# Patient Record
Sex: MALE | Race: WHITE | NOT HISPANIC OR LATINO | Employment: OTHER | URBAN - METROPOLITAN AREA
[De-identification: names, ages, dates, MRNs, and addresses within clinical notes are randomized per-mention and may not be internally consistent; named-entity substitution may affect disease eponyms.]

---

## 2017-07-27 ENCOUNTER — ALLSCRIPTS OFFICE VISIT (OUTPATIENT)
Dept: OTHER | Facility: OTHER | Age: 72
End: 2017-07-27

## 2017-07-27 LAB
BILIRUB UR QL STRIP: NEGATIVE
CLARITY UR: NORMAL
COLOR UR: YELLOW
GLUCOSE (HISTORICAL): NEGATIVE
HGB UR QL STRIP.AUTO: NEGATIVE
KETONES UR STRIP-MCNC: NEGATIVE MG/DL
LEUKOCYTE ESTERASE UR QL STRIP: NEGATIVE
NITRITE UR QL STRIP: NEGATIVE
PH UR STRIP.AUTO: 5.5 [PH]
PROT UR STRIP-MCNC: NEGATIVE MG/DL
SP GR UR STRIP.AUTO: 1.02
UROBILINOGEN UR QL STRIP.AUTO: 0.2

## 2017-09-11 ENCOUNTER — APPOINTMENT (OUTPATIENT)
Dept: PHYSICAL THERAPY | Facility: REHABILITATION | Age: 72
End: 2017-09-11
Payer: MEDICARE

## 2017-09-11 PROCEDURE — G8991 OTHER PT/OT GOAL STATUS: HCPCS

## 2017-09-11 PROCEDURE — G8990 OTHER PT/OT CURRENT STATUS: HCPCS

## 2017-09-11 PROCEDURE — 97162 PT EVAL MOD COMPLEX 30 MIN: CPT

## 2017-09-13 ENCOUNTER — APPOINTMENT (OUTPATIENT)
Dept: PHYSICAL THERAPY | Facility: REHABILITATION | Age: 72
End: 2017-09-13
Payer: MEDICARE

## 2017-09-13 PROCEDURE — 97110 THERAPEUTIC EXERCISES: CPT

## 2017-09-18 ENCOUNTER — APPOINTMENT (OUTPATIENT)
Dept: PHYSICAL THERAPY | Facility: REHABILITATION | Age: 72
End: 2017-09-18
Payer: MEDICARE

## 2017-09-20 ENCOUNTER — APPOINTMENT (OUTPATIENT)
Dept: PHYSICAL THERAPY | Facility: REHABILITATION | Age: 72
End: 2017-09-20
Payer: MEDICARE

## 2017-09-21 ENCOUNTER — HOSPITAL ENCOUNTER (EMERGENCY)
Facility: HOSPITAL | Age: 72
Discharge: HOME | End: 2017-09-21
Payer: MEDICARE

## 2017-09-21 ENCOUNTER — GENERIC CONVERSION - ENCOUNTER (OUTPATIENT)
Dept: OTHER | Facility: OTHER | Age: 72
End: 2017-09-21

## 2017-09-21 ENCOUNTER — APPOINTMENT (EMERGENCY)
Dept: CT IMAGING | Facility: HOSPITAL | Age: 72
End: 2017-09-21
Payer: MEDICARE

## 2017-09-21 VITALS
SYSTOLIC BLOOD PRESSURE: 125 MMHG | HEART RATE: 80 BPM | WEIGHT: 232 LBS | TEMPERATURE: 97.3 F | OXYGEN SATURATION: 97 % | DIASTOLIC BLOOD PRESSURE: 85 MMHG | RESPIRATION RATE: 18 BRPM

## 2017-09-21 DIAGNOSIS — M54.50 LOW BACK PAIN: Primary | ICD-10-CM

## 2017-09-21 PROCEDURE — 99283 EMERGENCY DEPT VISIT LOW MDM: CPT

## 2017-09-21 PROCEDURE — 74176 CT ABD & PELVIS W/O CONTRAST: CPT

## 2017-09-21 RX ORDER — GLIMEPIRIDE 2 MG/1
4 TABLET ORAL
COMMUNITY

## 2017-09-21 RX ORDER — LIDOCAINE 50 MG/G
1 PATCH TOPICAL DAILY
Qty: 30 PATCH | Refills: 0 | Status: SHIPPED | OUTPATIENT
Start: 2017-09-21 | End: 2018-08-28

## 2017-09-21 RX ORDER — ZOLPIDEM TARTRATE 10 MG/1
TABLET ORAL
COMMUNITY

## 2017-09-21 RX ORDER — ESCITALOPRAM OXALATE 20 MG/1
5 TABLET ORAL DAILY
COMMUNITY
End: 2018-08-28

## 2017-09-21 RX ORDER — CLONAZEPAM 0.5 MG/1
0.5 TABLET ORAL 4 TIMES DAILY
COMMUNITY

## 2017-09-21 RX ORDER — BICALUTAMIDE 50 MG/1
TABLET, FILM COATED ORAL DAILY
COMMUNITY
End: 2018-01-29 | Stop reason: ALTCHOICE

## 2017-09-21 RX ORDER — AZELASTINE 1 MG/ML
2 SPRAY, METERED NASAL 3 TIMES DAILY
COMMUNITY

## 2017-09-25 ENCOUNTER — APPOINTMENT (OUTPATIENT)
Dept: PHYSICAL THERAPY | Facility: REHABILITATION | Age: 72
End: 2017-09-25
Payer: MEDICARE

## 2017-09-25 PROCEDURE — 97140 MANUAL THERAPY 1/> REGIONS: CPT

## 2017-09-25 PROCEDURE — 97110 THERAPEUTIC EXERCISES: CPT

## 2017-09-27 ENCOUNTER — APPOINTMENT (OUTPATIENT)
Dept: PHYSICAL THERAPY | Facility: REHABILITATION | Age: 72
End: 2017-09-27
Payer: MEDICARE

## 2017-09-27 PROCEDURE — 97110 THERAPEUTIC EXERCISES: CPT

## 2017-09-27 PROCEDURE — 97140 MANUAL THERAPY 1/> REGIONS: CPT

## 2017-10-02 ENCOUNTER — APPOINTMENT (OUTPATIENT)
Dept: PHYSICAL THERAPY | Facility: REHABILITATION | Age: 72
End: 2017-10-02
Payer: MEDICARE

## 2017-10-02 PROCEDURE — 97140 MANUAL THERAPY 1/> REGIONS: CPT

## 2017-10-02 PROCEDURE — 97110 THERAPEUTIC EXERCISES: CPT

## 2017-10-04 ENCOUNTER — APPOINTMENT (OUTPATIENT)
Dept: PHYSICAL THERAPY | Facility: REHABILITATION | Age: 72
End: 2017-10-04
Payer: MEDICARE

## 2017-10-04 PROCEDURE — 97140 MANUAL THERAPY 1/> REGIONS: CPT

## 2017-10-04 PROCEDURE — 97110 THERAPEUTIC EXERCISES: CPT

## 2017-10-09 ENCOUNTER — APPOINTMENT (OUTPATIENT)
Dept: PHYSICAL THERAPY | Facility: REHABILITATION | Age: 72
End: 2017-10-09
Payer: MEDICARE

## 2017-10-11 ENCOUNTER — APPOINTMENT (OUTPATIENT)
Dept: PHYSICAL THERAPY | Facility: REHABILITATION | Age: 72
End: 2017-10-11
Payer: MEDICARE

## 2017-10-16 ENCOUNTER — APPOINTMENT (OUTPATIENT)
Dept: PHYSICAL THERAPY | Facility: REHABILITATION | Age: 72
End: 2017-10-16
Payer: MEDICARE

## 2017-10-18 ENCOUNTER — APPOINTMENT (OUTPATIENT)
Dept: PHYSICAL THERAPY | Facility: REHABILITATION | Age: 72
End: 2017-10-18
Payer: MEDICARE

## 2017-10-23 ENCOUNTER — APPOINTMENT (OUTPATIENT)
Dept: PHYSICAL THERAPY | Facility: REHABILITATION | Age: 72
End: 2017-10-23
Payer: MEDICARE

## 2017-10-24 ENCOUNTER — ALLSCRIPTS OFFICE VISIT (OUTPATIENT)
Dept: OTHER | Facility: OTHER | Age: 72
End: 2017-10-24

## 2017-10-24 DIAGNOSIS — C61 MALIGNANT NEOPLASM OF PROSTATE (HCC): ICD-10-CM

## 2017-10-24 LAB
BILIRUB UR QL STRIP: NEGATIVE
CLARITY UR: NORMAL
COLOR UR: YELLOW
GLUCOSE (HISTORICAL): NORMAL
HGB UR QL STRIP.AUTO: NORMAL
KETONES UR STRIP-MCNC: NEGATIVE MG/DL
LEUKOCYTE ESTERASE UR QL STRIP: NEGATIVE
NITRITE UR QL STRIP: NEGATIVE
PH UR STRIP.AUTO: 5.5 [PH]
PROT UR STRIP-MCNC: NEGATIVE MG/DL
SP GR UR STRIP.AUTO: 1.02
UROBILINOGEN UR QL STRIP.AUTO: 0.2

## 2017-10-25 ENCOUNTER — APPOINTMENT (OUTPATIENT)
Dept: PHYSICAL THERAPY | Facility: REHABILITATION | Age: 72
End: 2017-10-25
Payer: MEDICARE

## 2017-10-30 ENCOUNTER — GENERIC CONVERSION - ENCOUNTER (OUTPATIENT)
Dept: OTHER | Facility: OTHER | Age: 72
End: 2017-10-30

## 2017-10-30 ENCOUNTER — APPOINTMENT (OUTPATIENT)
Dept: PHYSICAL THERAPY | Facility: REHABILITATION | Age: 72
End: 2017-10-30
Payer: MEDICARE

## 2017-11-27 ENCOUNTER — GENERIC CONVERSION - ENCOUNTER (OUTPATIENT)
Dept: OTHER | Facility: OTHER | Age: 72
End: 2017-11-27

## 2018-01-08 DIAGNOSIS — R35.1 NOCTURIA: ICD-10-CM

## 2018-01-09 NOTE — MISCELLANEOUS
Message  PT  WAS COMPLAINING OF FOOT PAIN, BACK PAIN, SHOULDER PAIN  HE TOOK PAIN MEDICATION AND IT DIDN'T SEEM TO HELP  I RECOMMENDED HIM TO GO TO THE ED, AND FU AFTER  Active Problems    1  Acute prostatitis (601 0) (N41 0)   2  Adenocarcinoma of prostate (185) (C61)   3  Hematuria, gross (599 71) (R31 0)    Current Meds   1  Advil 200 MG Oral Capsule; Therapy: (Recorded:65Kjo5231) to Recorded   2  Allopurinol 100 MG Oral Tablet; Therapy: (Recorded:46Sjg0757) to Recorded   3  Amaryl 2 MG Oral Tablet (Glimepiride); Therapy: (Recorded:61Thr4299) to Recorded   4  Ambien 10 MG Oral Tablet (Zolpidem Tartrate); Therapy: (Recorded:71Tka5633) to Recorded   5  Amitriptyline HCl - 10 MG Oral Tablet; Therapy: (KSGBFJI:31JOY5931) to Recorded   6  Astelin 137 MCG/SPRAY SOLN (Azelastine HCl); Therapy: (Recorded:21Ynm3427) to Recorded   7  Casodex 50 MG Oral Tablet (Bicalutamide); Therapy: (Recorded:67Wzz1315) to Recorded   8  Doxycycline Hyclate 100 MG Oral Capsule; Take 1 capsule twice daily; Therapy: 25ETK4322 to (Yesenia Nevarez)  Requested for: 03YGA5872; Last   Rx:27Oct2017; Status: [address] berenice Good Verification Ordered   9  Escitalopram Oxalate 20 MG Oral Tablet; Therapy: (Recorded:66Sms4154) to Recorded   10  KlonoPIN 0 5 MG Oral Tablet (ClonazePAM); Therapy: (Recorded:97Tuv0853) to Recorded   11  MetFORMIN HCl - 500 MG Oral Tablet; Therapy: (Recorded:00Klw6231) to Recorded   12  Mucinex 600 MG Oral Tablet Extended Release 12 Hour; Therapy: (Recorded:33Zpw4911) to Recorded   13  Norco 5-325 MG Oral Tablet (Hydrocodone-Acetaminophen); Therapy: (Recorded:25Oxk5050) to Recorded   14  Omeprazole 20 MG Oral Capsule Delayed Release; Therapy: (Recorded:82Wll5768) to Recorded   15  Simvastatin 20 MG Oral Tablet; Therapy: (Recorded:62Kcm4234) to Recorded   16  TraMADol HCl - 50 MG Oral Tablet;     Therapy: (Recorded:05Csd5749) to Recorded   17  Valium 10 MG Oral Tablet (DiazePAM); Therapy: (Recorded:50Ztc8702) to Recorded   18  Vicodin 5-300 MG Oral Tablet; Therapy: (Recorded:09Anp0885) to Recorded   19  Victoza 18 MG/3ML Subcutaneous Solution Pen-injector; Therapy: (Recorded:68Hkj9072) to Recorded   20  Vitamin D-3 1000 UNIT Oral Capsule; Therapy: (Recorded:24Mtt8783) to Recorded   21  Voltaren 1 % Transdermal Gel (Diclofenac Sodium); Therapy: (Recorded:27Kai7035) to Recorded   22  Zocor 20 MG Oral Tablet (Simvastatin); Therapy: (Recorded:30Qsj9223) to Recorded    Allergies    1  Ceftin   2  Cephalosporins   3  Cipro   4  Fexofenadine HCl TABS   5  Levaquin TABS   6   Sulfa Drugs    Signatures   Electronically signed by : Christopher Swenson, ; Nov 27 2017 12:54PM EST                       (Author)

## 2018-01-11 NOTE — MISCELLANEOUS
Message   Recorded as Task   Date: 10/23/2017 02:50 PM, Created By: Loni Kay   Task Name: Medical Complaint Callback   Assigned To: Agustín Graves,TEAM   Regarding Patient: Gigi Leventhal, Status: In Progress   Comment:    Brielle Medrano - 23 Oct 2017 2:50 PM     TASK CREATED  Caller: Self; Medical Complaint; [phone] (Home); [phone] (Work)  PATIENT LMOM SAYING HE HAS FREQUENCY AND HAS DISCOMFORT PLEASE CALL HIM Faith Shetty - 23 Oct 2017 2:55 PM     TASK EDITED  [facility] for Pt to call back  Hanh Aragon - 23 Oct 2017 2:56 PM     TASK IN PROGRESS   Hanh Aragon - 23 Oct 2017 4:19 PM     TASK EDITED  Spoke to Pt who is having frequency and would like an appt ASAP  Scheduled an appt with Dr Lisa Foley 10/24  Active Problems    1  Adenocarcinoma of prostate (185) (C61)   2  Hematuria, gross (599 71) (R31 0)    Current Meds   1  Advil 200 MG Oral Capsule; Therapy: (Recorded:52Lro1871) to Recorded   2  Allopurinol 100 MG Oral Tablet; Therapy: (Recorded:39Zlr4702) to Recorded   3  Amaryl 2 MG Oral Tablet (Glimepiride); Therapy: (Recorded:46Cwt6063) to Recorded   4  Ambien 10 MG Oral Tablet (Zolpidem Tartrate); Therapy: (Recorded:88Jhv8747) to Recorded   5  Amitriptyline HCl - 10 MG Oral Tablet; Therapy: (Recorded:21Zut2764) to Recorded   6  Astelin 137 MCG/SPRAY SOLN (Azelastine HCl); Therapy: (Recorded:83Ioe9909) to Recorded   7  Casodex 50 MG Oral Tablet (Bicalutamide); Therapy: (Recorded:39Qfi0116) to Recorded   8  Escitalopram Oxalate 20 MG Oral Tablet; Therapy: (Recorded:12Pdp4928) to Recorded   9  KlonoPIN 0 5 MG Oral Tablet (ClonazePAM); Therapy: (Recorded:53Mvx6499) to Recorded   10  MetFORMIN HCl - 500 MG Oral Tablet; Therapy: (Recorded:50Grj5887) to Recorded   11  Mucinex 600 MG Oral Tablet Extended Release 12 Hour; Therapy: (Recorded:63Xxs1931) to Recorded   12  Norco 5-325 MG Oral Tablet (Hydrocodone-Acetaminophen);     Therapy: (Recorded:91Sae6619) to Recorded   13  Omeprazole 20 MG Oral Capsule Delayed Release; Therapy: (Recorded:48Bco5848) to Recorded   14  Simvastatin 20 MG Oral Tablet; Therapy: (Recorded:68Ici6044) to Recorded   15  TraMADol HCl - 50 MG Oral Tablet; Therapy: (Recorded:62Thy4389) to Recorded   16  Valium 10 MG Oral Tablet (DiazePAM); Therapy: (Recorded:01Wgu9130) to Recorded   17  Vicodin 5-300 MG Oral Tablet; Therapy: (Recorded:67Tzl8867) to Recorded   18  Victoza 18 MG/3ML Subcutaneous Solution Pen-injector; Therapy: (Recorded:12Qjj2366) to Recorded   19  Vitamin D-3 1000 UNIT Oral Capsule; Therapy: (Recorded:63Ebi8722) to Recorded   20  Voltaren 1 % Transdermal Gel (Diclofenac Sodium); Therapy: (Recorded:02Veb2183) to Recorded   21  Zocor 20 MG Oral Tablet (Simvastatin); Therapy: (Recorded:09Baa2924) to Recorded    Allergies    1  Ceftin   2  Cipro   3  Fexofenadine HCl TABS   4  Levaquin TABS   5   Sulfa Drugs    Signatures   Electronically signed by : Grady Chawla, ; Oct 23 2017  4:19PM EST                       (Author)

## 2018-01-12 VITALS — SYSTOLIC BLOOD PRESSURE: 142 MMHG | HEIGHT: 68 IN | TEMPERATURE: 96.8 F | DIASTOLIC BLOOD PRESSURE: 76 MMHG

## 2018-01-12 NOTE — MISCELLANEOUS
Message   Recorded as Task   Date: 10/23/2017 02:50 PM, Created By: Lazarus Lundy   Task Name: Medical Complaint Callback   Assigned To: Agustín Graves,TEAM   Regarding Patient: Vanessa Encarnacion, Status: Active   Comment:    Brielle Medrano - 23 Oct 2017 2:50 PM     TASK CREATED  Caller: Self; Medical Complaint; [phone] (Home); [phone] (Work)  PATIENT LMOM SAYING HE HAS FREQUENCY AND HAS DISCOMFORT PLEASE CALL HIM Radhika Nichols - 23 Oct 2017 2:55 PM     TASK EDITED  [facility] for Pt to call back  Active Problems    1  Adenocarcinoma of prostate (185) (C61)   2  Hematuria, gross (599 71) (R31 0)    Current Meds   1  Advil 200 MG Oral Capsule; Therapy: (Recorded:22Xos3091) to Recorded   2  Allopurinol 100 MG Oral Tablet; Therapy: (Recorded:72Sjy5220) to Recorded   3  Amaryl 2 MG Oral Tablet (Glimepiride); Therapy: (Recorded:13Ucq4756) to Recorded   4  Ambien 10 MG Oral Tablet (Zolpidem Tartrate); Therapy: (Recorded:07Sxt8605) to Recorded   5  Amitriptyline HCl - 10 MG Oral Tablet; Therapy: (Recorded:86Evh8433) to Recorded   6  Astelin 137 MCG/SPRAY SOLN (Azelastine HCl); Therapy: (Recorded:37Pol2304) to Recorded   7  Casodex 50 MG Oral Tablet (Bicalutamide); Therapy: (Recorded:63Iqs3376) to Recorded   8  Escitalopram Oxalate 20 MG Oral Tablet; Therapy: (Recorded:09Eur3963) to Recorded   9  KlonoPIN 0 5 MG Oral Tablet (ClonazePAM); Therapy: (Recorded:21Iay5665) to Recorded   10  MetFORMIN HCl - 500 MG Oral Tablet; Therapy: (Recorded:96Ldg8495) to Recorded   11  Mucinex 600 MG Oral Tablet Extended Release 12 Hour; Therapy: (Recorded:67Wev7597) to Recorded   12  Norco 5-325 MG Oral Tablet (Hydrocodone-Acetaminophen); Therapy: (Recorded:03Eok5007) to Recorded   13  Omeprazole 20 MG Oral Capsule Delayed Release; Therapy: (Recorded:66Vwg1400) to Recorded   14  Simvastatin 20 MG Oral Tablet; Therapy: (Recorded:17Ujl1120) to Recorded   15   TraMADol HCl - 50 MG Oral Tablet; Therapy: (Recorded:91Nve8134) to Recorded   16  Valium 10 MG Oral Tablet (DiazePAM); Therapy: (Recorded:88Zee3939) to Recorded   17  Vicodin 5-300 MG Oral Tablet; Therapy: (Recorded:17Pwl3121) to Recorded   18  Victoza 18 MG/3ML Subcutaneous Solution Pen-injector; Therapy: (Recorded:93Upx2147) to Recorded   19  Vitamin D-3 1000 UNIT Oral Capsule; Therapy: (Recorded:61Qxt1473) to Recorded   20  Voltaren 1 % Transdermal Gel (Diclofenac Sodium); Therapy: (Recorded:00Hae8974) to Recorded   21  Zocor 20 MG Oral Tablet (Simvastatin); Therapy: (Recorded:04Sqa6845) to Recorded    Allergies    1  Ceftin   2  Cipro   3  Fexofenadine HCl TABS   4  Levaquin TABS   5   Sulfa Drugs    Signatures   Electronically signed by : Dustin Sanderson, ; Oct 23 2017  2:56PM EST                       (Author)

## 2018-01-13 VITALS
HEIGHT: 68 IN | DIASTOLIC BLOOD PRESSURE: 80 MMHG | WEIGHT: 233 LBS | SYSTOLIC BLOOD PRESSURE: 134 MMHG | BODY MASS INDEX: 35.31 KG/M2

## 2018-01-24 DIAGNOSIS — C61 MALIGNANT NEOPLASM OF PROSTATE (HCC): ICD-10-CM

## 2018-01-27 DIAGNOSIS — C61 MALIGNANT NEOPLASM OF PROSTATE (HCC): ICD-10-CM

## 2018-01-29 ENCOUNTER — OFFICE VISIT (OUTPATIENT)
Dept: UROLOGY | Facility: MEDICAL CENTER | Age: 73
End: 2018-01-29
Payer: MEDICARE

## 2018-01-29 VITALS
HEIGHT: 68 IN | BODY MASS INDEX: 36.98 KG/M2 | WEIGHT: 244 LBS | DIASTOLIC BLOOD PRESSURE: 80 MMHG | SYSTOLIC BLOOD PRESSURE: 140 MMHG

## 2018-01-29 DIAGNOSIS — C61 PROSTATE CANCER (HCC): Primary | ICD-10-CM

## 2018-01-29 DIAGNOSIS — R35.0 INCREASED URINARY FREQUENCY: ICD-10-CM

## 2018-01-29 LAB
SL AMB  POCT GLUCOSE, UA: NEGATIVE
SL AMB LEUKOCYTE ESTERASE,UA: NEGATIVE
SL AMB POCT BILIRUBIN,UA: NEGATIVE
SL AMB POCT BLOOD,UA: NEGATIVE
SL AMB POCT CLARITY,UA: CLEAR
SL AMB POCT COLOR,UA: YELLOW
SL AMB POCT KETONES,UA: NEGATIVE
SL AMB POCT NITRITE,UA: NEGATIVE
SL AMB POCT PH,UA: 6.5
SL AMB POCT SPECIFIC GRAVITY,UA: 1.01

## 2018-01-29 PROCEDURE — 99214 OFFICE O/P EST MOD 30 MIN: CPT | Performed by: UROLOGY

## 2018-01-29 PROCEDURE — 81003 URINALYSIS AUTO W/O SCOPE: CPT | Performed by: UROLOGY

## 2018-01-29 RX ORDER — AZITHROMYCIN 250 MG/1
TABLET, FILM COATED ORAL
COMMUNITY
Start: 2017-11-30 | End: 2018-05-30 | Stop reason: ALTCHOICE

## 2018-01-29 RX ORDER — CELECOXIB 200 MG/1
CAPSULE ORAL
COMMUNITY
Start: 2018-01-22 | End: 2018-10-31

## 2018-01-29 RX ORDER — BENZONATATE 100 MG/1
100 CAPSULE ORAL 3 TIMES DAILY
COMMUNITY
Start: 2018-01-22 | End: 2018-02-01

## 2018-01-29 RX ORDER — ALLOPURINOL 100 MG/1
TABLET ORAL
COMMUNITY
Start: 2018-01-09 | End: 2018-07-20 | Stop reason: SDUPTHER

## 2018-01-29 RX ORDER — CHLORHEXIDINE GLUCONATE 0.12 MG/ML
RINSE ORAL
COMMUNITY
Start: 2017-11-08 | End: 2018-08-28

## 2018-01-29 RX ORDER — AMITRIPTYLINE HYDROCHLORIDE 10 MG/1
TABLET, FILM COATED ORAL
COMMUNITY
End: 2018-10-31

## 2018-01-29 RX ORDER — OXYBUTYNIN CHLORIDE 5 MG/1
5 TABLET, EXTENDED RELEASE ORAL DAILY
Qty: 30 TABLET | Refills: 11 | Status: SHIPPED | OUTPATIENT
Start: 2018-01-29 | End: 2018-07-20 | Stop reason: SDUPTHER

## 2018-01-29 RX ORDER — DOXYCYCLINE HYCLATE 100 MG
100 TABLET ORAL
COMMUNITY
Start: 2018-01-22 | End: 2018-02-01

## 2018-01-29 NOTE — PATIENT INSTRUCTIONS
the oxybutynin prescription from your pharmacy  Stop taking the medication if you notice you of difficulty urinating

## 2018-01-29 NOTE — LETTER
2018     [address], [address] [id number] [facility],[address] [facility]    Patient: Sekou Stage   YOB: 1945   Date of Visit: 2018       Dear Dr Rob Markham:    Thank you for referring Sekou Stage to me for evaluation  Below are my notes for this consultation  If you have questions, please do not hesitate to call me  I look forward to following your patient along with you  Sincerely,        Jeanne Goldstein MD        CC: No Recipients  Jeanne Goldstein MD  2018  1:56 PM  Sign at close encounter  [address] [facility]  [address], [address] [place] [address]  [phone]  www  [facility]  org      NAME: Sekou Stage  AGE: 67 y o  SEX: male  : 1945   MRN: [id number]    DATE: 2018  TIME: 1:42 PM    Assessment and Plan   Who from a prostate cancer perspective he is doing very well in a good remission with Lupron last dose 2017  We will recheck his PSA in 6 months and base the decision to re-dose him at that time  With his persistent urgency and frequency, we will start him on oxybutynin extended release 5 mg daily  Chief Complaint     Chief Complaint   Patient presents with    Prostate Cancer    Abdominal Pain    Urinary frequency    History of Present Illness   Last office visit, He had increased tremors, increased headaches and bilateral hip pain  I thought he may He may have had a prostatitis causing the urgency frequency and getting up a lot at night, so we tried  Keflex 500 mg 4 times a day for 7 days-this did help  Currently is suffering from an upper respiratory infection and diarrhea     He was started on Flomax last visit  For the urgency and frequency but he may have had a bad reaction to this  He is no longer on it   has CA bilaterally, 10/12 cores , durga 6 with 2 cores of durga 7  PSA 11  Negative chest/abd/pelvis CT 3/2017   Bone scan negative    Had prolonged hematuria and strangury after bx  Has chronic fatigue, partly due to insomnia and anxiety  We have discussed tx in past- felt ADT is best for now, given his mental/physical state- I do not think he would do well with any aggressive tx  Had Lupron 5/18/2017  PSA down to 0,27 from 11, PSA remains low at 0 82  [facility] Cruel Urinalysis shows no sign of infection  No fevers  Because his PSA is so low, he does not need Lupron at this time but we will allow it to drift up  The following portions of the patient's history were reviewed and updated as appropriate: allergies, current medications, past family history, past medical history, past social history, past surgical history and problem list     Review of Systems   Review of Systems   Constitutional: Positive for chills  Gastrointestinal: Positive for diarrhea  Genitourinary: Positive for frequency and urgency  Active Problem List   There is no problem list on file for this patient  Objective   /80 (BP Location: Left arm, Patient Position: Sitting)   Ht 5' 8" (1 727 m)   Wt 111 kg (244 lb)   BMI 37 10 kg/m²      Physical Exam   Constitutional: He is oriented to person, place, and time  He appears well-developed and well-nourished  HENT:   Head: Normocephalic and atraumatic  Eyes: EOM are normal    Neck: Normal range of motion  Pulmonary/Chest: Effort normal    Musculoskeletal: Normal range of motion  Neurological: He is alert and oriented to person, place, and time  Skin: Skin is warm and dry  Psychiatric: He has a normal mood and affect   His behavior is normal  Judgment and thought content normal        Pertinent Laboratory/Diagnostic Studies:  CBC: No results found for: WBC, RBC, HGB, HCT, MCV, MCH, MCHC, RDW, MPV, PLT, NRBC, NEUTOPHILPCT, LYMPHOPCT, MONOPCT, EOSPCT, BASOPCT, NEUTROABS, LYMPHSABS, MONOSABS, EOSABS, MONOSABS  Chemistry Profile:   Lab Results   Component Value Date/Time    SLAMBGLUCOSE negative 01/29/2018 01:29 PM       Current Medications     Current Outpatient Prescriptions:     benzonatate (TESSALON PERLES) 100 mg capsule, Take 100 mg by mouth Three times a day, Disp: , Rfl:     doxycycline hyclate (VIBRA-TABS) 100 mg tablet, Take 100 mg by mouth, Disp: , Rfl:     azelastine (ASTELIN) 0 1 % nasal spray, 1 spray into each nostril 3 (three) times a day Use in each nostril as directed, Disp: , Rfl:     bicalutamide (CASODEX) 50 mg tablet, Take by mouth daily, Disp: , Rfl:     clonazePAM (KlonoPIN) 0 5 mg tablet, Take 0 5 mg by mouth 3 (three) times a day, Disp: , Rfl:     escitalopram (LEXAPRO) 20 mg tablet, Take 5 mg by mouth daily, Disp: , Rfl:     Fexofenadine HCl (MUCINEX ALLERGY PO), Take by mouth as needed, Disp: , Rfl:     glimepiride (AMARYL) 2 mg tablet, Take 2 mg by mouth every morning before breakfast, Disp: , Rfl:     Hydrocodone-Acetaminophen (VICODIN PO), Take by mouth daily, Disp: , Rfl:     Ibuprofen (MOTRIN PO), Take by mouth as needed, Disp: , Rfl:     lidocaine (LIDODERM) 5 %, Place 1 patch on the skin daily Remove & Discard patch within 12 hours or as directed by MD, Disp: 30 patch, Rfl: 0    Liraglutide (VICTOZA SC), Inject under the skin daily, Disp: , Rfl:     metFORMIN (GLUCOPHAGE) 500 mg tablet, Take 500 mg by mouth 2 (two) times a day with meals, Disp: , Rfl:     SIMVASTATIN PO, Take by mouth daily, Disp: , Rfl:     TRAMADOL HCL PO, Take by mouth as needed, Disp: , Rfl:     zolpidem (AMBIEN) 10 mg tablet, Take by mouth daily at bedtime, Disp: , Rfl:         Madalyn Gilman MD

## 2018-01-29 NOTE — PROGRESS NOTES
[address] [facility]  [facility]  [address] [facility]  [place], [address] [facility]  [phone]  www  [facility]  org      NAME: Michelle Mares  AGE: 67 y o  SEX: male  : 1945   MRN: [id number]    DATE: 2018  TIME: 1:42 PM    Assessment and Plan   Who from a prostate cancer perspective he is doing very well in a good remission with Lupron last dose 2017  We will recheck his PSA in 6 months and base the decision to re-dose him at that time  With his persistent urgency and frequency, we will start him on oxybutynin extended release 5 mg daily  Chief Complaint     Chief Complaint   Patient presents with    Prostate Cancer    Abdominal Pain    Urinary frequency    History of Present Illness   Last office visit, He had increased tremors, increased headaches and bilateral hip pain  I thought he may He may have had a prostatitis causing the urgency frequency and getting up a lot at night, so we tried  Keflex 500 mg 4 times a day for 7 days-this did help  Currently is suffering from an upper respiratory infection and diarrhea     He was started on Flomax last visit  For the urgency and frequency but he may have had a bad reaction to this  He is no longer on it   has CA bilaterally, 10/12 cores , durga 6 with 2 cores of durga 7  PSA 11  Negative chest/abd/pelvis CT 3/2017  Bone scan negative    Had prolonged hematuria and strangury after bx  Has chronic fatigue, partly due to insomnia and anxiety  We have discussed tx in past- felt ADT is best for now, given his mental/physical state- I do not think he would do well with any aggressive tx  Had Lupron 2017  PSA down to 0,27 from 11, PSA remains low at 0 82  [facility] Urinalysis shows no sign of infection  No fevers  Because his PSA is so low, he does not need Lupron at this time but we will allow it to drift up      The following portions of the patient's history were reviewed and updated as appropriate: allergies, current medications, past family history, past medical history, past social history, past surgical history and problem list     Review of Systems   Review of Systems   Constitutional: Positive for chills  Gastrointestinal: Positive for diarrhea  Genitourinary: Positive for frequency and urgency  Active Problem List   There is no problem list on file for this patient  Objective   /80 (BP Location: Left arm, Patient Position: Sitting)   Ht 5' 8" (1 727 m)   Wt 111 kg (244 lb)   BMI 37 10 kg/m²     Physical Exam   Constitutional: He is oriented to person, place, and time  He appears well-developed and well-nourished  HENT:   Head: Normocephalic and atraumatic  Eyes: EOM are normal    Neck: Normal range of motion  Pulmonary/Chest: Effort normal    Musculoskeletal: Normal range of motion  Neurological: He is alert and oriented to person, place, and time  Skin: Skin is warm and dry  Psychiatric: He has a normal mood and affect   His behavior is normal  Judgment and thought content normal        Pertinent Laboratory/Diagnostic Studies:  CBC: No results found for: WBC, RBC, HGB, HCT, MCV, MCH, MCHC, RDW, MPV, PLT, NRBC, NEUTOPHILPCT, LYMPHOPCT, MONOPCT, EOSPCT, BASOPCT, NEUTROABS, LYMPHSABS, MONOSABS, EOSABS, MONOSABS  Chemistry Profile:   Lab Results   Component Value Date/Time    SLAMBGLUCOSE negative 01/29/2018 01:29 PM       Current Medications     Current Outpatient Prescriptions:     benzonatate (TESSALON PERLES) 100 mg capsule, Take 100 mg by mouth Three times a day, Disp: , Rfl:     doxycycline hyclate (VIBRA-TABS) 100 mg tablet, Take 100 mg by mouth, Disp: , Rfl:     azelastine (ASTELIN) 0 1 % nasal spray, 1 spray into each nostril 3 (three) times a day Use in each nostril as directed, Disp: , Rfl:     bicalutamide (CASODEX) 50 mg tablet, Take by mouth daily, Disp: , Rfl:     clonazePAM (KlonoPIN) 0 5 mg tablet, Take 0 5 mg by mouth 3 (three) times a day, Disp: , Rfl:     escitalopram (LEXAPRO) 20 mg tablet, Take 5 mg by mouth daily, Disp: , Rfl:     Fexofenadine HCl (MUCINEX ALLERGY PO), Take by mouth as needed, Disp: , Rfl:     glimepiride (AMARYL) 2 mg tablet, Take 2 mg by mouth every morning before breakfast, Disp: , Rfl:     Hydrocodone-Acetaminophen (VICODIN PO), Take by mouth daily, Disp: , Rfl:     Ibuprofen (MOTRIN PO), Take by mouth as needed, Disp: , Rfl:     lidocaine (LIDODERM) 5 %, Place 1 patch on the skin daily Remove & Discard patch within 12 hours or as directed by MD, Disp: 30 patch, Rfl: 0    Liraglutide (VICTOZA SC), Inject under the skin daily, Disp: , Rfl:     metFORMIN (GLUCOPHAGE) 500 mg tablet, Take 500 mg by mouth 2 (two) times a day with meals, Disp: , Rfl:     SIMVASTATIN PO, Take by mouth daily, Disp: , Rfl:     TRAMADOL HCL PO, Take by mouth as needed, Disp: , Rfl:     zolpidem (AMBIEN) 10 mg tablet, Take by mouth daily at bedtime, Disp: , Rfl:         Viona Peabody, MD

## 2018-03-26 ENCOUNTER — APPOINTMENT (OUTPATIENT)
Dept: PHYSICAL THERAPY | Facility: REHABILITATION | Age: 73
End: 2018-03-26
Payer: MEDICARE

## 2018-04-04 ENCOUNTER — EVALUATION (OUTPATIENT)
Dept: PHYSICAL THERAPY | Facility: REHABILITATION | Age: 73
End: 2018-04-04
Payer: MEDICARE

## 2018-04-04 DIAGNOSIS — M54.42 ACUTE BACK PAIN WITH SCIATICA, LEFT: ICD-10-CM

## 2018-04-04 DIAGNOSIS — R29.898 LEG WEAKNESS, BILATERAL: Primary | ICD-10-CM

## 2018-04-04 DIAGNOSIS — R53.1 WEAKNESS GENERALIZED: ICD-10-CM

## 2018-04-04 PROCEDURE — 97162 PT EVAL MOD COMPLEX 30 MIN: CPT

## 2018-04-04 PROCEDURE — G8979 MOBILITY GOAL STATUS: HCPCS

## 2018-04-04 PROCEDURE — G8978 MOBILITY CURRENT STATUS: HCPCS

## 2018-04-04 NOTE — PROGRESS NOTES
PT Initial Evaluation    2018  Juan M Johnson  : 1945  MRN: [id number]  PLFY:[phone] (home)   Mobile: There is no such number on file (mobile)  Insurance Information: Payor: MEDICARE / Plan: MEDICARE A AND B / Product Type: Medicare A & B Fee for Service /   Referring Provider: Maria C Parker MD    Subjective    HPI: Juan M Johnson is a 67 y o  male referred to outpatient physical therapy for   1  Leg weakness, bilateral    2  Acute back pain with sciatica, left    3  Weakness generalized        He reports he went to [facility] for PT beginning in February and stopping in March  He reports that they worked him really hard and he thinks he pulled a muscle around his L knee which has since almost healed fully  His current complaints consist of B hip pain, low back pain (to the R of the low back), L knee pain in the morning, difficulty getting out of chairs, and difficulty negotiating stairs  Patient has an extensive PMH including prostate cancer, kidney stones, Type II diabetes, hypertension, and sleep apnea  Falls Hx: Denies and recent falls  Hip pain: Current 7-8/10, Best 3/10, Worst 9/10  Patient Goal: Improve his strength and endurance, rack and work in the garden    Objective     ROM:   WFL throughout B LE, no pain in B hips with passive ROM  L HS length 45 degrees  R HS length 50 degrees    (-) Hip Scour test B    Manual Muscle Testing - Hip Left Right   Flexion 3+ 3+   Adduction 3+ 3+   Abduction 3+ 3+   External Rotation 4 4     Manual Muscle Testing - Knee Left Right   Flexion 4 4   Extension 5 5     Manual Muscle Testing - Ankle Left Right   Doriflexion 4 4   Plantarflexion NT NT     Palpation:   Patient with moderate TTP at R lumbar paraspinals at level of L4  Increased muscle tone noted at this point of tenderness      Gait Assessment:  Decreased gait speed compared to age matched norms, decreased toe clearance B L > R, decreased arm swing B, decreased hip extension in terminal stance, wide ZEKE  Outcome Measures    6 Minute Walk Test (ft): NT   2 Minute Walk Test (ft): 360 feet   10MWT 10 91 sec   5x Sit To Stand (s): 15 06 sec    TUG: NT     MCTSIB Sway Index   Feet Together, Eyes Open 1 25   Feet Together, Eyes Closed 1 85   Feet Together, Eyes Open Foam 1 73   Feet Together, Eyes Closed Foam 3 92     Patient provided with initial HEP including:  Sit <> stands from raised surface (chair with pillows)  Seated HS stretch  HL trunk rotation  Ambulation, bouts of 2 min 2-3x per day    Precautions: Hx of cancer, diabetes type 2, hypertension    Specialty Daily Treatment Diary     Manual         Hip LA distraction        STM to lumbar paraspinals                                    Exercise Diary         Sit <> stands        4-way hip        Heel raises        Bridges        Clamshells        Supine SLR        Recumbent bike        Ambulation        Step-ups        Dynamic balance        Static balance        B LE stretching                                                                          Assessment/Plan    Assessment:  Patient presents to outpatient physical therapy with decreased strength, balance, endurance, pain that limits function, and gait deviations including those listed in observation section  These impairments are limiting the patient's ability to stair climb, ambulate for longer periods of time without pain, and perform a sit <> stand without an increase in B hip pain  Patient will benefit from skilled outpatient physical therapy to address the above impairments in order to improve patient the patient's QOL       STG's:     - Patient completes FGA upon next session for improved knowledge of dynamic balance    - Patient improves EC on foam task of MCSTIB for improved static balance within 4 wk    - Patient is independent with initial home exercise program for improvements at home within 4 wk    - Patient ambulates for 6 consecutive min with appropriate vital sign response and less than a 2 point increase in pain for improved endurance within 4 wk   LTG's:   - Patient improves distance ambulated on 2MWT by 10% within 8 wk for improved endurance   - Patient decreased time to complete 5x sit <> stand by 3 seconds for decreased risk of falls within 8 wk   - Patient improves gait speed to within 10% of age matched norms within 8 wk   - Patient improves initial score on FGA by 3 points for improved dynamic balance within 8 wk    Plan:  Patient will benefit from skilled outpatient physical therapy 2x/wk for 8 wk  A list of appropriate exercises can be found in the objective section of this note

## 2018-04-04 NOTE — LETTER
2018    Taty Choi MD  [address] [place] [address]    Patient: Tami Muir   YOB: 1945   Date of Visit: 2018     Encounter Diagnosis     ICD-10-CM    1  Leg weakness, bilateral R29 898    2  Acute back pain with sciatica, left M54 42    3  Weakness generalized R53 1        Dear Dr Cara Ma:    Please review the attached Plan of Care from [address] [place] [address] recent visit  Please verify that you agree therapy should continue by signing the attached document and sending it back to our office  If you have any questions or concerns, please don't hesitate to call  Sincerely,    Cheri Narvaez, PT      Referring Provider:      I certify that I have read the below Plan of Care and certify the need for these services furnished under this plan of treatment while under my care  Taty Choi MD  [address] [place] [address]  [address] [place]: [phone]          PT Initial Evaluation    2018  Tami Muir  : 1945  MRN: [id number]  FKVB:[phone] (home)   Mobile: There is no such number on file (mobile)  Insurance Information: Payor: MEDICARE / Plan: MEDICARE A AND B / Product Type: Medicare A & B Fee for Service /   Referring Provider: Donna Self MD    Subjective    HPI: Tami Muir is a 67 y o  male referred to outpatient physical therapy for   1  Leg weakness, bilateral    2  Acute back pain with sciatica, left    3  Weakness generalized        He reports he went to [facility] for PT beginning in February and stopping in March  He reports that they worked him really hard and he thinks he pulled a muscle around his L knee which has since almost healed fully  His current complaints consist of B hip pain, low back pain (to the R of the low back), L knee pain in the morning, difficulty getting out of chairs, and difficulty negotiating stairs   Patient has an extensive PMH including prostate cancer, kidney stones, Type II diabetes, hypertension, and sleep apnea  Falls Hx: Denies and recent falls  Hip pain: Current 7-8/10, Best 3/10, Worst 9/10  Patient Goal: Improve his strength and endurance, rack and work in the garden    Objective     ROM:   WFL throughout B LE, no pain in B hips with passive ROM  L HS length 45 degrees  R HS length 50 degrees    (-) Hip Scour test B    Manual Muscle Testing - Hip Left Right   Flexion 3+ 3+   Adduction 3+ 3+   Abduction 3+ 3+   External Rotation 4 4     Manual Muscle Testing - Knee Left Right   Flexion 4 4   Extension 5 5     Manual Muscle Testing - Ankle Left Right   Doriflexion 4 4   Plantarflexion NT NT     Palpation:   Patient with moderate TTP at R lumbar paraspinals at level of L4  Increased muscle tone noted at this point of tenderness  Gait Assessment:  Decreased gait speed compared to age matched norms, decreased toe clearance B L > R, decreased arm swing B, decreased hip extension in terminal stance, wide ZEKE       Outcome Measures    6 Minute Walk Test (ft): NT   2 Minute Walk Test (ft): 360 feet   10MWT 10 91 sec   5x Sit To Stand (s): 15 06 sec    TUG: NT     MCTSIB Sway Index   Feet Together, Eyes Open 1 25   Feet Together, Eyes Closed 1 85   Feet Together, Eyes Open Foam 1 73   Feet Together, Eyes Closed Foam 3 92     Patient provided with initial HEP including:  Sit <> stands from raised surface (chair with pillows)  Seated HS stretch  HL trunk rotation  Ambulation, bouts of 2 min 2-3x per day    Precautions: Hx of cancer, diabetes type 2, hypertension    Specialty Daily Treatment Diary     Manual         Hip LA distraction        STM to lumbar paraspinals                                    Exercise Diary         Sit <> stands        4-way hip        Heel raises        [facility]        Supine SLR        Recumbent bike        Ambulation        Step-ups        Dynamic balance        Static balance        B LE stretching                                                                          Assessment/Plan    Assessment:  Patient presents to outpatient physical therapy with decreased strength, balance, endurance, pain that limits function, and gait deviations including those listed in observation section  These impairments are limiting the patient's ability to stair climb, ambulate for longer periods of time without pain, and perform a sit <> stand without an increase in B hip pain  Patient will benefit from skilled outpatient physical therapy to address the above impairments in order to improve patient the patient's QOL  STG's:     - Patient completes FGA upon next session for improved knowledge of dynamic balance    - Patient improves EC on foam task of MCSTIB for improved static balance within 4 wk    - Patient is independent with initial home exercise program for improvements at home within 4 wk    - Patient ambulates for 6 consecutive min with appropriate vital sign response and less than a 2 point increase in pain for improved endurance within 4 wk   LTG's:   - Patient improves distance ambulated on 2MWT by 10% within 8 wk for improved endurance   - Patient decreased time to complete 5x sit <> stand by 3 seconds for decreased risk of falls within 8 wk   - Patient improves gait speed to within 10% of age matched norms within 8 wk   - Patient improves initial score on FGA by 3 points for improved dynamic balance within 8 wk    Plan:  Patient will benefit from skilled outpatient physical therapy 2x/wk for 8 wk  A list of appropriate exercises can be found in the objective section of this note

## 2018-04-11 ENCOUNTER — OFFICE VISIT (OUTPATIENT)
Dept: PHYSICAL THERAPY | Facility: REHABILITATION | Age: 73
End: 2018-04-11
Payer: MEDICARE

## 2018-04-11 DIAGNOSIS — R53.1 WEAKNESS GENERALIZED: ICD-10-CM

## 2018-04-11 DIAGNOSIS — M54.42 ACUTE BACK PAIN WITH SCIATICA, LEFT: ICD-10-CM

## 2018-04-11 DIAGNOSIS — R29.898 LEG WEAKNESS, BILATERAL: Primary | ICD-10-CM

## 2018-04-11 PROCEDURE — 97110 THERAPEUTIC EXERCISES: CPT

## 2018-04-11 PROCEDURE — 97140 MANUAL THERAPY 1/> REGIONS: CPT

## 2018-04-11 NOTE — PROGRESS NOTES
Daily Note     Today's date: 2018  Patient name: Raf Woodall  : 1945  MRN: [id number]  Referring provider: Bronson Aguilar MD  Dx:   Encounter Diagnosis     ICD-10-CM    1  Leg weakness, bilateral R29 898    2  Acute back pain with sciatica, left M54 42    3  Weakness generalized R53 1        Start Time: 1500  Stop Time: 1545  Total time in clinic (min): 45 minutes    Subjective: Patient arrives today reporting he has been performing his HEP  He says his walking program is going well  He reports that he walks 3 min at a time a couple of times a day  He reports his current hip pain is 7/10      Objective: See treatment diary below    Specialty Daily Treatment Diary      Manual   18           Hip LA distraction             STM to lumbar paraspinals  10 min                                                           Exercise Diary   18           Sit <> stands             4-way hip  flex/abd/ext 10x ea           Heel raises              PPT 10x10 sec holds           HL trunk rotation 15x B       Supine hip Add 10 x 5 sec holds           Supine marches 15x B           Trunk flex stretch on physioball 1 min       physioball roll-outs  15x           Ambulation  3 min            Step-ups             Dynamic balance             Static balance             Paraspinal release with firm ball  R lumbar 2 min                                                                                                                                Assessment: Tolerated treatment well  He reported a reduction in hip pain to 3/10 by end of session  Patient was provided with an initial HEP for core/trunk strengthening as well as trunk flexion stretching  Patient demonstrated fatigue post treatment and would benefit from continued PT      Plan: Continue per plan of care  Progress treatment as tolerated

## 2018-04-16 ENCOUNTER — OFFICE VISIT (OUTPATIENT)
Dept: PHYSICAL THERAPY | Facility: REHABILITATION | Age: 73
End: 2018-04-16
Payer: MEDICARE

## 2018-04-16 DIAGNOSIS — R29.898 LEG WEAKNESS, BILATERAL: Primary | ICD-10-CM

## 2018-04-16 PROCEDURE — 97110 THERAPEUTIC EXERCISES: CPT | Performed by: PHYSICAL THERAPIST

## 2018-04-16 NOTE — PROGRESS NOTES
Daily Note     Today's date: 2018  Patient name: Claude Dominguez  : 1945  MRN: [id number]  Referring provider: Michelle Marroquin MD  Dx:   Encounter Diagnosis     ICD-10-CM    1  Leg weakness, bilateral R29 898         Subjective: Patient without a lot of activity over the weekend, was backed up and couldn't keep food down, now feels better  Patient notes he takes a whole bunch of medications for allergies/sinus issues, gets lots of congestion and gets headaches  Objective: See treatment diary below    Specialty Daily Treatment Diary      Manual   18         Hip LA distraction             STM to lumbar paraspinals  10 min                                                          Exercise Diary   18         Sit <> stands    20" surface, 12 x 2 sets           4-way hip  flex/abd/ext 10x ea flexion, 10 each  Abduction, 10 each  Extension, 10 each         Heel raises    15 x 2 sets         PPT 10x10 sec holds  5 sec x 10         HL trunk rotation 15x B 5 sec x 5 each      Supine hip Add 10 x 5 sec holds           Supine marches 15x B  12 B         Trunk flex stretch on physioball 1 min       physioball roll-outs  15x           Ambulation  3 min            Step-ups    4" step, railings, 15 each, 2nd set of 10         Dynamic balance   heel to toe weight shift, 30 sec         Static balance             Paraspinal release with firm ball  R lumbar 2 min  3 min trunk flexion (no paraspinal release)                                                                                                                              Assessment: No increase in hip pain with step up exercises today  Continue to progress as tolerated

## 2018-04-19 ENCOUNTER — APPOINTMENT (OUTPATIENT)
Dept: PHYSICAL THERAPY | Facility: REHABILITATION | Age: 73
End: 2018-04-19
Payer: MEDICARE

## 2018-04-19 ENCOUNTER — OFFICE VISIT (OUTPATIENT)
Dept: UROLOGY | Facility: MEDICAL CENTER | Age: 73
End: 2018-04-19
Payer: MEDICARE

## 2018-04-19 ENCOUNTER — TELEPHONE (OUTPATIENT)
Dept: UROLOGY | Facility: AMBULATORY SURGERY CENTER | Age: 73
End: 2018-04-19

## 2018-04-19 VITALS
BODY MASS INDEX: 36.68 KG/M2 | HEIGHT: 68 IN | SYSTOLIC BLOOD PRESSURE: 130 MMHG | WEIGHT: 242 LBS | DIASTOLIC BLOOD PRESSURE: 80 MMHG

## 2018-04-19 DIAGNOSIS — R35.0 URINARY FREQUENCY: Primary | ICD-10-CM

## 2018-04-19 LAB
SL AMB  POCT GLUCOSE, UA: ABNORMAL
SL AMB LEUKOCYTE ESTERASE,UA: NEGATIVE
SL AMB POCT BILIRUBIN,UA: NEGATIVE
SL AMB POCT BLOOD,UA: NEGATIVE
SL AMB POCT CLARITY,UA: CLEAR
SL AMB POCT COLOR,UA: YELLOW
SL AMB POCT KETONES,UA: NEGATIVE
SL AMB POCT NITRITE,UA: NEGATIVE
SL AMB POCT PH,UA: 7
SL AMB POCT SPECIFIC GRAVITY,UA: 1.01
SL AMB POCT URINE PROTEIN: NEGATIVE
SL AMB POCT UROBILINOGEN: 0.2

## 2018-04-19 PROCEDURE — 81003 URINALYSIS AUTO W/O SCOPE: CPT | Performed by: UROLOGY

## 2018-04-19 PROCEDURE — 99212 OFFICE O/P EST SF 10 MIN: CPT | Performed by: UROLOGY

## 2018-04-19 NOTE — TELEPHONE ENCOUNTER
Patient called to speak to nurse  He is in a lot of pain about 2 inches above his penis  He is able to urinate but it burns and hurts  Please call [phone]

## 2018-04-19 NOTE — LETTER
2018     Court Bhatt, [place] [address] [facility]    Patient: Valeria Wadsworth   YOB: 1945   Date of Visit: 2018       Dear Dr Bri Jo:    Thank you for referring Valeria Wadsworth to me for evaluation  Below are my notes for this consultation  If you have questions, please do not hesitate to call me  I look forward to following your patient along with you  Sincerely,        Chino Enriquez MD        CC: No Recipients  Chino Enriquez MD  2018  4:29 PM  Sign at close encounter  [address] [facility]  [address], [address] [place] [address]  [phone]  www  [facility]  org      NAME: Valeria Wadsworth  AGE: 67 y o  SEX: male  : 1945   MRN: [id number]    DATE: 2018  TIME: 4:20 PM    Assessment and Plan: SP pain/urgency- probably passing small stone, feels better now  No need for further treatment /investigation  CAP- PSA under good control, f/uas scheduled with PSAin July  Chief Complaint   No chief complaint on file  History of Present Illness   CAP- on ADT window- PSA 0 82 2018  Woke up with sharp pain in SP region this AM- went to [place] and "pissed like a bandit"- called and was worked in for appt  U/A negative except for small glucose  2017 CT showed tiny bilateral renal calculi- may be passing a stone  Has passed at least 11 stones in the past       The following portions of the patient's history were reviewed and updated as appropriate: allergies, current medications, past family history, past medical history, past social history, past surgical history and problem list     Review of Systems   Review of Systems    Active Problem List   There is no problem list on file for this patient        Objective   /80   Ht 5' 8" (1 727 m)   Wt 110 kg (242 lb)   BMI 36 80 kg/m²      Physical Exam        Current Medications     Current Outpatient Prescriptions:     allopurinol (ZYLOPRIM) 100 mg tablet, , Disp: , Rfl:     amitriptyline (ELAVIL) 10 mg tablet, Take by mouth, Disp: , Rfl:     aspirin 81 MG tablet, Take 81 mg by mouth, Disp: , Rfl:     azelastine (ASTELIN) 0 1 % nasal spray, 2 sprays into each nostril 3 (three) times a day Use in each nostril as directed  , Disp: , Rfl:     azithromycin (ZITHROMAX) 250 mg tablet, , Disp: , Rfl:     celecoxib (CeleBREX) 200 mg capsule, , Disp: , Rfl:     chlorhexidine (PERIDEX) 0 12 % solution, , Disp: , Rfl:     clonazePAM (KlonoPIN) 0 5 mg tablet, Take 0 5 mg by mouth 3 (three) times a day, Disp: , Rfl:     escitalopram (LEXAPRO) 20 mg tablet, Take 5 mg by mouth daily, Disp: , Rfl:     Fexofenadine HCl (MUCINEX ALLERGY PO), Take 1-2 tablets by mouth as needed  , Disp: , Rfl:     glimepiride (AMARYL) 2 mg tablet, Take 2 mg by mouth every morning before breakfast, Disp: , Rfl:     Hydrocodone-Acetaminophen (VICODIN PO), Take by mouth daily, Disp: , Rfl:     Ibuprofen (MOTRIN PO), Take by mouth as needed, Disp: , Rfl:     lidocaine (LIDODERM) 5 %, Place 1 patch on the skin daily Remove & Discard patch within 12 hours or as directed by MD, Disp: 30 patch, Rfl: 0    Liraglutide (VICTOZA SC), Inject under the skin daily, Disp: , Rfl:     metFORMIN (GLUCOPHAGE) 500 mg tablet, Take 500 mg by mouth 3 (three) times a day with meals  , Disp: , Rfl:     oxybutynin (DITROPAN-XL) 5 mg 24 hr tablet, Take 1 tablet (5 mg total) by mouth daily, Disp: 30 tablet, Rfl: 11    SIMVASTATIN PO, Take 20 mg by mouth daily  , Disp: , Rfl:     TRAMADOL HCL PO, Take by mouth as needed, Disp: , Rfl:     zolpidem (AMBIEN) 10 mg tablet, Take by mouth daily at bedtime, Disp: , Rfl:         Clemon Canavan, MD

## 2018-04-19 NOTE — TELEPHONE ENCOUNTER
Pt c/o supra pubic pain and feeling of being flushed  States he thinks he is emptying his bladder ok  Gave him next available appt with Dr Rowena Preciado which is this afternoon  Will call him if he has cancellations

## 2018-04-19 NOTE — PROGRESS NOTES
[address] [facility]  [facility]  [address] [facility] [place]  [place], [address] [facility]  [phone]  www  [facility]  org      NAME: Mariana Hernandez  AGE: 67 y o  SEX: male  : 1945   MRN: [id number]    DATE: 2018  TIME: 4:20 PM    Assessment and Plan: SP pain/urgency- probably passing small stone, feels better now  No need for further treatment /investigation  CAP- PSA under good control, f/uas scheduled with PSAin July  Chief Complaint   No chief complaint on file  History of Present Illness   CAP- on ADT window- PSA 0 82 2018  Woke up with sharp pain in SP region this AM- went to [place] and "pissed like a bandit"- called and was worked in for appt  U/A negative except for small glucose  2017 CT showed tiny bilateral renal calculi- may be passing a stone  Has passed at least 11 stones in the past       The following portions of the patient's history were reviewed and updated as appropriate: allergies, current medications, past family history, past medical history, past social history, past surgical history and problem list     Review of Systems   Review of Systems    Active Problem List   There is no problem list on file for this patient        Objective   /80   Ht 5' 8" (1 727 m)   Wt 110 kg (242 lb)   BMI 36 80 kg/m²     Physical Exam        Current Medications     Current Outpatient Prescriptions:     allopurinol (ZYLOPRIM) 100 mg tablet, , Disp: , Rfl:     amitriptyline (ELAVIL) 10 mg tablet, Take by mouth, Disp: , Rfl:     aspirin 81 MG tablet, Take 81 mg by mouth, Disp: , Rfl:     azelastine (ASTELIN) 0 1 % nasal spray, 2 sprays into each nostril 3 (three) times a day Use in each nostril as directed  , Disp: , Rfl:     azithromycin (ZITHROMAX) 250 mg tablet, , Disp: , Rfl:     celecoxib (CeleBREX) 200 mg capsule, , Disp: , Rfl:     chlorhexidine (PERIDEX) 0 12 % solution, , Disp: , Rfl:     clonazePAM (KlonoPIN) 0 5 mg tablet, Take 0 5 mg by mouth 3 (three) times a day, Disp: , Rfl:     escitalopram (LEXAPRO) 20 mg tablet, Take 5 mg by mouth daily, Disp: , Rfl:     Fexofenadine HCl (MUCINEX ALLERGY PO), Take 1-2 tablets by mouth as needed  , Disp: , Rfl:     glimepiride (AMARYL) 2 mg tablet, Take 2 mg by mouth every morning before breakfast, Disp: , Rfl:     Hydrocodone-Acetaminophen (VICODIN PO), Take by mouth daily, Disp: , Rfl:     Ibuprofen (MOTRIN PO), Take by mouth as needed, Disp: , Rfl:     lidocaine (LIDODERM) 5 %, Place 1 patch on the skin daily Remove & Discard patch within 12 hours or as directed by MD, Disp: 30 patch, Rfl: 0    Liraglutide (VICTOZA SC), Inject under the skin daily, Disp: , Rfl:     metFORMIN (GLUCOPHAGE) 500 mg tablet, Take 500 mg by mouth 3 (three) times a day with meals  , Disp: , Rfl:     oxybutynin (DITROPAN-XL) 5 mg 24 hr tablet, Take 1 tablet (5 mg total) by mouth daily, Disp: 30 tablet, Rfl: 11    SIMVASTATIN PO, Take 20 mg by mouth daily  , Disp: , Rfl:     TRAMADOL HCL PO, Take by mouth as needed, Disp: , Rfl:     zolpidem (AMBIEN) 10 mg tablet, Take by mouth daily at bedtime, Disp: , Rfl:         Jordyn Haque MD

## 2018-04-23 ENCOUNTER — APPOINTMENT (OUTPATIENT)
Dept: PHYSICAL THERAPY | Facility: REHABILITATION | Age: 73
End: 2018-04-23
Payer: MEDICARE

## 2018-04-26 ENCOUNTER — APPOINTMENT (OUTPATIENT)
Dept: PHYSICAL THERAPY | Facility: REHABILITATION | Age: 73
End: 2018-04-26
Payer: MEDICARE

## 2018-04-27 ENCOUNTER — TELEPHONE (OUTPATIENT)
Dept: UROLOGY | Facility: AMBULATORY SURGERY CENTER | Age: 73
End: 2018-04-27

## 2018-04-27 NOTE — TELEPHONE ENCOUNTER
I talked with the patients wife and she said his muscle loss,staying in bed and not walking well has been for 3 years his PCP gave this patient the Tramadol and Vicodin and knows about his problems  He does have an ov with his PCP on this Monday  She is also calling them today  She wanted our advice on this  His daughter also thinks something is wrong with her father  I told the wife to let his PCP know he is taking those meds too close and give him an update on what is going on now  Any orders  ?

## 2018-05-01 ENCOUNTER — OFFICE VISIT (OUTPATIENT)
Dept: PHYSICAL THERAPY | Facility: REHABILITATION | Age: 73
End: 2018-05-01
Payer: MEDICARE

## 2018-05-01 DIAGNOSIS — R53.1 WEAKNESS GENERALIZED: Primary | ICD-10-CM

## 2018-05-01 PROCEDURE — 97110 THERAPEUTIC EXERCISES: CPT | Performed by: PHYSICAL THERAPIST

## 2018-05-01 NOTE — PROGRESS NOTES
Re-evaluation / Daily Note     Today's date: 2018  Patient name: Bernardo Montanez  : 1945  MRN: [id number]  Referring provider: Sohail Hanna MD  Dx:   Encounter Diagnosis     ICD-10-CM    1  Weakness generalized R53 1         Subjective: Patient had a couple bouts of kidney stones since last appointment 18  He reports general hip stiffness today, but was shocked that yesterday they were not painful  Objective: See treatment diary below  Outcome Measures    6 Minute Walk Test (ft): NT   2 Minute Walk Test (ft): 310 feet   10MWT 14 0 sec   5x Sit To Stand (s): 23 5 sec    TUG: NT   Short step lengths with increased double leg stance time  Decreased hip extension throughout      Specialty Daily Treatment Diary      Manual   18       Hip LA distraction     10 min with hooklying hip extension stretching       STM to lumbar paraspinals  10 min                                                          Exercise Diary   18       Sit <> stands    20" surface, 12 x 2 sets    21" surface, 10       4-way hip  flex/abd/ext 10x ea flexion, 10 each  Abduction, 10 each  Extension, 10 each  flexion, abduction, extension, 10 each       Heel raises    15 x 2 sets 10  10 each with increased weight bearing       bridge   10  10 each with increased weight bearing     PPT 10x10 sec holds  5 sec x 10        HL trunk rotation 15x B 5 sec x 5 each Seated trunk rotation stretch, 1 min each     Supine hip Add 10 x 5 sec holds           Supine marches 15x B  12 B         Trunk flex stretch on physioball 1 min       physioball roll-outs  15x           Ambulation  3 min            Step-ups    4" step, railings, 15 each, 2nd set of 10  4" step, lateral, plinth anterior, 15 each, then 12 each       Dynamic balance   heel to toe weight shift, 30 sec  heel to toe weight shift, 1 min       Static balance             Paraspinal release with firm ball  R lumbar 2 min  3 min trunk flexion (no paraspinal release)                                                                                                                            Assessment:  Patient with continued functional limitations in gait, mobility, and endurance  He hasn't been seen sufficiently to make gains, and given medical episodes of stomach upset and recently kidney stones, he's regressed  Recommend continued physical therapy to achieve the goals below  STG's:     - Patient completes FGA upon next session for improved knowledge of dynamic balance  Not met    - Patient improves EC on foam task of MCSTIB for improved static balance within 4 wk  Not assessed  - Patient is independent with initial home exercise program for improvements at home within 4 wk  Met, verbalizes HEP       - Patient ambulates for 6 consecutive min with appropriate vital sign response and less than a 2 point increase in pain for improved endurance within 4 wk  Not met  LTG's:   - Patient improves distance ambulated on 2MWT by 10% within 8 wk for improved endurance   - Patient decreased time to complete 5x sit <> stand by 3 seconds for decreased risk of falls within 8 wk   - Patient improves gait speed to within 10% of age matched norms within 8 wk   - Patient improves initial score on FGA by 3 points for improved dynamic balance within 8 wk    Plan:  Patient will benefit from skilled outpatient physical therapy 2x/wk for 6-8 wks  A list of appropriate exercises can be found in the objective section of this note  Therapeutic exercise and neuromuscular re-education to improve endurance, LE strength and static/dynamic balance

## 2018-05-01 NOTE — LETTER
May 2, 2018    Lidia Espinosa MD  [address] [place] [address]    Patient: Jluis Medina   YOB: 1945   Date of Visit: 2018     Encounter Diagnosis     ICD-10-CM    1  Weakness generalized R53 1        Dear Dr Danyel Ann:    Please review the attached Plan of Care from [address] [facility] recent visit  Please verify that you agree therapy should continue by signing the attached document and sending it back to our office  If you have any questions or concerns, please don't hesitate to call  Sincerely,    Mandy Rose PT      Referring Provider:      I certify that I have read the below Plan of Care and certify the need for these services furnished under this plan of treatment while under my care  Lidia Espinosa MD  [address] [place] [address] [place]: [phone]          Re-evaluation / Daily Note     Today's date: 2018  Patient name: Jluis Medina  : 1945  MRN: [id number]  Referring provider: Gregorio Giang MD  Dx:   Encounter Diagnosis     ICD-10-CM    1  Weakness generalized R53 1         Subjective: Patient had a couple bouts of kidney stones since last appointment 18  He reports general hip stiffness today, but was shocked that yesterday they were not painful  Objective: See treatment diary below  Outcome Measures    6 Minute Walk Test (ft): NT   2 Minute Walk Test (ft): 310 feet   10MWT 14 0 sec   5x Sit To Stand (s): 23 5 sec    TUG: NT   Short step lengths with increased double leg stance time  Decreased hip extension throughout      Specialty Daily Treatment Diary      Manual   18       Hip LA distraction     10 min with hooklying hip extension stretching       STM to lumbar paraspinals  10 min                                                          Exercise Diary   18       Sit <> stands    20" surface, 12 x 2 sets    21" surface, 10       4-way hip  flex/abd/ext 10x ea flexion, 10 each  Abduction, 10 each  Extension, 10 each  flexion, abduction, extension, 10 each       Heel raises    15 x 2 sets 10  10 each with increased weight bearing       bridge   10  10 each with increased weight bearing     PPT 10x10 sec holds  5 sec x 10        HL trunk rotation 15x B 5 sec x 5 each Seated trunk rotation stretch, 1 min each     Supine hip Add 10 x 5 sec holds           Supine marches 15x B  12 B         Trunk flex stretch on physioball 1 min       physioball roll-outs  15x           Ambulation  3 min            Step-ups    4" step, railings, 15 each, 2nd set of 10  4" step, lateral, plinth anterior, 15 each, then 12 each       Dynamic balance   heel to toe weight shift, 30 sec  heel to toe weight shift, 1 min       Static balance             Paraspinal release with firm ball  R lumbar 2 min  3 min trunk flexion (no paraspinal release)                                                                                                                            Assessment:  Patient with continued functional limitations in gait, mobility, and endurance  He hasn't been seen sufficiently to make gains, and given medical episodes of stomach upset and recently kidney stones, he's regressed  Recommend continued physical therapy to achieve the goals below  STG's:     - Patient completes FGA upon next session for improved knowledge of dynamic balance  Not met    - Patient improves EC on foam task of MCSTIB for improved static balance within 4 wk  Not assessed  - Patient is independent with initial home exercise program for improvements at home within 4 wk  Met, verbalizes HEP       - Patient ambulates for 6 consecutive min with appropriate vital sign response and less than a 2 point increase in pain for improved endurance within 4 wk  Not met       LTG's:   - Patient improves distance ambulated on 2MWT by 10% within 8 wk for improved endurance   - Patient decreased time to complete 5x sit <> stand by 3 seconds for decreased risk of falls within 8 wk   - Patient improves gait speed to within 10% of age matched norms within 8 wk   - Patient improves initial score on FGA by 3 points for improved dynamic balance within 8 wk    Plan:  Patient will benefit from skilled outpatient physical therapy 2x/wk for 6-8 wks  A list of appropriate exercises can be found in the objective section of this note  Therapeutic exercise and neuromuscular re-education to improve endurance, LE strength and static/dynamic balance

## 2018-05-03 ENCOUNTER — OFFICE VISIT (OUTPATIENT)
Dept: PHYSICAL THERAPY | Facility: REHABILITATION | Age: 73
End: 2018-05-03
Payer: MEDICARE

## 2018-05-03 DIAGNOSIS — R29.898 LEG WEAKNESS, BILATERAL: Primary | ICD-10-CM

## 2018-05-03 PROCEDURE — 97110 THERAPEUTIC EXERCISES: CPT | Performed by: PHYSICAL THERAPIST

## 2018-05-03 NOTE — PROGRESS NOTES
Re-evaluation / Daily Note     Today's date: 5/3/2018  Patient name: Neo Hunt  : 1945  MRN: [id number]  Referring provider: Broderick Siemens, MD  Dx:   Encounter Diagnosis     ICD-10-CM    1  Leg weakness, bilateral R29 898         Subjective: Patient able to complete home exercise program        Objective: See treatment diary below  Outcome Measures    6 Minute Walk Test (ft): NT   2 Minute Walk Test (ft): 310 feet   10MWT 14 0 sec   5x Sit To Stand (s): 23 5 sec    TUG: NT   Short step lengths with increased double leg stance time  Decreased hip extension throughout      Specialty Daily Treatment Diary      Manual   18     Hip long axis distraction     10 min with hooklying hip extension stretching  10 min with hooklying hip extension stretching     STM to lumbar paraspinals  10 min                                                          Exercise Diary   18     Sit <> stands    20" surface, 12 x 2 sets    21" surface, 10  21" surface, 15 x 2 sets     4-way hip  flex/abd/ext 10x ea flexion, 10 each  Abduction, 10 each  Extension, 10 each  flexion, abduction, extension, 10 each  flexion, abduction, extension, 12 each     Heel raises    15 x 2 sets 10  10 each with increased weight bearing  20  10 each with increased weight bearing     bridge   10  10 each with increased weight bearing 15 increased weight on one side    PPT 10x10 sec holds  5 sec x 10        HL trunk rotation 15x B 5 sec x 5 each Seated trunk rotation stretch, 1 min each     Supine hip Add 10 x 5 sec holds     sidelying hip abduction, 12 each     Supine marches 15x B  12 B    SLR, 12 each     Trunk flex stretch on physioball 1 min       physioball roll-outs  15x           Ambulation  3 min            Step-ups    4" step, railings, 15 each, 2nd set of 10  4" step, lateral, plinth anterior, 15 each, then 12 each   4" step, lateral, plinth anterior, 15 each     Dynamic balance   heel to toe weight shift, 30 sec  heel to toe weight shift, 1 min  heel to toe weight shift, 1 min  FTEC, 1 min     Static balance             Paraspinal release with firm ball  R lumbar 2 min  3 min trunk flexion (no paraspinal release)                                                                                                                            Assessment:  Patient able to complete exercises with minimal provocation of pain  Progress as tolerated  Plan:  Patient will benefit from skilled outpatient physical therapy 2x/wk for 6-8 wks  A list of appropriate exercises can be found in the objective section of this note  Therapeutic exercise and neuromuscular re-education to improve endurance, LE strength and static/dynamic balance

## 2018-05-07 ENCOUNTER — OFFICE VISIT (OUTPATIENT)
Dept: PHYSICAL THERAPY | Facility: REHABILITATION | Age: 73
End: 2018-05-07
Payer: MEDICARE

## 2018-05-07 DIAGNOSIS — R53.1 WEAKNESS GENERALIZED: Primary | ICD-10-CM

## 2018-05-07 PROCEDURE — 97110 THERAPEUTIC EXERCISES: CPT | Performed by: PHYSICAL THERAPIST

## 2018-05-07 NOTE — PROGRESS NOTES
Re-evaluation / Daily Note     Today's date: 2018  Patient name: Krista Hadley  : 1945  MRN: [id number]  Referring provider: Kimberly Garcia MD  Dx:   Encounter Diagnosis     ICD-10-CM    1  Weakness generalized R53 1         Subjective: Patient able to complete home exercise program    He felt good this past Friday, then had R anterior foot and anterior ankle pain, reports he had more prominent veins  This abated but still continues somewhat, unsure whether this is related to standing or sitting position  Objective: See treatment diary below  Outcome Measures    6 Minute Walk Test (ft): NT   2 Minute Walk Test (ft): 310 feet   10MWT 14 0 sec   5x Sit To Stand (s): 23 5 sec    TUG: NT   Short step lengths with increased double leg stance time  Decreased hip extension throughout      Specialty Daily Treatment Diary      Manual   18   Hip long axis distraction     10 min with hooklying hip extension stretching  10 min with hooklying hip extension stretching  5 min hooklying, with hip extension stretches 5 min   STM to lumbar paraspinals  10 min                                                          Exercise Diary   18   Sit <> stands    20" surface, 12 x 2 sets    21" surface, 10  21" surface, 15 x 2 sets  20" surface, 15    4-way hip  flex/abd/ext 10x ea flexion, 10 each  Abduction, 10 each  Extension, 10 each  flexion, abduction, extension, 10 each  flexion, abduction, extension, 12 each  standing hip abduction, extension, light TB, 10 each each exercise  Standing hip [id number] each   Heel raises    15 x 2 sets 10  10 each with increased weight bearing  20  10 each with increased weight bearing 15 total B   bridge   10  10 each with increased weight bearing 15 increased weight on one side 10 increased one-sided weight beraing   PPT 10x10 sec holds  5 sec x 10     bilateral bent knee raise, 10 x 2 sets   HL trunk rotation 15x B 5 sec x 5 each Seated trunk rotation stretch, 1 min each     Supine hip Add 10 x 5 sec holds     sidelying hip abduction, 12 each     Supine marches 15x B  12 B    SLR, 12 each  SLR 15 each   Trunk flex stretch on physioball 1 min       physioball roll-outs  15x           Ambulation  3 min            Step-ups    4" step, railings, 15 each, 2nd set of 10  4" step, lateral, plinth anterior, 15 each, then 12 each   4" step, lateral, plinth anterior, 15 each  6" step, ipsilateral railing, 12, 7 on R  10 each with 2 railings   Dynamic balance   heel to toe weight shift, 30 sec  heel to toe weight shift, 1 min  heel to toe weight shift, 1 min  FTEC, 1 min     Static balance             Paraspinal release with firm ball  R lumbar 2 min  3 min trunk flexion (no paraspinal release)                                                                                                                            Assessment:  Visualized the foot and lower leg, no redness or swelling  Patient does have a more prominent vein on his right than his left dorsal ankle, unsure whether this is new or emergent  Patient denies history of venous or arterial disease  No signs of DVT are present  He should attend to whether symptoms are alleviated with sitting with feet elevated  Plan:  Patient will benefit from skilled outpatient physical therapy 2x/wk for 6-8 wks  A list of appropriate exercises can be found in the objective section of this note  Therapeutic exercise and neuromuscular re-education to improve endurance, LE strength and static/dynamic balance

## 2018-05-10 ENCOUNTER — APPOINTMENT (OUTPATIENT)
Dept: PHYSICAL THERAPY | Facility: REHABILITATION | Age: 73
End: 2018-05-10
Payer: MEDICARE

## 2018-05-14 ENCOUNTER — OFFICE VISIT (OUTPATIENT)
Dept: PHYSICAL THERAPY | Facility: REHABILITATION | Age: 73
End: 2018-05-14
Payer: MEDICARE

## 2018-05-14 DIAGNOSIS — R29.898 LEG WEAKNESS, BILATERAL: Primary | ICD-10-CM

## 2018-05-14 PROCEDURE — 97110 THERAPEUTIC EXERCISES: CPT | Performed by: PHYSICAL THERAPIST

## 2018-05-17 ENCOUNTER — EVALUATION (OUTPATIENT)
Dept: PHYSICAL THERAPY | Facility: REHABILITATION | Age: 73
End: 2018-05-17
Payer: MEDICARE

## 2018-05-17 DIAGNOSIS — R29.898 LEG WEAKNESS, BILATERAL: Primary | ICD-10-CM

## 2018-05-17 DIAGNOSIS — R53.1 WEAKNESS GENERALIZED: ICD-10-CM

## 2018-05-17 PROCEDURE — G8979 MOBILITY GOAL STATUS: HCPCS

## 2018-05-17 PROCEDURE — G8978 MOBILITY CURRENT STATUS: HCPCS

## 2018-05-17 PROCEDURE — 97112 NEUROMUSCULAR REEDUCATION: CPT

## 2018-05-17 PROCEDURE — 97110 THERAPEUTIC EXERCISES: CPT

## 2018-05-17 NOTE — LETTER
May 17, 2018    Audi Fofana MD  [address] [facility]    Patient: Roberta Henry   YOB: 1945   Date of Visit: 2018     Encounter Diagnosis     ICD-10-CM    1  Leg weakness, bilateral R29 898    2  Weakness generalized R53 1        Dear Dr Jasen Painter:    Please review the attached Plan of Care from [address] [place] [address] recent visit  Please verify that you agree therapy should continue by signing the attached document and sending it back to our office  If you have any questions or concerns, please don't hesitate to call  Sincerely,    Patricia Walden, PT      Referring Provider:      I certify that I have read the below Plan of Care and certify the need for these services furnished under this plan of treatment while under my care  Audi Fofana MD  [address] [place]: [phone]          PT Initial Evaluation    2018  Roberta Henry  : 1945  MRN: [id number]  ADUB:[phone] (home)   Mobile: [phone] (mobile)  Insurance Information: Payor: [employer]son Stout / Plan: MEDICARE A AND B / Product Type: Medicare A & B Fee for Service /   Referring Provider: Jerry Aj MD    Subjective    HPI: Roberta Henry is a 67 y o  male referred to outpatient physical therapy for   1  Leg weakness, bilateral    2  Weakness generalized        Patient reports he has been completing his HEP, however, it makes him sore  Falls Hx: Denies and recent falls  Hip pain: Current 6/10, Best 4/10, Worst 10/10  Patient Goal: Improve his strength and endurance, work in the garden    Objective     ROM:   WFL throughout B LE, no pain in B hips with passive ROM      L HS length 45 degrees  R HS length 50 degrees    Manual Muscle Testing - Hip Left Right   Flexion 3+ 3+   Adduction 4- 4-   Abduction 4- 4-   External Rotation 4 4     Manual Muscle Testing - Knee Left Right   Flexion 4 4 Extension 5 5     Manual Muscle Testing - Ankle Left Right   Doriflexion 4+ 4+   Plantarflexion NT NT     Gait Assessment:  Decreased gait speed compared to age matched norms, decreased toe clearance B L > R, decreased arm swing B, decreased hip extension in terminal stance, wide ZEKE       Outcome Measures    6 Minute Walk Test (ft): NT   2 Minute Walk Test (ft): 335 feet   10MWT 10 12 sec   5x Sit To Stand (s): 9 81 sec with B UE   TUG: NT     MCTSIB Sway Index   Firm, Eyes Open 0 88   Firm, Eyes Closed 1 78   Foam, Eyes Open Foam 2 21   Foam, Eyes Closed Foam Falls in 22 sec     Dynamic Gait Index  3Gait level surface  3Change in gait speed  2Gait with horizontal head turns  3Gait with vertical head turns  3Gait and pivot turn  2Step over obstacle  3Step around obstacles  2Steps  21/24 Total score    Precautions: Hx of cancer, diabetes type 2, hypertension    Specialty Daily Treatment Diary   Manual   5/03/18 5/07/18 5/14/18   Hip long axis distraction  10 min with hooklying hip extension stretching  5 min hooklying, with hip extension stretches 5 min 1 min each   STM to lumbar paraspinals                                             Exercise Diary   5/07/18 5/14/18 5/17/18   Sit <> stands  20" surface, 15  19" surface, 10 x 2 sets 5x from 17" surface with B UE   4-way hip  standing hip abduction, extension, light TB, 10 each each exercise  Standing hip [id number] each Standing hip extension 10 each  Standing hip abduction 10 each    Heel raises 15 total B 15    bridge 10 increased one-sided weight beraing 10 x 2 sets    PPT  bilateral bent knee raise, 10 x 2 sets hooklying bent knee raise, 10 each  SLR, 10 each x 2 sets    HL trunk rotation        Supine hip Add        Supine marches  SLR 15 each      Trunk flex stretch on physioball        physioball roll-outs        Ambulation     6 min    Step-ups  6" step, ipsilateral railing, 12, 7 on R  10 each with 2 railings 6" step 10-15 each    Dynamic balance     DGI Static balance     MCTSIB    hip stretches/other stretches   Knee to chest stretch, 1 B  Hip ER stretch, 1 B  Hip flexor stretch, 1 B Knee to chest stretch, 1 B  Hip ER stretch, 1 B  Hip flexor stretch, 1 B     Assessment/Plan    Assessment:  Patient continues to presents to outpatient physical therapy with decreased strength, balance, endurance, pain that limits function, and gait deviations including those listed in observation section  He has made improvements on static balance while standing on a firm surface as well as improved gait speed as indicated by his 10MWT  Patient has improved ambulation in the clinic with increases in step height, however, as reported by his wife he continues to shuffle at home  The patient's impairments are limiting the patient's ability to stair climb, ambulate for longer periods of time without pain, and perform a sit <> stand without an increase in B hip pain  Patient will benefit from skilled outpatient physical therapy to address the above impairments in order to improve patient the patient's QOL       STG's:     - Patient completes FGA upon next session for improved knowledge of dynamic balance - MET with DGI    - Patient improves EC on foam task of MCSTIB for improved static balance within 4 wk - Not met    - Patient is independent with initial home exercise program for improvements at home within 4 wk - MET    - Patient ambulates for 6 consecutive min with appropriate vital sign response and less than a 2 point increase in pain for improved endurance within 4 wk - MET, patient ambulates 6 min with no increase in hip pain  LTG's:   - Patient improves distance ambulated on 2MWT by 10% within 8 wk for improved endurance - Not met   - Patient decreased time to complete 5x sit <> stand by 3 seconds for decreased risk of falls within 8 wk - Ongoing, however, improved    - Patient improves gait speed to within 10% of age matched norms within 8 wk - Not met   - Patient improves initial score on DGI by 3 points for improved dynamic balance within 8 wk - Not met     Plan:  Patient will benefit from skilled outpatient physical therapy 2x/wk for 8 wk  Continue to challenge patient with therapeutic exercises, neuromuscular re-education, and updated HEP's

## 2018-05-17 NOTE — PROGRESS NOTES
PT Initial Evaluation    2018  Nathanael Wheeler  : 1945  MRN: [id number]  LJSQ:[phone] (home)   Mobile: [phone] (mobile)  Insurance Information: Payor: Wicho Montesail / Plan: MEDICARE A AND B / Product Type: Medicare A & B Fee for Service /   Referring Provider: Reina Jefferson MD    Subjective    HPI: Nathanael Wheeler is a 67 y o  male referred to outpatient physical therapy for   1  Leg weakness, bilateral    2  Weakness generalized        Patient reports he has been completing his HEP, however, it makes him sore  Falls Hx: Denies and recent falls  Hip pain: Current 6/10, Best 4/10, Worst 10/10  Patient Goal: Improve his strength and endurance, work in the garden    Objective     ROM:   WFL throughout B LE, no pain in B hips with passive ROM  L HS length 45 degrees  R HS length 50 degrees    Manual Muscle Testing - Hip Left Right   Flexion 3+ 3+   Adduction 4- 4-   Abduction 4- 4-   External Rotation 4 4     Manual Muscle Testing - Knee Left Right   Flexion 4 4   Extension 5 5     Manual Muscle Testing - Ankle Left Right   Doriflexion 4+ 4+   Plantarflexion NT NT     Gait Assessment:  Decreased gait speed compared to age matched norms, decreased toe clearance B L > R, decreased arm swing B, decreased hip extension in terminal stance, wide ZEKE       Outcome Measures    6 Minute Walk Test (ft): NT   2 Minute Walk Test (ft): 335 feet   10MWT 10 12 sec   5x Sit To Stand (s): 9 81 sec with B UE   TUG: NT     MCTSIB Sway Index   Firm, Eyes Open 0 88   Firm, Eyes Closed 1 78   Foam, Eyes Open Foam 2 21   Foam, Eyes Closed Foam Falls in 22 sec     Dynamic Gait Index  3Gait level surface  3Change in gait speed  2Gait with horizontal head turns  3Gait with vertical head turns  3Gait and pivot turn  2Step over obstacle  3Step around obstacles  2Steps   Total score    Precautions: Hx of cancer, diabetes type 2, hypertension    Specialty Daily Treatment Diary   Manual   18   Hip long axis distraction  10 min with hooklying hip extension stretching  5 min hooklying, with hip extension stretches 5 min 1 min each   STM to lumbar paraspinals                                             Exercise Diary   5/07/18 5/14/18 5/17/18   Sit <> stands  20" surface, 15  19" surface, 10 x 2 sets 5x from 17" surface with B UE   4-way hip  standing hip abduction, extension, light TB, 10 each each exercise  Standing hip [id number] each Standing hip extension 10 each  Standing hip abduction 10 each    Heel raises 15 total B 15    bridge 10 increased one-sided weight beraing 10 x 2 sets    PPT  bilateral bent knee raise, 10 x 2 sets hooklying bent knee raise, 10 each  SLR, 10 each x 2 sets    HL trunk rotation        Supine hip Add        Supine marches  SLR 15 each      Trunk flex stretch on physioball        physioball roll-outs        Ambulation     6 min    Step-ups  6" step, ipsilateral railing, 12, 7 on R  10 each with 2 railings 6" step 10-15 each    Dynamic balance     DGI   Static balance     MCTSIB    hip stretches/other stretches   Knee to chest stretch, 1 B  Hip ER stretch, 1 B  Hip flexor stretch, 1 B Knee to chest stretch, 1 B  Hip ER stretch, 1 B  Hip flexor stretch, 1 B     Assessment/Plan    Assessment:  Patient continues to presents to outpatient physical therapy with decreased strength, balance, endurance, pain that limits function, and gait deviations including those listed in observation section  He has made improvements on static balance while standing on a firm surface as well as improved gait speed as indicated by his 10MWT  Patient has improved ambulation in the clinic with increases in step height, however, as reported by his wife he continues to shuffle at home  The patient's impairments are limiting the patient's ability to stair climb, ambulate for longer periods of time without pain, and perform a sit <> stand without an increase in B hip pain   Patient will benefit from skilled outpatient physical therapy to address the above impairments in order to improve patient the patient's QOL  STG's:     - Patient completes FGA upon next session for improved knowledge of dynamic balance - MET with DGI    - Patient improves EC on foam task of MCSTIB for improved static balance within 4 wk - Not met    - Patient is independent with initial home exercise program for improvements at home within 4 wk - MET    - Patient ambulates for 6 consecutive min with appropriate vital sign response and less than a 2 point increase in pain for improved endurance within 4 wk - MET, patient ambulates 6 min with no increase in hip pain  LTG's:   - Patient improves distance ambulated on 2MWT by 10% within 8 wk for improved endurance - Not met   - Patient decreased time to complete 5x sit <> stand by 3 seconds for decreased risk of falls within 8 wk - Ongoing, however, improved    - Patient improves gait speed to within 10% of age matched norms within 8 wk - Not met   - Patient improves initial score on DGI by 3 points for improved dynamic balance within 8 wk - Not met     Plan:  Patient will benefit from skilled outpatient physical therapy 2x/wk for 8 wk  Continue to challenge patient with therapeutic exercises, neuromuscular re-education, and updated HEP's

## 2018-05-21 ENCOUNTER — OFFICE VISIT (OUTPATIENT)
Dept: PHYSICAL THERAPY | Facility: REHABILITATION | Age: 73
End: 2018-05-21
Payer: MEDICARE

## 2018-05-21 DIAGNOSIS — R29.898 LEG WEAKNESS, BILATERAL: Primary | ICD-10-CM

## 2018-05-21 PROCEDURE — 97110 THERAPEUTIC EXERCISES: CPT

## 2018-05-21 NOTE — PROGRESS NOTES
Re-evaluation / Daily Note     Today's date: 2018  Patient name: Bernardo Montanez  : 1945  MRN: [id number]  Referring provider: Sohail Hanna MD  Dx:   No diagnosis found  Subjective: Patient to therapy with wife, wife reports patient is very inactive, only walking from bedroom to office  Patient reports his back is painful from clearing out boxes, as he sold their house and likely will be moving from the end of         Objective: See treatment diary below    Specialty Daily Treatment Diary      Manual   18    Hip long axis distraction 10 min with hooklying hip extension stretching  10 min with hooklying hip extension stretching  5 min hooklying, with hip extension stretches 5 min 1 min each    STM to lumbar paraspinals                                                     Exercise Diary  18   Sit <> stands  21" surface, 10  21" surface, 15 x 2 sets  20" surface, 15  19" surface, 10 x 2 sets    4-way hip  flexion, abduction, extension, 10 each  flexion, abduction, extension, 12 each  standing hip abduction, extension, light TB, 10 each each exercise  Standing hip [id number] each Standing hip extension 10 each  Standing hip abduction 10 each    Heel raises 10  10 each with increased weight bearing  20  10 each with increased weight bearing 15 total B 15 15x   bridge 10  10 each with increased weight bearing 15 increased weight on one side 10 increased one-sided weight beraing 10 x 2 sets    PPT     bilateral bent knee raise, 10 x 2 sets hooklying bent knee raise, 10 each  SLR, 10 each x 2 sets    HL trunk rotation Seated trunk rotation stretch, 1 min each       Supine hip Add   sidelying hip abduction, 12 each       Supine marches    SLR, 12 each  SLR 15 each  Stand 2 UE 15x   Trunk flex stretch on physioball        physioball roll-outs           Ambulation        t/o session   Step-ups  4" step, lateral, plinth anterior, 15 each, then 12 each   4" step, lateral, plinth anterior, 15 each  6" step, ipsilateral railing, 12, 7 on R  10 each with 2 railings 6" step 10-15 each    Dynamic balance  heel to toe weight shift, 1 min  heel to toe weight shift, 1 min  FTEC, 1 min       Static balance           Paraspinal release with firm ball            hip stretches/other stretches       Knee to chest stretch, 1 B  Hip ER stretch, 1 B  Hip flexor stretch, 1 B SKTC 60" B/L  Piriformis 60" B/L            Sidestep 2 min, backwards gait x 2 min    Recumbant Bike        3 min                                                                  Assessment:  Adjusted there-ex due to increased LE pain after performing HEP  Tolerated adjusted program well with pain reduced to 4/10 after session  Recommend restart HEP on Wednesday as long as he has pain back to baseline- voiced understanding  Plan:  Continue PT per plan of care

## 2018-05-24 ENCOUNTER — APPOINTMENT (OUTPATIENT)
Dept: PHYSICAL THERAPY | Facility: REHABILITATION | Age: 73
End: 2018-05-24
Payer: MEDICARE

## 2018-05-29 ENCOUNTER — APPOINTMENT (OUTPATIENT)
Dept: PHYSICAL THERAPY | Facility: REHABILITATION | Age: 73
End: 2018-05-29
Payer: MEDICARE

## 2018-05-29 RX ORDER — GUAIFENESIN 600 MG
TABLET, EXTENDED RELEASE 12 HR ORAL
Status: ON HOLD | COMMUNITY
End: 2018-08-29 | Stop reason: SDUPTHER

## 2018-05-29 RX ORDER — BACLOFEN 10 MG/1
TABLET ORAL
COMMUNITY
End: 2018-08-28

## 2018-05-29 RX ORDER — BICALUTAMIDE 50 MG/1
TABLET, FILM COATED ORAL
COMMUNITY
End: 2018-08-28

## 2018-05-29 RX ORDER — MONTELUKAST SODIUM 10 MG/1
TABLET ORAL DAILY
COMMUNITY

## 2018-05-29 RX ORDER — DULOXETIN HYDROCHLORIDE 60 MG/1
CAPSULE, DELAYED RELEASE ORAL
COMMUNITY
End: 2018-10-31

## 2018-05-29 RX ORDER — NICOTINE POLACRILEX 4 MG/1
GUM, CHEWING ORAL DAILY PRN
COMMUNITY

## 2018-05-29 RX ORDER — LORAZEPAM 0.5 MG/1
TABLET ORAL
COMMUNITY
End: 2018-08-28

## 2018-05-29 RX ORDER — BUPRENORPHINE 15 UG/H
PATCH TRANSDERMAL
Status: ON HOLD | COMMUNITY
End: 2018-08-29 | Stop reason: ALTCHOICE

## 2018-05-29 RX ORDER — OMEGA-3 FATTY ACIDS/FISH OIL 300-1000MG
CAPSULE ORAL
Status: ON HOLD | COMMUNITY
End: 2018-08-29 | Stop reason: SDUPTHER

## 2018-05-30 ENCOUNTER — OFFICE VISIT (OUTPATIENT)
Dept: UROLOGY | Facility: MEDICAL CENTER | Age: 73
End: 2018-05-30
Payer: MEDICARE

## 2018-05-30 VITALS
WEIGHT: 242 LBS | SYSTOLIC BLOOD PRESSURE: 102 MMHG | BODY MASS INDEX: 36.68 KG/M2 | HEIGHT: 68 IN | DIASTOLIC BLOOD PRESSURE: 62 MMHG

## 2018-05-30 DIAGNOSIS — M19.90 ARTHRITIS: ICD-10-CM

## 2018-05-30 DIAGNOSIS — R39.82 CHRONIC BLADDER PAIN: Primary | ICD-10-CM

## 2018-05-30 DIAGNOSIS — C61 MALIGNANT NEOPLASM OF PROSTATE (HCC): ICD-10-CM

## 2018-05-30 LAB
SL AMB  POCT GLUCOSE, UA: NORMAL
SL AMB LEUKOCYTE ESTERASE,UA: NORMAL
SL AMB POCT BILIRUBIN,UA: NORMAL
SL AMB POCT BLOOD,UA: NORMAL
SL AMB POCT CLARITY,UA: CLEAR
SL AMB POCT COLOR,UA: YELLOW
SL AMB POCT KETONES,UA: NORMAL
SL AMB POCT NITRITE,UA: NORMAL
SL AMB POCT PH,UA: 5
SL AMB POCT SPECIFIC GRAVITY,UA: 1.01
SL AMB POCT URINE PROTEIN: NORMAL
SL AMB POCT UROBILINOGEN: 0.2

## 2018-05-30 PROCEDURE — 99214 OFFICE O/P EST MOD 30 MIN: CPT | Performed by: UROLOGY

## 2018-05-30 PROCEDURE — 81003 URINALYSIS AUTO W/O SCOPE: CPT | Performed by: UROLOGY

## 2018-05-30 NOTE — PROGRESS NOTES
Assessment/Plan:  1  Pain is very low back, bilateral, radiates to hips and legs  Very unlikely this is related to kidney stones  2   Urinalysis is clear, no white cells or red cells  3   Recommend he discussed the family doctor regarding the back pain  4   Follow-up three months, continue hormone therapy  No problem-specific Assessment & Plan notes found for this encounter  Diagnoses and all orders for this visit:    Chronic bladder pain  -     POCT urine dip auto non-scope    Malignant neoplasm of prostate (HCC)    Arthritis    Other orders  -     Ibuprofen (ADVIL) 200 MG CAPS; As Needed  PRN  -     baclofen 10 mg tablet; baclofen 10 mg tablet  -     bicalutamide (CASODEX) 50 mg tablet; Take 1 daily  -     Buprenorphine (BUTRANS) 15 MCG/HR PTWK; Apply patch for 1 week at a time  -     Exenatide (BYETTA 10 MCG PEN) 10 MCG/0 04ML SOPN; Byetta 10 mcg/dose(250 mcg/mL)2 4 mL subcutaneous pen injector  -     Cholecalciferol 1000 units capsule; Take 1,000 Units by mouth  -     DULoxetine (CYMBALTA) 60 mg delayed release capsule; Take 1 capsule (60mg) by mouth once daily   -     LORazepam (ATIVAN) 0 5 mg tablet; as needed  -     guaiFENesin (MUCINEX) 600 mg 12 hr tablet; As Needed if sinuses are cogged once daily  PRN  -     Multiple Vitamins-Minerals (MULTIVITAMIN ADULT PO); Take by mouth  -     Omeprazole 20 MG TBEC; Daily  -     montelukast (SINGULAIR) 10 mg tablet; Daily  -     diclofenac sodium (VOLTAREN) 1 %; Apply 2mg 2 times every day          Subjective:      Patient ID: Teena Dia is a 67 y o  male  1   Patient's major complaint is low back pain, some radiation to the legs and knees  2   He has small stones in both kidney on CT scan September 2017          The following portions of the patient's history were reviewed and updated as appropriate: allergies, current medications, past family history, past medical history, past social history, past surgical history and problem list     Review of Systems   All other systems reviewed and are negative  Objective:      /62   Ht 5' 8" (1 727 m)   Wt 110 kg (242 lb)   BMI 36 80 kg/m²          Physical Exam   Constitutional: He is oriented to person, place, and time  He appears well-developed and well-nourished  No distress  Obese, difficulty walking due to arthritis and back pain   HENT:   Head: Normocephalic and atraumatic  Eyes: Conjunctivae are normal    Cardiovascular: Normal rate and regular rhythm  Pulmonary/Chest: Effort normal and breath sounds normal  No respiratory distress  He has no wheezes  Abdominal: Soft  Bowel sounds are normal  He exhibits no distension and no mass  There is no tenderness  Genitourinary: Testes normal and penis normal    Genitourinary Comments: Prostate mildly enlarged no nodules   Neurological: He is alert and oriented to person, place, and time  Skin: Skin is warm and dry  He is not diaphoretic

## 2018-05-30 NOTE — LETTER
May 30, 2018     Keily Arteaga MD  [address] [place] [address]    Patient: Raf Woodall   YOB: 1945   Date of Visit: 5/30/2018       Dear Dr Lindsay Simpson:    Thank you for referring Raf Woodall to me for evaluation  Below are my notes for this consultation  If you have questions, please do not hesitate to call me  I look forward to following your patient along with you  Sincerely,        Mara Jacob MD        CC: No Recipients  Mara Jacob MD  5/30/2018  1:41 PM  Sign at close encounter  Assessment/Plan:  1  Pain is very low back, bilateral, radiates to hips and legs  Very unlikely this is related to kidney stones  2   Urinalysis is clear, no white cells or red cells  3   Recommend he discussed the family doctor regarding the back pain  4   Follow-up three months, continue hormone therapy  No problem-specific Assessment & Plan notes found for this encounter  Diagnoses and all orders for this visit:    Chronic bladder pain  -     POCT urine dip auto non-scope    Malignant neoplasm of prostate (HCC)    Arthritis    Other orders  -     Ibuprofen (ADVIL) 200 MG CAPS; As Needed  PRN  -     baclofen 10 mg tablet; baclofen 10 mg tablet  -     bicalutamide (CASODEX) 50 mg tablet; Take 1 daily  -     Buprenorphine (BUTRANS) 15 MCG/HR PTWK; Apply patch for 1 week at a time  -     Exenatide (BYETTA 10 MCG PEN) 10 MCG/0 04ML SOPN; Byetta 10 mcg/dose(250 mcg/mL)2 4 mL subcutaneous pen injector  -     Cholecalciferol 1000 units capsule; Take 1,000 Units by mouth  -     DULoxetine (CYMBALTA) 60 mg delayed release capsule; Take 1 capsule (60mg) by mouth once daily   -     LORazepam (ATIVAN) 0 5 mg tablet; as needed  -     guaiFENesin (MUCINEX) 600 mg 12 hr tablet; As Needed if sinuses are cogged once daily  PRN  -     Multiple Vitamins-Minerals (MULTIVITAMIN ADULT PO);  Take by mouth  -     Omeprazole 20 MG TBEC; Daily  -     montelukast (SINGULAIR) 10 mg tablet; Daily  -     diclofenac sodium (VOLTAREN) 1 %; Apply 2mg 2 times every day          Subjective:      Patient ID: Teena Dia is a 67 y o  male  1   Patient's major complaint is low back pain, some radiation to the legs and knees  2   He has small stones in both kidney on CT scan September 2017  The following portions of the patient's history were reviewed and updated as appropriate: allergies, current medications, past family history, past medical history, past social history, past surgical history and problem list     Review of Systems   All other systems reviewed and are negative  Objective:      /62   Ht 5' 8" (1 727 m)   Wt 110 kg (242 lb)   BMI 36 80 kg/m²           Physical Exam   Constitutional: He is oriented to person, place, and time  He appears well-developed and well-nourished  No distress  Obese, difficulty walking due to arthritis and back pain   HENT:   Head: Normocephalic and atraumatic  Eyes: Conjunctivae are normal    Cardiovascular: Normal rate and regular rhythm  Pulmonary/Chest: Effort normal and breath sounds normal  No respiratory distress  He has no wheezes  Abdominal: Soft  Bowel sounds are normal  He exhibits no distension and no mass  There is no tenderness  Genitourinary: Testes normal and penis normal    Genitourinary Comments: Prostate mildly enlarged no nodules   Neurological: He is alert and oriented to person, place, and time  Skin: Skin is warm and dry  He is not diaphoretic

## 2018-05-31 ENCOUNTER — APPOINTMENT (OUTPATIENT)
Dept: PHYSICAL THERAPY | Facility: REHABILITATION | Age: 73
End: 2018-05-31
Payer: MEDICARE

## 2018-06-05 ENCOUNTER — EVALUATION (OUTPATIENT)
Dept: PHYSICAL THERAPY | Facility: REHABILITATION | Age: 73
End: 2018-06-05
Payer: MEDICARE

## 2018-06-05 ENCOUNTER — TELEPHONE (OUTPATIENT)
Dept: UROLOGY | Facility: CLINIC | Age: 73
End: 2018-06-05

## 2018-06-05 DIAGNOSIS — C61 MALIGNANT NEOPLASM OF PROSTATE (HCC): Primary | ICD-10-CM

## 2018-06-05 PROCEDURE — G8980 MOBILITY D/C STATUS: HCPCS

## 2018-06-05 PROCEDURE — 97112 NEUROMUSCULAR REEDUCATION: CPT | Performed by: PHYSICAL THERAPIST

## 2018-06-05 PROCEDURE — G8978 MOBILITY CURRENT STATUS: HCPCS | Performed by: PHYSICAL THERAPIST

## 2018-06-05 PROCEDURE — 97110 THERAPEUTIC EXERCISES: CPT | Performed by: PHYSICAL THERAPIST

## 2018-06-05 PROCEDURE — G8979 MOBILITY GOAL STATUS: HCPCS | Performed by: PHYSICAL THERAPIST

## 2018-06-05 NOTE — TELEPHONE ENCOUNTER
Patient's wife called requesting tests results from 05/30/18 test   Please call her back [phone]  If no answer, you can leave a detailed message on answering machine

## 2018-06-05 NOTE — PROGRESS NOTES
Re-evaluation / Daily Note     Today's date: 2018  Patient name: Afsaneh Sanchez  : 1945  MRN: [id number]  Referring provider: Blair Arreguin MD  Dx:   Encounter Diagnosis     ICD-10-CM    1  Malignant neoplasm of prostate [facility]) C61         Subjective: Patient present with wife  Patient frequently doesn't get out of his bedroom in the morning until noon, and goes up to bedroom between 8 and 9 pm   Patient reports intermittently completing home exercise program, his wife reports patient is very inactive and sits all day  He does not seem to want to go anywhere, and he is tired when he does  Patient reports after his prior chemotherapy, 50% of the people would improve towards their baseline and 50% of the people would continue to have regressed, and he is in the latter category  Patient's wife found quad cane for patient        Objective: See treatment diary below    Outcome Measures    6 Minute Walk Test (ft): NT   2 Minute Walk Test (ft): 320 feet   10MWT NT   5x Sit To Stand (s): 20 0 sec not pushing on chair, forearm on knees   TUG: NT   Dynamic Gait Index  3/3 Gait level surface  3/3 Change in gait speed  3/3 Gait with horizontal head turns  3/3 Gait with vertical head turns  1/3 Gait and pivot turn   3/3 Step over obstacle  3/3 Step around obstacles  2/3 Steps  20/24 Total score (less than 20/24 indicates increased risk of falls in elderly; at least 22/24 indicates safe ambulators)      Specialty Daily Treatment Diary      Manual  18   Hip long axis distraction 1 min each Long-axis distraction with hip flexor stretches   STM to lumbar paraspinals           Exercise Diary  18   Sit <> stands 19" surface, 10 x 2 sets  10 x 2 sets 19" surface   4-way hip Standing hip extension 10 each  Standing hip abduction 10 each  Standing hip abduction light band 12 each   Heel raises 15 15x    bridge 10 x 2 sets  15    PPT hooklying bent knee raise, 10 each  SLR, 10 each x 2 sets     HL trunk rotation      Supine hip Add      Supine marches  Stand 2 UE 15x    Trunk flex stretch on physioball      physioball roll-outs      Ambulation  t/o session    Step-ups 6" step 10-15 each     Dynamic balance      Static balance      Paraspinal release with firm ball       hip stretches/other stretches Knee to chest stretch, 1 B  Hip ER stretch, 1 B  Hip flexor stretch, 1 B SKTC 60" B/L  Piriformis 60" B/L       Sidestep 2 min, backwards gait x 2 min     Recumbant Bike  3 min                                          Assessment:  Patient with continued impairments in endurance and functional lower extremity strength  It seems that his physical activity level is very low and contributes to impairment in endurance  Generalized hip pain occurs but does not prevent an increase in the level of physical activity  Patient is reporting stress from upcoming move, but he is scoring as "0" on PHQ-9  Therapist encouraged patient and patient's wife again to address sleeping habits with his primary care physician  Patient would benefit from physical therapy if he is willing to complete home exercises and try to increase his physical activity level  If he is not willing to do these things outside of therapy, patient will not likely benefit from physical therapy, as we will not be able to progress patient        STG's:     - Patient completes FGA upon next session for improved knowledge of dynamic balance - MET with DGI    - Patient improves EC on foam task of MCSTIB for improved static balance within 4 wk - Not met    - Patient is independent with initial home exercise program for improvements at home within 4 wk - MET    - Patient ambulates for 6 consecutive min with appropriate vital sign response and less than a 2 point increase in pain for improved endurance within 4 wk - MET, patient ambulates 6 min with no increase in hip pain  LTG's:   - Patient improves distance ambulated on 2MWT by 10% within 8 wk for improved endurance - Not met   - Patient decreased time to complete 5x sit <> stand by 3 seconds for decreased risk of falls within 8 wk - Not met   - Patient improves gait speed to within 10% of age matched norms within 8 wk - Not met   - Patient improves initial score on DGI by 3 points for improved dynamic balance within 8 wk - Not met     Plan:  Patient will benefit from skilled outpatient physical therapy 2x/wk for 8 wk  Continue to challenge patient with therapeutic exercises, neuromuscular re-education, and updated HEP's

## 2018-06-06 ENCOUNTER — APPOINTMENT (OUTPATIENT)
Dept: PHYSICAL THERAPY | Facility: REHABILITATION | Age: 73
End: 2018-06-06
Payer: MEDICARE

## 2018-06-06 ENCOUNTER — APPOINTMENT (OUTPATIENT)
Dept: LAB | Facility: MEDICAL CENTER | Age: 73
End: 2018-06-06
Attending: UROLOGY
Payer: MEDICARE

## 2018-06-06 DIAGNOSIS — N30.90 CYSTITIS: ICD-10-CM

## 2018-06-06 DIAGNOSIS — N30.90 CYSTITIS: Primary | ICD-10-CM

## 2018-06-06 PROCEDURE — 87086 URINE CULTURE/COLONY COUNT: CPT

## 2018-06-06 NOTE — TELEPHONE ENCOUNTER
Spoke with pt and told him he can go for u/c, and order in for that  Pt has hx of this type of pain while passing kidney stones  Told him he can use AZO also  Told him wait for ABX order since his urine was clear with similar c/o last week

## 2018-06-06 NOTE — TELEPHONE ENCOUNTER
Pt was asking for u/c results  Told him it was not done since the urine was clear when he was in office

## 2018-06-06 NOTE — TELEPHONE ENCOUNTER
Spoke with pt who c/o suprapubic pain  He saw Dr Doe Crowder last week who doesn't feel the pain is r/t UTI or kidney stones  U/A was clear  Suggested as per Dr Shanique Menjivar note he call PCP, which he agreed to

## 2018-06-06 NOTE — TELEPHONE ENCOUNTER
Patient called again  Would like to speak to [facility]  He said he's having bad pain, and burning  He would like to have a urine culture done  Please call at [phone]

## 2018-06-07 ENCOUNTER — TELEPHONE (OUTPATIENT)
Dept: UROLOGY | Facility: MEDICAL CENTER | Age: 73
End: 2018-06-07

## 2018-06-07 LAB — BACTERIA UR CULT: NORMAL

## 2018-06-07 NOTE — TELEPHONE ENCOUNTER
As per Dr Helga Schwab, called and left message that if pt still has severe pain to go to ER for assessment  Call back with questions

## 2018-06-08 ENCOUNTER — TELEPHONE (OUTPATIENT)
Dept: UROLOGY | Facility: MEDICAL CENTER | Age: 73
End: 2018-06-08

## 2018-06-08 NOTE — TELEPHONE ENCOUNTER
Patient wanted to know if Azo turns your urine orange,I said yes  I also told him to make sure he drinks 64oz of water in a day ,that this would help him,so the urine is not strong or burn on urination  His wife will try to get him to do this,to call if any problems and they were told his u/c was negative

## 2018-06-11 ENCOUNTER — APPOINTMENT (OUTPATIENT)
Dept: PHYSICAL THERAPY | Facility: REHABILITATION | Age: 73
End: 2018-06-11
Payer: MEDICARE

## 2018-06-13 ENCOUNTER — APPOINTMENT (EMERGENCY)
Dept: CT IMAGING | Facility: HOSPITAL | Age: 73
End: 2018-06-13
Payer: MEDICARE

## 2018-06-13 ENCOUNTER — APPOINTMENT (OUTPATIENT)
Dept: PHYSICAL THERAPY | Facility: REHABILITATION | Age: 73
End: 2018-06-13
Payer: MEDICARE

## 2018-06-13 ENCOUNTER — HOSPITAL ENCOUNTER (EMERGENCY)
Facility: HOSPITAL | Age: 73
Discharge: HOME | End: 2018-06-13
Attending: EMERGENCY MEDICINE | Admitting: EMERGENCY MEDICINE
Payer: MEDICARE

## 2018-06-13 VITALS
BODY MASS INDEX: 35.58 KG/M2 | DIASTOLIC BLOOD PRESSURE: 81 MMHG | TEMPERATURE: 97.6 F | WEIGHT: 234 LBS | RESPIRATION RATE: 15 BRPM | OXYGEN SATURATION: 97 % | HEART RATE: 72 BPM | SYSTOLIC BLOOD PRESSURE: 145 MMHG

## 2018-06-13 DIAGNOSIS — R53.1 WEAKNESS: ICD-10-CM

## 2018-06-13 DIAGNOSIS — R30.0 DYSURIA: ICD-10-CM

## 2018-06-13 DIAGNOSIS — M54.9 BACK PAIN: Primary | ICD-10-CM

## 2018-06-13 LAB
ANION GAP SERPL CALCULATED.3IONS-SCNC: 12 MMOL/L (ref 4–13)
BACTERIA UR QL AUTO: NORMAL /HPF
BASOPHILS # BLD MANUAL: 0.07 THOUSAND/UL (ref 0–0.1)
BASOPHILS NFR MAR MANUAL: 1 % (ref 0–1)
BILIRUB UR QL STRIP: ABNORMAL
BUN SERPL-MCNC: 19 MG/DL (ref 5–25)
CALCIUM SERPL-MCNC: 9.6 MG/DL (ref 8.3–10.1)
CHLORIDE SERPL-SCNC: 100 MMOL/L (ref 100–108)
CLARITY UR: CLEAR
CO2 SERPL-SCNC: 27 MMOL/L (ref 21–32)
COLOR UR: ABNORMAL
CREAT SERPL-MCNC: 1.29 MG/DL (ref 0.6–1.3)
EOSINOPHIL # BLD MANUAL: 0 THOUSAND/UL (ref 0–0.4)
EOSINOPHIL NFR BLD MANUAL: 0 % (ref 0–6)
ERYTHROCYTE [DISTWIDTH] IN BLOOD BY AUTOMATED COUNT: 12.9 % (ref 11.6–15.1)
GFR SERPL CREATININE-BSD FRML MDRD: 55 ML/MIN/1.73SQ M
GLUCOSE SERPL-MCNC: 212 MG/DL (ref 65–140)
GLUCOSE UR STRIP-MCNC: ABNORMAL MG/DL
HCT VFR BLD AUTO: 41.7 % (ref 36.5–49.3)
HGB BLD-MCNC: 14.3 G/DL (ref 12–17)
HGB UR QL STRIP.AUTO: NEGATIVE
KETONES UR STRIP-MCNC: ABNORMAL MG/DL
LEUKOCYTE ESTERASE UR QL STRIP: NEGATIVE
LYMPHOCYTES # BLD AUTO: 0.77 THOUSAND/UL (ref 0.6–4.47)
LYMPHOCYTES # BLD AUTO: 11 % (ref 14–44)
MCH RBC QN AUTO: 29.6 PG (ref 26.8–34.3)
MCHC RBC AUTO-ENTMCNC: 34.3 G/DL (ref 31.4–37.4)
MCV RBC AUTO: 86 FL (ref 82–98)
MONOCYTES # BLD AUTO: 0 THOUSAND/UL (ref 0–1.22)
MONOCYTES NFR BLD: 0 % (ref 4–12)
NEUTROPHILS # BLD MANUAL: 6.07 THOUSAND/UL (ref 1.85–7.62)
NEUTS SEG NFR BLD AUTO: 87 % (ref 43–75)
NITRITE UR QL STRIP: POSITIVE
NON-SQ EPI CELLS URNS QL MICRO: NORMAL /HPF
NRBC BLD AUTO-RTO: 3 /100 WBCS
PH UR STRIP.AUTO: 5 [PH] (ref 4.5–8)
PLATELET # BLD AUTO: 232 THOUSANDS/UL (ref 149–390)
PLATELET BLD QL SMEAR: ADEQUATE
PMV BLD AUTO: 10.3 FL (ref 8.9–12.7)
POTASSIUM SERPL-SCNC: 4.2 MMOL/L (ref 3.5–5.3)
PROT UR STRIP-MCNC: ABNORMAL MG/DL
RBC # BLD AUTO: 4.83 MILLION/UL (ref 3.88–5.62)
RBC #/AREA URNS AUTO: NORMAL /HPF
RBC MORPH BLD: NORMAL
SODIUM SERPL-SCNC: 139 MMOL/L (ref 136–145)
SP GR UR STRIP.AUTO: 1.02 (ref 1–1.03)
TOTAL CELLS COUNTED SPEC: 100
UROBILINOGEN UR QL STRIP.AUTO: 1 E.U./DL
VARIANT LYMPHS # BLD AUTO: 1 %
WBC # BLD AUTO: 6.98 THOUSAND/UL (ref 4.31–10.16)
WBC #/AREA URNS AUTO: NORMAL /HPF

## 2018-06-13 PROCEDURE — 85027 COMPLETE CBC AUTOMATED: CPT | Performed by: EMERGENCY MEDICINE

## 2018-06-13 PROCEDURE — 99284 EMERGENCY DEPT VISIT MOD MDM: CPT

## 2018-06-13 PROCEDURE — 81001 URINALYSIS AUTO W/SCOPE: CPT

## 2018-06-13 PROCEDURE — 85007 BL SMEAR W/DIFF WBC COUNT: CPT | Performed by: EMERGENCY MEDICINE

## 2018-06-13 PROCEDURE — 80048 BASIC METABOLIC PNL TOTAL CA: CPT | Performed by: EMERGENCY MEDICINE

## 2018-06-13 PROCEDURE — 74176 CT ABD & PELVIS W/O CONTRAST: CPT

## 2018-06-13 PROCEDURE — 36415 COLL VENOUS BLD VENIPUNCTURE: CPT | Performed by: EMERGENCY MEDICINE

## 2018-06-13 RX ORDER — LIDOCAINE 50 MG/G
1 PATCH TOPICAL DAILY
Qty: 30 PATCH | Refills: 0 | Status: SHIPPED | OUTPATIENT
Start: 2018-06-13 | End: 2018-10-31

## 2018-06-13 RX ORDER — ALLOPURINOL 100 MG/1
100 TABLET ORAL DAILY
Qty: 30 TABLET | Refills: 0 | Status: SHIPPED | OUTPATIENT
Start: 2018-06-13 | End: 2018-08-28

## 2018-06-14 NOTE — ED PROVIDER NOTES
History  Chief Complaint   Patient presents with    Flank Pain     Patient R flank pain that radiates into his groin  Patient reports history of kidney stones  Patient reports burning with urination  Patient unsure if there is blood in his urine because he is taking AZO  Patient denies fevers/chills  66 YO male presents with Right flank pain  Pt states he has a previous Hx of kidney stones, he has noticed for the last week frequent urination with some dysuria  Pain has been intermittent, states he has had similar pain in the past but notes it has been more intense  Pt additionally notes he has a bad back, he has seen his PCP for this in the past, has had imaging  Pt denies fevers or chills, has had mild nausea with no vomiting  Pt denies CP/SOB/F/C/N/V/D/C  History provided by:  Patient and spouse   used: No    Flank Pain   Pain location:  R flank  Pain quality: aching    Pain radiates to:  Groin  Pain severity:  Moderate  Onset quality:  Gradual  Duration:  2 hours  Timing:  Intermittent  Progression:  Improving  Chronicity:  Recurrent  Relieved by:  Nothing  Worsened by:  Nothing  Ineffective treatments:  None tried  Associated symptoms: nausea    Associated symptoms: no chest pain, no constipation, no cough, no diarrhea, no dysuria, no fever, no shortness of breath and no vomiting    Nausea:     Severity:  Mild    Onset quality:  Gradual    Duration:  2 hours    Timing:  Intermittent    Progression:  Waxing and waning      Prior to Admission Medications   Prescriptions Last Dose Informant Patient Reported? Taking? Buprenorphine (BUTRANS) 15 MCG/HR PTWK   Yes No   Sig: Apply patch for 1 week at a time  Cholecalciferol 1000 units capsule   Yes No   Sig: Take 1,000 Units by mouth   DULoxetine (CYMBALTA) 60 mg delayed release capsule   Yes No   Sig: Take 1 capsule (60mg) by mouth once daily     Exenatide (BYETTA 10 MCG PEN) 10 MCG/0 04ML SOPN   Yes No   Sig: Byetta 10 mcg/dose(250 mcg/mL)2 4 mL subcutaneous pen injector   Fexofenadine HCl (MUCINEX ALLERGY PO)   Yes No   Sig: Take 1-2 tablets by mouth as needed     Hydrocodone-Acetaminophen (VICODIN PO)   Yes No   Sig: Take by mouth daily   Ibuprofen (ADVIL) 200 MG CAPS   Yes No   Sig: As Needed  PRN   Ibuprofen (MOTRIN PO)   Yes No   Sig: Take by mouth as needed   LORazepam (ATIVAN) 0 5 mg tablet   Yes No   Sig: as needed   Liraglutide (VICTOZA SC)   Yes No   Sig: Inject under the skin daily   Multiple Vitamins-Minerals (MULTIVITAMIN ADULT PO)   Yes No   Sig: Take by mouth   Omeprazole 20 MG TBEC   Yes No   Sig: Daily   SIMVASTATIN PO   Yes No   Sig: Take 20 mg by mouth daily     TRAMADOL HCL PO   Yes No   Sig: Take by mouth as needed   allopurinol (ZYLOPRIM) 100 mg tablet   Yes No   amitriptyline (ELAVIL) 10 mg tablet   Yes No   Sig: Take by mouth   aspirin 81 MG tablet   Yes No   Sig: Take 81 mg by mouth   azelastine (ASTELIN) 0 1 % nasal spray   Yes No   Si sprays into each nostril 3 (three) times a day Use in each nostril as directed     baclofen 10 mg tablet   Yes No   Sig: baclofen 10 mg tablet   bicalutamide (CASODEX) 50 mg tablet   Yes No   Sig: Take 1 daily   celecoxib (CeleBREX) 200 mg capsule   Yes No   chlorhexidine (PERIDEX) 0 12 % solution   Yes No   clonazePAM (KlonoPIN) 0 5 mg tablet   Yes No   Sig: Take 0 5 mg by mouth 3 (three) times a day   diclofenac sodium (VOLTAREN) 1 %   Yes No   Sig: Apply 2mg 2 times every day   escitalopram (LEXAPRO) 20 mg tablet   Yes No   Sig: Take 5 mg by mouth daily   glimepiride (AMARYL) 2 mg tablet   Yes No   Sig: Take 2 mg by mouth every morning before breakfast   guaiFENesin (MUCINEX) 600 mg 12 hr tablet   Yes No   Sig: As Needed if sinuses are cogged once daily  PRN   lidocaine (LIDODERM) 5 %   No No   Sig: Place 1 patch on the skin daily Remove & Discard patch within 12 hours or as directed by MD   metFORMIN (GLUCOPHAGE) 500 mg tablet   Yes No   Sig: Take 500 mg by mouth 3 (three) times a day with meals     montelukast (SINGULAIR) 10 mg tablet   Yes No   Sig: Daily   oxybutynin (DITROPAN-XL) 5 mg 24 hr tablet   No No   Sig: Take 1 tablet (5 mg total) by mouth daily   zolpidem (AMBIEN) 10 mg tablet   Yes No   Sig: Take by mouth daily at bedtime      Facility-Administered Medications: None       Past Medical History:   Diagnosis Date    Arthritis     Chronic bladder pain 06/05/2017    Chronic pain     Diabetes mellitus ([facility] )     Elevated PSA 2016    Frequency of urination     Hematuria, gross 06/15/2017    Hypercholesterolemia     Hyperlipidemia     Kidney stones 11/01/2016    Lymphoma ([facility] )     Malignant neoplasm of prostate ([place] [id number] ) 04/20/2017    Pain in hip     Skin cancer     Sleep apnea        Past Surgical History:   Procedure Laterality Date    CYSTOSCOPY  06/09/2017    PROSTATE BIOPSY  04/06/2017    SINUS SURGERY  2010    SKIN CANCER EXCISION         Family History   Problem Relation Age of Onset    Heart disease Mother      I have reviewed and agree with the history as documented  Social History   Substance Use Topics    Smoking status: Never Smoker    Smokeless tobacco: Never Used    Alcohol use No        Review of Systems   Constitutional: Negative for fever  HENT: Negative for dental problem  Eyes: Negative for visual disturbance  Respiratory: Negative for cough and shortness of breath  Cardiovascular: Negative for chest pain  Gastrointestinal: Positive for nausea  Negative for abdominal pain, constipation, diarrhea and vomiting  Genitourinary: Positive for flank pain  Negative for dysuria and frequency  Musculoskeletal: Negative for neck pain and neck stiffness  Skin: Negative for rash  Neurological: Negative for dizziness, weakness and light-headedness  Psychiatric/Behavioral: Negative for agitation, behavioral problems and confusion  All other systems reviewed and are negative        Physical Exam  Physical Exam Constitutional: He is oriented to person, place, and time  He appears well-developed and well-nourished  HENT:   Head: Normocephalic and atraumatic  Eyes: EOM are normal  Pupils are equal, round, and reactive to light  Neck: Normal range of motion  Cardiovascular: Normal rate, regular rhythm and normal heart sounds  Pulmonary/Chest: Effort normal and breath sounds normal    Abdominal: Soft  There is no tenderness  Musculoskeletal: Normal range of motion  Neurological: He is alert and oriented to person, place, and time  Skin: Skin is warm and dry  Psychiatric: He has a normal mood and affect  His behavior is normal    Nursing note and vitals reviewed  Vital Signs  ED Triage Vitals   Temperature Pulse Respirations Blood Pressure SpO2   06/13/18 1924 06/13/18 1918 06/13/18 1918 06/13/18 1918 06/13/18 1918   97 6 °F (36 4 °C) 88 18 147/75 95 %      Temp Source Heart Rate Source Patient Position - Orthostatic VS BP Location FiO2 (%)   06/13/18 1924 06/13/18 1918 06/13/18 1918 06/13/18 1918 --   Oral Monitor Sitting Right arm       Pain Score       06/13/18 2045       3           Vitals:    06/13/18 1918 06/13/18 2045 06/13/18 2218   BP: 147/75 135/82 145/81   Pulse: 88 69 72   Patient Position - Orthostatic VS: Sitting Lying Lying       Visual Acuity      ED Medications  Medications - No data to display    Diagnostic Studies  Results Reviewed     Procedure Component Value Units Date/Time    CBC and differential [[id number]] Collected:  06/13/18 2043    Lab Status:  Final result Specimen:  Blood from Arm, Right Updated:  06/13/18 2113     WBC 6 98 Thousand/uL      RBC 4 83 Million/uL      Hemoglobin 14 3 g/dL      Hematocrit 41 7 %      MCV 86 fL      MCH 29 6 pg      MCHC 34 3 g/dL      RDW 12 9 %      MPV 10 3 fL      Platelets 577 Thousands/uL      nRBC 3 /100 WBCs     Narrative: This is an appended report  These results have been appended to a previously verified report      Basic metabolic panel [[id number]]  (Abnormal) Collected:  06/13/18 2043    Lab Status:  Final result Specimen:  Blood from Arm, Right Updated:  06/13/18 2107     Sodium 139 mmol/L      Potassium 4 2 mmol/L      Chloride 100 mmol/L      CO2 27 mmol/L      Anion Gap 12 mmol/L      BUN 19 mg/dL      Creatinine 1 29 mg/dL      Glucose 212 (H) mg/dL      Calcium 9 6 mg/dL      eGFR 55 ml/min/1 73sq m     Narrative:         National Kidney Disease Education Program recommendations are as follows:  GFR calculation is accurate only with a steady state creatinine  Chronic Kidney disease less than 60 ml/min/1 73 sq  meters  Kidney failure less than 15 ml/min/1 73 sq  meters  Urine Microscopic [[id number]]  (Normal) Collected:  06/13/18 2005    Lab Status:  Final result Specimen:  Urine from Urine, Other Updated:  06/13/18 2032     RBC, UA None Seen /hpf      WBC, UA None Seen /hpf      Epithelial Cells Occasional /hpf      Bacteria, UA Occasional /hpf     ED Urine Macroscopic [[id number]]  (Abnormal) Collected:  06/13/18 2005    Lab Status:  Final result Specimen:  Urine Updated:  06/13/18 2001     Color, UA Orange     Clarity, UA Clear     pH, UA 5 0     Leukocytes, UA Negative     Nitrite, UA Positive (A)     Protein, UA 30 (1+) (A) mg/dl      Glucose,  (1/10%) (A) mg/dl      Ketones, UA 15 (1+) (A) mg/dl      Urobilinogen, UA 1 0 E U /dl      Bilirubin, UA Interference- unable to analyze (A)     Blood, UA Negative     Specific Gravity, UA 1 020    Narrative:       CLINITEK RESULT                 CT renal stone study abdomen pelvis without contrast   Final Result by Lisette Huber MD (06/13 2118)         1  Nonobstructing 3 mm right renal calculi  No evidence of recent passage of a calculus   2  Cholelithiasis without evidence of cholecystitis  3   Diverticulosis without diverticulitis, colitis or bowel obstruction  Normal appendix               Workstation performed: [id number] Procedures  Procedures       Phone Contacts  ED Phone Contact    ED Course                               MDM  Number of Diagnoses or Management Options  Back pain: new and requires workup  Dysuria: new and requires workup  Weakness: new and requires workup  Diagnosis management comments: 1  Back pain - Pt with ongoing issues with back pain but with worsening symptoms for the last couple hours  Will check CT for kidney stone, urine for infection, electrolytes for kidney function  Amount and/or Complexity of Data Reviewed  Clinical lab tests: ordered and reviewed  Tests in the radiology section of CPT®: ordered and reviewed  Obtain history from someone other than the patient: yes  Discuss the patient with other providers: yes  Independent visualization of images, tracings, or specimens: yes    Patient Progress  Patient progress: stable    CritCare Time    Disposition  Final diagnoses:   Back pain   Weakness   Dysuria     Time reflects when diagnosis was documented in both MDM as applicable and the Disposition within this note     Time User Action Codes Description Comment    6/13/2018 10:02 PM Modena Councilman E Add [M54 9] Back pain     6/13/2018 10:02 PM Eligio Mcpherson, [address] [R53 1] Weakness     6/13/2018 10:02 PM Modena Councilman E Add [R30 0] Dysuria       ED Disposition     ED Disposition Condition Comment    Discharge  Bernardo Neighbor discharge to home/self care      Condition at discharge: Stable        Follow-up Information     Follow up With Specialties Details Why Valentina Mcdermott MD Urology   [place] [id number]  [place] [phone]            Discharge Medication List as of 6/13/2018 10:05 PM      START taking these medications    Details   !! allopurinol (ZYLOPRIM) 100 mg tablet Take 1 tablet (100 mg total) by mouth daily, Starting Wed 6/13/2018, Normal      !! lidocaine (LIDODERM) 5 % Place 1 patch on the skin daily Remove & Discard patch within 12 hours or as directed by MD, Starting Wed 6/13/2018, Normal       !! - Potential duplicate medications found  Please discuss with provider  CONTINUE these medications which have NOT CHANGED    Details   !! allopurinol (ZYLOPRIM) 100 mg tablet Starting Tue 1/9/2018, Historical Med      amitriptyline (ELAVIL) 10 mg tablet Take by mouth, Historical Med      aspirin 81 MG tablet Take 81 mg by mouth, Historical Med      azelastine (ASTELIN) 0 1 % nasal spray 2 sprays into each nostril 3 (three) times a day Use in each nostril as directed  , Historical Med      baclofen 10 mg tablet baclofen 10 mg tablet, Historical Med      bicalutamide (CASODEX) 50 mg tablet Take 1 daily, Historical Med      Buprenorphine (BUTRANS) 15 MCG/HR PTWK Apply patch for 1 week at a time , Historical Med      celecoxib (CeleBREX) 200 mg capsule Starting Mon 1/22/2018, Historical Med      chlorhexidine (PERIDEX) 0 12 % solution Starting Wed 11/8/2017, Historical Med      Cholecalciferol 1000 units capsule Take 1,000 Units by mouth, Starting Tue 9/4/2012, Historical Med      clonazePAM (KlonoPIN) 0 5 mg tablet Take 0 5 mg by mouth 3 (three) times a day, Historical Med      diclofenac sodium (VOLTAREN) 1 % Apply 2mg 2 times every day, Historical Med      DULoxetine (CYMBALTA) 60 mg delayed release capsule Take 1 capsule (60mg) by mouth once daily  , Historical Med      escitalopram (LEXAPRO) 20 mg tablet Take 5 mg by mouth daily, Historical Med      Exenatide (BYETTA 10 MCG PEN) 10 MCG/0 04ML SOPN Byetta 10 mcg/dose(250 mcg/mL)2 4 mL subcutaneous pen injector, Historical Med      Fexofenadine HCl (MUCINEX ALLERGY PO) Take 1-2 tablets by mouth as needed  , Historical Med      glimepiride (AMARYL) 2 mg tablet Take 2 mg by mouth every morning before breakfast, Historical Med      guaiFENesin (MUCINEX) 600 mg 12 hr tablet As Needed if sinuses are cogged once daily  PRN, Historical Med      Hydrocodone-Acetaminophen (VICODIN PO) Take by mouth daily, Historical Med      Ibuprofen (ADVIL) 200 MG CAPS As Needed  PRN, Historical Med      Ibuprofen (MOTRIN PO) Take by mouth as needed, Historical Med      !! lidocaine (LIDODERM) 5 % Place 1 patch on the skin daily Remove & Discard patch within 12 hours or as directed by MD, Starting Thu 9/21/2017, Print      Liraglutide (VICTOZA SC) Inject under the skin daily, Historical Med      LORazepam (ATIVAN) 0 5 mg tablet as needed, Historical Med      metFORMIN (GLUCOPHAGE) 500 mg tablet Take 500 mg by mouth 3 (three) times a day with meals  , Historical Med      montelukast (SINGULAIR) 10 mg tablet Daily, Historical Med      Multiple Vitamins-Minerals (MULTIVITAMIN ADULT PO) Take by mouth, Historical Med      Omeprazole 20 MG TBEC Daily, Historical Med      oxybutynin (DITROPAN-XL) 5 mg 24 hr tablet Take 1 tablet (5 mg total) by mouth daily, Starting Mon 1/29/2018, Normal      SIMVASTATIN PO Take 20 mg by mouth daily  , Historical Med      TRAMADOL HCL PO Take by mouth as needed, Historical Med      zolpidem (AMBIEN) 10 mg tablet Take by mouth daily at bedtime, Historical Med       !! - Potential duplicate medications found  Please discuss with provider  No discharge procedures on file      ED Provider  Electronically Signed by           Hildegarde Severin, MD  06/14/18 0030

## 2018-06-14 NOTE — DISCHARGE INSTRUCTIONS
Use the lidoderm patches, covering the painful area  This can be left in place for 12 hours, then it should be removed for 12 hours before another can be re-applied  Call your urologist, you should be seen in the office for continued evaluation and management of your dysuria  Return to the ER if you develop a fever greater than 100 4  Dysuria   WHAT YOU NEED TO KNOW:   Dysuria is difficulty urinating, or pain, burning, or discomfort with urination  Dysuria is usually a symptom of another problem  DISCHARGE INSTRUCTIONS:   Return to the emergency department if:   · You have severe back, side, or abdominal pain  · You have fever and shaking chills  · You vomit several times in a row  Contact your healthcare provider if:   · Your symptoms do not go away, even after treatment  · You have questions or concerns about your condition or care  Medicines:   · Medicines  may be given to help treat a bacterial infection or help decrease bladder spasms  · Take your medicine as directed  Contact your healthcare provider if you think your medicine is not helping or if you have side effects  Tell him of her if you are allergic to any medicine  Keep a list of the medicines, vitamins, and herbs you take  Include the amounts, and when and why you take them  Bring the list or the pill bottles to follow-up visits  Carry your medicine list with you in case of an emergency  Follow up with your healthcare provider as directed: Your healthcare provider may also refer you to a urologist or nephrologist to have additional testing  Write down your questions so you remember to ask them during your visits  Manage your dysuria:   · Drink more liquids  Liquids help flush out bacteria that may be causing an infection  Ask your healthcare provider how much liquid to drink each day and which liquids are best for you  · Take sitz baths as directed  Fill a bathtub with 4 to 6 inches of warm water   You may also use a sitz bath pan that fits over a toilet  Sit in the sitz bath for 20 minutes  Do this 2 to 3 times a day, or as directed  The warm water can help decrease pain and swelling  © 2017 [address] Information is for End User's use only and may not be sold, redistributed or otherwise used for commercial purposes  All illustrations and images included in CareNotes® are the copyrighted property of A D A M [employer] Inc  or Tushar Patrick  The above information is an  only  It is not intended as medical advice for individual conditions or treatments  Talk to your doctor, nurse or pharmacist before following any medical regimen to see if it is safe and effective for you

## 2018-06-18 ENCOUNTER — APPOINTMENT (OUTPATIENT)
Dept: PHYSICAL THERAPY | Facility: REHABILITATION | Age: 73
End: 2018-06-18
Payer: MEDICARE

## 2018-06-20 ENCOUNTER — APPOINTMENT (OUTPATIENT)
Dept: PHYSICAL THERAPY | Facility: REHABILITATION | Age: 73
End: 2018-06-20
Payer: MEDICARE

## 2018-06-25 ENCOUNTER — APPOINTMENT (OUTPATIENT)
Dept: PHYSICAL THERAPY | Facility: REHABILITATION | Age: 73
End: 2018-06-25
Payer: MEDICARE

## 2018-06-27 ENCOUNTER — APPOINTMENT (OUTPATIENT)
Dept: PHYSICAL THERAPY | Facility: REHABILITATION | Age: 73
End: 2018-06-27
Payer: MEDICARE

## 2018-07-20 DIAGNOSIS — R35.0 INCREASED URINARY FREQUENCY: ICD-10-CM

## 2018-07-20 NOTE — TELEPHONE ENCOUNTER
Patient called requesting refill on Oxybutynin ER 5mg  Request for same, 90 day supply with NO refills was queued and forwarded to Dr Patel Kumar for approval   Patient scheduled to return to the office on 8/30/18

## 2018-07-23 RX ORDER — OXYBUTYNIN CHLORIDE 5 MG/1
5 TABLET, EXTENDED RELEASE ORAL DAILY
Qty: 90 TABLET | Refills: 0 | Status: SHIPPED | OUTPATIENT
Start: 2018-07-23 | End: 2018-10-19 | Stop reason: SDUPTHER

## 2018-08-15 NOTE — PROGRESS NOTES
Discharge Summary    Patient name: Avtar Sampson  : 1945  MRN: [id number]  Referring provider: Naa Pena MD    Patient called to cancel the remainder of his outpatient physical therapy appointments  The patient met 3/4 of his STG's and no LTG's  He is self-discharging from outpatient physical therapy at this time  He was provided with an HEP to continue with   See re-evaluation for more information about status at D/C

## 2018-08-20 ENCOUNTER — OFFICE VISIT (OUTPATIENT)
Dept: UROLOGY | Facility: MEDICAL CENTER | Age: 73
End: 2018-08-20
Payer: MEDICARE

## 2018-08-20 ENCOUNTER — APPOINTMENT (OUTPATIENT)
Dept: LAB | Facility: MEDICAL CENTER | Age: 73
End: 2018-08-20
Attending: UROLOGY
Payer: MEDICARE

## 2018-08-20 ENCOUNTER — TRANSCRIBE ORDERS (OUTPATIENT)
Dept: ADMINISTRATIVE | Facility: HOSPITAL | Age: 73
End: 2018-08-20

## 2018-08-20 ENCOUNTER — APPOINTMENT (OUTPATIENT)
Dept: LAB | Facility: MEDICAL CENTER | Age: 73
End: 2018-08-20
Payer: MEDICARE

## 2018-08-20 VITALS
BODY MASS INDEX: 35.55 KG/M2 | SYSTOLIC BLOOD PRESSURE: 120 MMHG | DIASTOLIC BLOOD PRESSURE: 70 MMHG | WEIGHT: 240 LBS | HEIGHT: 69 IN

## 2018-08-20 DIAGNOSIS — R35.1 NOCTURIA: ICD-10-CM

## 2018-08-20 DIAGNOSIS — E13.8 DIABETES MELLITUS OF OTHER TYPE WITH COMPLICATION, UNSPECIFIED WHETHER LONG TERM INSULIN USE: Primary | ICD-10-CM

## 2018-08-20 DIAGNOSIS — R30.0 DYSURIA: ICD-10-CM

## 2018-08-20 DIAGNOSIS — C61 PROSTATE CANCER (HCC): ICD-10-CM

## 2018-08-20 DIAGNOSIS — I10 ESSENTIAL HYPERTENSION, MALIGNANT: ICD-10-CM

## 2018-08-20 DIAGNOSIS — R35.0 INCREASED URINARY FREQUENCY: Primary | ICD-10-CM

## 2018-08-20 DIAGNOSIS — E13.8 DIABETES MELLITUS OF OTHER TYPE WITH COMPLICATION, UNSPECIFIED WHETHER LONG TERM INSULIN USE: ICD-10-CM

## 2018-08-20 DIAGNOSIS — R39.89 BLADDER PAIN: ICD-10-CM

## 2018-08-20 LAB
ALBUMIN SERPL BCP-MCNC: 4 G/DL (ref 3.5–5)
ALP SERPL-CCNC: 72 U/L (ref 46–116)
ALT SERPL W P-5'-P-CCNC: 29 U/L (ref 12–78)
ANION GAP SERPL CALCULATED.3IONS-SCNC: 9 MMOL/L (ref 4–13)
AST SERPL W P-5'-P-CCNC: 22 U/L (ref 5–45)
BILIRUB SERPL-MCNC: 0.85 MG/DL (ref 0.2–1)
BUN SERPL-MCNC: 14 MG/DL (ref 5–25)
CALCIUM SERPL-MCNC: 9.2 MG/DL (ref 8.3–10.1)
CHLORIDE SERPL-SCNC: 100 MMOL/L (ref 100–108)
CO2 SERPL-SCNC: 23 MMOL/L (ref 21–32)
CREAT SERPL-MCNC: 1.05 MG/DL (ref 0.6–1.3)
EST. AVERAGE GLUCOSE BLD GHB EST-MCNC: 169 MG/DL
GFR SERPL CREATININE-BSD FRML MDRD: 70 ML/MIN/1.73SQ M
GLUCOSE P FAST SERPL-MCNC: 201 MG/DL (ref 65–99)
HBA1C MFR BLD: 7.5 % (ref 4.2–6.3)
POTASSIUM SERPL-SCNC: 4.4 MMOL/L (ref 3.5–5.3)
PROT SERPL-MCNC: 7.5 G/DL (ref 6.4–8.2)
PSA SERPL-MCNC: 3.1 NG/ML (ref 0–4)
SL AMB  POCT GLUCOSE, UA: NEGATIVE
SL AMB LEUKOCYTE ESTERASE,UA: NEGATIVE
SL AMB POCT BILIRUBIN,UA: NEGATIVE
SL AMB POCT BLOOD,UA: NEGATIVE
SL AMB POCT CLARITY,UA: CLEAR
SL AMB POCT COLOR,UA: YELLOW
SL AMB POCT KETONES,UA: NEGATIVE
SL AMB POCT NITRITE,UA: NEGATIVE
SL AMB POCT PH,UA: 6
SL AMB POCT SPECIFIC GRAVITY,UA: 1.01
SL AMB POCT URINE PROTEIN: NEGATIVE
SL AMB POCT UROBILINOGEN: 0.2
SODIUM SERPL-SCNC: 132 MMOL/L (ref 136–145)
TSH SERPL DL<=0.05 MIU/L-ACNC: 1.41 UIU/ML (ref 0.36–3.74)

## 2018-08-20 PROCEDURE — 81003 URINALYSIS AUTO W/O SCOPE: CPT | Performed by: UROLOGY

## 2018-08-20 PROCEDURE — 83036 HEMOGLOBIN GLYCOSYLATED A1C: CPT

## 2018-08-20 PROCEDURE — 84153 ASSAY OF PSA TOTAL: CPT

## 2018-08-20 PROCEDURE — 84443 ASSAY THYROID STIM HORMONE: CPT

## 2018-08-20 PROCEDURE — 36415 COLL VENOUS BLD VENIPUNCTURE: CPT

## 2018-08-20 PROCEDURE — 87086 URINE CULTURE/COLONY COUNT: CPT

## 2018-08-20 PROCEDURE — 99214 OFFICE O/P EST MOD 30 MIN: CPT | Performed by: UROLOGY

## 2018-08-20 PROCEDURE — 80053 COMPREHEN METABOLIC PANEL: CPT

## 2018-08-20 NOTE — PROGRESS NOTES
[address] [facility]  [facility]  [address] [facility] [address]  [place], [address] [facility]  [phone]  www  [facility]  org      NAME: Neo Hunt  AGE: 75 y o  SEX: male  : 1945   MRN: [id number]    DATE: 2018  TIME: 10:13 AM    Assessment and Plan:  Prostate cancer:  Check PSA today  Increasing lower urinary tract symptoms including dysuria:  Send urine cytology and perform cystoscopy and bilateral retrograde pyelography under general anesthesia or IV sedation  We will schedule this  The risks of bleeding infection explained  He gives informed consent  Chief Complaint   No chief complaint on file  History of Present Illness   Prostate cancer: On androgen deprivation therapy window  PSA 0 82 2018  He is due to get a PSA done today  In the past week he has developed increasing nocturia and it is now reached the level of every hour  He is still taking the Ditropan XL 5 mg at bedtime but obviously this is not working  He has dysuria for the past 2 weeks  However come further questioning the symptoms have been getting worse for at least 6 months  CT scan 2018 showed only a 3 mm stone in his right kidney  He is otherwise stone free  The following portions of the patient's history were reviewed and updated as appropriate: allergies, current medications, past family history, past medical history, past social history, past surgical history and problem list     Review of Systems   Review of Systems   Respiratory: Negative  Cardiovascular: Negative  Gastrointestinal: Positive for constipation  Genitourinary: Positive for dysuria, frequency and urgency  Musculoskeletal: Positive for back pain  Active Problem List     Patient Active Problem List   Diagnosis    Malignant neoplasm of prostate ([address] )    Arthritis       Objective   There were no vitals taken for this visit      Physical Exam   Constitutional: He is oriented to person, place, and time  He appears well-developed and well-nourished  HENT:   Head: Normocephalic and atraumatic  Eyes: EOM are normal    Neck: Normal range of motion  Neck supple  No thyromegaly present  Cardiovascular: Normal rate, regular rhythm and normal heart sounds  Pulmonary/Chest: Effort normal and breath sounds normal  No respiratory distress  He has no wheezes  Abdominal: Soft  He exhibits no distension  There is no tenderness  There is no rebound and no guarding  Musculoskeletal: Normal range of motion  Neurological: He is alert and oriented to person, place, and time  Skin: Skin is warm and dry  Psychiatric: He has a normal mood and affect  His behavior is normal  Judgment and thought content normal            Current Medications     Current Outpatient Prescriptions:     allopurinol (ZYLOPRIM) 100 mg tablet, Take 1 tablet (100 mg total) by mouth daily, Disp: 30 tablet, Rfl: 0    amitriptyline (ELAVIL) 10 mg tablet, Take by mouth, Disp: , Rfl:     aspirin 81 MG tablet, Take 81 mg by mouth, Disp: , Rfl:     azelastine (ASTELIN) 0 1 % nasal spray, 2 sprays into each nostril 3 (three) times a day Use in each nostril as directed  , Disp: , Rfl:     baclofen 10 mg tablet, baclofen 10 mg tablet, Disp: , Rfl:     bicalutamide (CASODEX) 50 mg tablet, Take 1 daily, Disp: , Rfl:     Buprenorphine (BUTRANS) 15 MCG/HR PTWK, Apply patch for 1 week at a time  , Disp: , Rfl:     celecoxib (CeleBREX) 200 mg capsule, , Disp: , Rfl:     chlorhexidine (PERIDEX) 0 12 % solution, , Disp: , Rfl:     Cholecalciferol 1000 units capsule, Take 1,000 Units by mouth, Disp: , Rfl:     clonazePAM (KlonoPIN) 0 5 mg tablet, Take 0 5 mg by mouth 3 (three) times a day, Disp: , Rfl:     diclofenac sodium (VOLTAREN) 1 %, Apply 2mg 2 times every day, Disp: , Rfl:     DULoxetine (CYMBALTA) 60 mg delayed release capsule, Take 1 capsule (60mg) by mouth once daily  , Disp: , Rfl:     escitalopram (LEXAPRO) 20 mg tablet, Take 5 mg by mouth daily, Disp: , Rfl:     Exenatide (BYETTA 10 MCG PEN) 10 MCG/0 04ML SOPN, Byetta 10 mcg/dose(250 mcg/mL)2 4 mL subcutaneous pen injector, Disp: , Rfl:     Fexofenadine HCl (MUCINEX ALLERGY PO), Take 1-2 tablets by mouth as needed  , Disp: , Rfl:     glimepiride (AMARYL) 2 mg tablet, Take 2 mg by mouth every morning before breakfast, Disp: , Rfl:     guaiFENesin (MUCINEX) 600 mg 12 hr tablet, As Needed if sinuses are cogged once daily  PRN, Disp: , Rfl:     Hydrocodone-Acetaminophen (VICODIN PO), Take by mouth daily, Disp: , Rfl:     Ibuprofen (ADVIL) 200 MG CAPS, As Needed  PRN, Disp: , Rfl:     Ibuprofen (MOTRIN PO), Take by mouth as needed, Disp: , Rfl:     lidocaine (LIDODERM) 5 %, Place 1 patch on the skin daily Remove & Discard patch within 12 hours or as directed by MD, Disp: 30 patch, Rfl: 0    lidocaine (LIDODERM) 5 %, Place 1 patch on the skin daily Remove & Discard patch within 12 hours or as directed by MD, Disp: 30 patch, Rfl: 0    Liraglutide (VICTOZA SC), Inject under the skin daily, Disp: , Rfl:     LORazepam (ATIVAN) 0 5 mg tablet, as needed, Disp: , Rfl:     metFORMIN (GLUCOPHAGE) 500 mg tablet, Take 500 mg by mouth 3 (three) times a day with meals  , Disp: , Rfl:     montelukast (SINGULAIR) 10 mg tablet, Daily, Disp: , Rfl:     Multiple Vitamins-Minerals (MULTIVITAMIN ADULT PO), Take by mouth, Disp: , Rfl:     Omeprazole 20 MG TBEC, Daily, Disp: , Rfl:     oxybutynin (DITROPAN-XL) 5 mg 24 hr tablet, Take 1 tablet (5 mg total) by mouth daily, Disp: 90 tablet, Rfl: 0    SIMVASTATIN PO, Take 20 mg by mouth daily  , Disp: , Rfl:     TRAMADOL HCL PO, Take by mouth as needed, Disp: , Rfl:     zolpidem (AMBIEN) 10 mg tablet, Take by mouth daily at bedtime, Disp: , Rfl:         Herrera Chowdhury MD

## 2018-08-20 NOTE — H&P
[address] [facility]  [facility]  [address] [facility]  [place], [address] [place] [address]  [phone]  www  [facility]  org      NAME: Avtar Sampson  AGE: 75 y o  SEX: male  : 1945   MRN: [id number]    DATE: 2018  TIME: 10:13 AM    Assessment and Plan:  Prostate cancer:  Check PSA today  Increasing lower urinary tract symptoms including dysuria:  Send urine cytology and perform cystoscopy and bilateral retrograde pyelography under general anesthesia or IV sedation  We will schedule this  The risks of bleeding infection explained  He gives informed consent  Chief Complaint   No chief complaint on file  History of Present Illness   Prostate cancer: On androgen deprivation therapy window  PSA 0 82 2018  He is due to get a PSA done today  In the past week he has developed increasing nocturia and it is now reached the level of every hour  He is still taking the Ditropan XL 5 mg at bedtime but obviously this is not working  He has dysuria for the past 2 weeks  However come further questioning the symptoms have been getting worse for at least 6 months  CT scan 2018 showed only a 3 mm stone in his right kidney  He is otherwise stone free  The following portions of the patient's history were reviewed and updated as appropriate: allergies, current medications, past family history, past medical history, past social history, past surgical history and problem list     Review of Systems   Review of Systems   Respiratory: Negative  Cardiovascular: Negative  Gastrointestinal: Positive for constipation  Genitourinary: Positive for dysuria, frequency and urgency  Musculoskeletal: Positive for back pain  Active Problem List     Patient Active Problem List   Diagnosis    Malignant neoplasm of prostate ([address] )    Arthritis       Objective   There were no vitals taken for this visit      Physical Exam   Constitutional: He is oriented to person, place, and time  He appears well-developed and well-nourished  HENT:   Head: Normocephalic and atraumatic  Eyes: EOM are normal    Neck: Normal range of motion  Neck supple  No thyromegaly present  Cardiovascular: Normal rate, regular rhythm and normal heart sounds  Pulmonary/Chest: Effort normal and breath sounds normal  No respiratory distress  He has no wheezes  Abdominal: Soft  He exhibits no distension  There is no tenderness  There is no rebound and no guarding  Musculoskeletal: Normal range of motion  Neurological: He is alert and oriented to person, place, and time  Skin: Skin is warm and dry  Psychiatric: He has a normal mood and affect  His behavior is normal  Judgment and thought content normal            Current Medications     Current Outpatient Prescriptions:     allopurinol (ZYLOPRIM) 100 mg tablet, Take 1 tablet (100 mg total) by mouth daily, Disp: 30 tablet, Rfl: 0    amitriptyline (ELAVIL) 10 mg tablet, Take by mouth, Disp: , Rfl:     aspirin 81 MG tablet, Take 81 mg by mouth, Disp: , Rfl:     azelastine (ASTELIN) 0 1 % nasal spray, 2 sprays into each nostril 3 (three) times a day Use in each nostril as directed  , Disp: , Rfl:     baclofen 10 mg tablet, baclofen 10 mg tablet, Disp: , Rfl:     bicalutamide (CASODEX) 50 mg tablet, Take 1 daily, Disp: , Rfl:     Buprenorphine (BUTRANS) 15 MCG/HR PTWK, Apply patch for 1 week at a time  , Disp: , Rfl:     celecoxib (CeleBREX) 200 mg capsule, , Disp: , Rfl:     chlorhexidine (PERIDEX) 0 12 % solution, , Disp: , Rfl:     Cholecalciferol 1000 units capsule, Take 1,000 Units by mouth, Disp: , Rfl:     clonazePAM (KlonoPIN) 0 5 mg tablet, Take 0 5 mg by mouth 3 (three) times a day, Disp: , Rfl:     diclofenac sodium (VOLTAREN) 1 %, Apply 2mg 2 times every day, Disp: , Rfl:     DULoxetine (CYMBALTA) 60 mg delayed release capsule, Take 1 capsule (60mg) by mouth once daily  , Disp: , Rfl:     escitalopram (LEXAPRO) 20 mg tablet, Take 5 mg by mouth daily, Disp: , Rfl:     Exenatide (BYETTA 10 MCG PEN) 10 MCG/0 04ML SOPN, Byetta 10 mcg/dose(250 mcg/mL)2 4 mL subcutaneous pen injector, Disp: , Rfl:     Fexofenadine HCl (MUCINEX ALLERGY PO), Take 1-2 tablets by mouth as needed  , Disp: , Rfl:     glimepiride (AMARYL) 2 mg tablet, Take 2 mg by mouth every morning before breakfast, Disp: , Rfl:     guaiFENesin (MUCINEX) 600 mg 12 hr tablet, As Needed if sinuses are cogged once daily  PRN, Disp: , Rfl:     Hydrocodone-Acetaminophen (VICODIN PO), Take by mouth daily, Disp: , Rfl:     Ibuprofen (ADVIL) 200 MG CAPS, As Needed  PRN, Disp: , Rfl:     Ibuprofen (MOTRIN PO), Take by mouth as needed, Disp: , Rfl:     lidocaine (LIDODERM) 5 %, Place 1 patch on the skin daily Remove & Discard patch within 12 hours or as directed by MD, Disp: 30 patch, Rfl: 0    lidocaine (LIDODERM) 5 %, Place 1 patch on the skin daily Remove & Discard patch within 12 hours or as directed by MD, Disp: 30 patch, Rfl: 0    Liraglutide (VICTOZA SC), Inject under the skin daily, Disp: , Rfl:     LORazepam (ATIVAN) 0 5 mg tablet, as needed, Disp: , Rfl:     metFORMIN (GLUCOPHAGE) 500 mg tablet, Take 500 mg by mouth 3 (three) times a day with meals  , Disp: , Rfl:     montelukast (SINGULAIR) 10 mg tablet, Daily, Disp: , Rfl:     Multiple Vitamins-Minerals (MULTIVITAMIN ADULT PO), Take by mouth, Disp: , Rfl:     Omeprazole 20 MG TBEC, Daily, Disp: , Rfl:     oxybutynin (DITROPAN-XL) 5 mg 24 hr tablet, Take 1 tablet (5 mg total) by mouth daily, Disp: 90 tablet, Rfl: 0    SIMVASTATIN PO, Take 20 mg by mouth daily  , Disp: , Rfl:     TRAMADOL HCL PO, Take by mouth as needed, Disp: , Rfl:     zolpidem (AMBIEN) 10 mg tablet, Take by mouth daily at bedtime, Disp: , Rfl:         Isaias Felipe MD

## 2018-08-20 NOTE — LETTER
2018     Javier Hines MD  [address] [place] [address]    Patient: Mauricio Lewis   YOB: 1945   Date of Visit: 2018       Dear Dr Riccardo Pond:    Thank you for referring Mauricio Lewis to me for evaluation  Below are my notes for this consultation  If you have questions, please do not hesitate to call me  I look forward to following your patient along with you  Sincerely,        Ana Alcala MD        CC: No Recipients  Ana Alcala MD  2018 10:46 AM  Sign at close encounter  [address] [facility]  [place] [phone]  www  [facility]  org      NAME: Mauricio Lewis  AGE: 75 y o  SEX: male  : 1945   MRN: [id number]    DATE: 2018  TIME: 10:13 AM    Assessment and Plan:  Prostate cancer:  Check PSA today  Increasing lower urinary tract symptoms including dysuria:  Send urine cytology and perform cystoscopy and bilateral retrograde pyelography under general anesthesia or IV sedation  We will schedule this  The risks of bleeding infection explained  He gives informed consent  Chief Complaint   No chief complaint on file  History of Present Illness   Prostate cancer: On androgen deprivation therapy window  PSA 0 82 2018  He is due to get a PSA done today  In the past week he has developed increasing nocturia and it is now reached the level of every hour  He is still taking the Ditropan XL 5 mg at bedtime but obviously this is not working  He has dysuria for the past 2 weeks  However come further questioning the symptoms have been getting worse for at least 6 months  CT scan 2018 showed only a 3 mm stone in his right kidney  He is otherwise stone free        The following portions of the patient's history were reviewed and updated as appropriate: allergies, current medications, past family history, past medical history, past social history, past surgical history and problem list     Review of Systems   Review of Systems   Respiratory: Negative  Cardiovascular: Negative  Gastrointestinal: Positive for constipation  Genitourinary: Positive for dysuria, frequency and urgency  Musculoskeletal: Positive for back pain  Active Problem List     Patient Active Problem List   Diagnosis    Malignant neoplasm of [address] ([facility] [address] )    Arthritis       Objective   There were no vitals taken for this visit  Physical Exam   Constitutional: He is oriented to person, place, and time  He appears well-developed and well-nourished  HENT:   Head: Normocephalic and atraumatic  Eyes: EOM are normal    Neck: Normal range of motion  Neck supple  No thyromegaly present  Cardiovascular: Normal rate, regular rhythm and normal heart sounds  Pulmonary/Chest: Effort normal and breath sounds normal  No respiratory distress  He has no wheezes  Abdominal: Soft  He exhibits no distension  There is no tenderness  There is no rebound and no guarding  Musculoskeletal: Normal range of motion  Neurological: He is alert and oriented to person, place, and time  Skin: Skin is warm and dry  Psychiatric: He has a normal mood and affect   His behavior is normal  Judgment and thought content normal            Current Medications     Current Outpatient Prescriptions:     allopurinol (ZYLOPRIM) 100 mg tablet, Take 1 tablet (100 mg total) by mouth daily, Disp: 30 tablet, Rfl: 0    amitriptyline (ELAVIL) 10 mg tablet, Take by mouth, Disp: , Rfl:     aspirin 81 MG tablet, Take 81 mg by mouth, Disp: , Rfl:     azelastine (ASTELIN) 0 1 % nasal spray, 2 sprays into each nostril 3 (three) times a day Use in each nostril as directed  , Disp: , Rfl:     baclofen 10 mg tablet, baclofen 10 mg tablet, Disp: , Rfl:     bicalutamide (CASODEX) 50 mg tablet, Take 1 daily, Disp: , Rfl:     Buprenorphine (BUTRANS) 15 MCG/HR PTWK, Apply patch for 1 week at a time  , Disp: , Rfl:     celecoxib (CeleBREX) 200 mg capsule, , Disp: , Rfl:     chlorhexidine (PERIDEX) 0 12 % solution, , Disp: , Rfl:     Cholecalciferol 1000 units capsule, Take 1,000 Units by mouth, Disp: , Rfl:     clonazePAM (KlonoPIN) 0 5 mg tablet, Take 0 5 mg by mouth 3 (three) times a day, Disp: , Rfl:     diclofenac sodium (VOLTAREN) 1 %, Apply 2mg 2 times every day, Disp: , Rfl:     DULoxetine (CYMBALTA) 60 mg delayed release capsule, Take 1 capsule (60mg) by mouth once daily  , Disp: , Rfl:     escitalopram (LEXAPRO) 20 mg tablet, Take 5 mg by mouth daily, Disp: , Rfl:     Exenatide (BYETTA 10 MCG PEN) 10 MCG/0 04ML SOPN, Byetta 10 mcg/dose(250 mcg/mL)2 4 mL subcutaneous pen injector, Disp: , Rfl:     Fexofenadine HCl (MUCINEX ALLERGY PO), Take 1-2 tablets by mouth as needed  , Disp: , Rfl:     glimepiride (AMARYL) 2 mg tablet, Take 2 mg by mouth every morning before breakfast, Disp: , Rfl:     guaiFENesin (MUCINEX) 600 mg 12 hr tablet, As Needed if sinuses are cogged once daily  PRN, Disp: , Rfl:     Hydrocodone-Acetaminophen (VICODIN PO), Take by mouth daily, Disp: , Rfl:     Ibuprofen (ADVIL) 200 MG CAPS, As Needed  PRN, Disp: , Rfl:     Ibuprofen (MOTRIN PO), Take by mouth as needed, Disp: , Rfl:     lidocaine (LIDODERM) 5 %, Place 1 patch on the skin daily Remove & Discard patch within 12 hours or as directed by MD, Disp: 30 patch, Rfl: 0    lidocaine (LIDODERM) 5 %, Place 1 patch on the skin daily Remove & Discard patch within 12 hours or as directed by MD, Disp: 30 patch, Rfl: 0    Liraglutide (VICTOZA SC), Inject under the skin daily, Disp: , Rfl:     LORazepam (ATIVAN) 0 5 mg tablet, as needed, Disp: , Rfl:     metFORMIN (GLUCOPHAGE) 500 mg tablet, Take 500 mg by mouth 3 (three) times a day with meals  , Disp: , Rfl:     montelukast (SINGULAIR) 10 mg tablet, Daily, Disp: , Rfl:     Multiple Vitamins-Minerals (MULTIVITAMIN ADULT PO), Take by mouth, Disp: , Rfl:     Omeprazole 20 MG TBEC, Daily, Disp: , Rfl:     oxybutynin (DITROPAN-XL) 5 mg 24 hr tablet, Take 1 tablet (5 mg total) by mouth daily, Disp: 90 tablet, Rfl: 0    SIMVASTATIN PO, Take 20 mg by mouth daily  , Disp: , Rfl:     TRAMADOL HCL PO, Take by mouth as needed, Disp: , Rfl:     zolpidem (AMBIEN) 10 mg tablet, Take by mouth daily at bedtime, Disp: , Rfl:         Unique Hooks MD

## 2018-08-21 LAB — BACTERIA UR CULT: NORMAL

## 2018-08-28 ENCOUNTER — ANESTHESIA EVENT (OUTPATIENT)
Dept: PERIOP | Facility: HOSPITAL | Age: 73
End: 2018-08-28
Payer: MEDICARE

## 2018-08-28 NOTE — PRE-PROCEDURE INSTRUCTIONS
Pre-Surgery Instructions:   Medication Instructions    amitriptyline (ELAVIL) 10 mg tablet Instructed patient per Anesthesia Guidelines   aspirin 81 MG tablet Instructed patient per Anesthesia Guidelines   azelastine (ASTELIN) 0 1 % nasal spray Instructed patient per Anesthesia Guidelines   Buprenorphine (BUTRANS) 15 MCG/HR [facility] Instructed patient per Anesthesia Guidelines   celecoxib (CeleBREX) 200 mg capsule Instructed patient per Anesthesia Guidelines   clonazePAM (KlonoPIN) 0 5 mg tablet Instructed patient per Anesthesia Guidelines   diclofenac sodium (VOLTAREN) 1 % Instructed patient per Anesthesia Guidelines   DULoxetine (CYMBALTA) 60 mg delayed release capsule Instructed patient per Anesthesia Guidelines   Exenatide (BYETTA 10 MCG PEN) 10 MCG/0 04ML SOPN Instructed patient per Anesthesia Guidelines   Fexofenadine HCl (MUCINEX ALLERGY PO) Instructed patient per Anesthesia Guidelines   glimepiride (AMARYL) 2 mg tablet Instructed patient per Anesthesia Guidelines   guaiFENesin (MUCINEX) 600 mg 12 hr tablet Instructed patient per Anesthesia Guidelines   Hydrocodone-Acetaminophen (VICODIN PO) Instructed patient per Anesthesia Guidelines   hydrocortisone (WESTCORT) 0 2 % cream Instructed patient per Anesthesia Guidelines   Ibuprofen (ADVIL) 200 MG CAPS Instructed patient per Anesthesia Guidelines   Ibuprofen (MOTRIN PO) Instructed patient per Anesthesia Guidelines   lidocaine (LIDODERM) 5 % Instructed patient per Anesthesia Guidelines   Liraglutide (VICTOZA SC) Instructed patient per Anesthesia Guidelines   metFORMIN (GLUCOPHAGE) 500 mg tablet Instructed patient per Anesthesia Guidelines   montelukast (SINGULAIR) 10 mg tablet Instructed patient per Anesthesia Guidelines   Multiple Vitamins-Minerals (MULTIVITAMIN ADULT PO) Instructed patient per Anesthesia Guidelines   mupirocin (BACTROBAN) 2 % ointment Instructed patient per Anesthesia Guidelines      Omeprazole 20 MG TBEC Instructed patient per Anesthesia Guidelines   oxybutynin (DITROPAN-XL) 5 mg 24 hr tablet Instructed patient per Anesthesia Guidelines   SIMVASTATIN PO Instructed patient per Anesthesia Guidelines   TRAMADOL HCL PO Instructed patient per Anesthesia Guidelines   zolpidem (AMBIEN) 10 mg tablet Instructed patient per Anesthesia Guidelines   [DISCONTINUED] allopurinol (ZYLOPRIM) 100 mg tablet Instructed patient per Anesthesia Guidelines   [DISCONTINUED] escitalopram (LEXAPRO) 20 mg tablet Instructed patient per Anesthesia Guidelines   [DISCONTINUED] lidocaine (LIDODERM) 5 % Instructed patient per Anesthesia Guidelines   [DISCONTINUED] LORazepam (ATIVAN) 0 5 mg tablet Instructed patient per Anesthesia Guidelines  Spoke to patient via TC Pre operative instructions given  Medication per anesthesia guidelines  Holding ASA 81mg day of procedure last dose today 8/28/2018

## 2018-08-29 ENCOUNTER — HOSPITAL ENCOUNTER (OUTPATIENT)
Dept: RADIOLOGY | Facility: HOSPITAL | Age: 73
Setting detail: OUTPATIENT SURGERY
Discharge: HOME | End: 2018-08-29
Payer: MEDICARE

## 2018-08-29 ENCOUNTER — HOSPITAL ENCOUNTER (OUTPATIENT)
Facility: HOSPITAL | Age: 73
Setting detail: OUTPATIENT SURGERY
Discharge: HOME | End: 2018-08-29
Attending: UROLOGY | Admitting: UROLOGY
Payer: MEDICARE

## 2018-08-29 ENCOUNTER — ANESTHESIA (OUTPATIENT)
Dept: PERIOP | Facility: HOSPITAL | Age: 73
End: 2018-08-29
Payer: MEDICARE

## 2018-08-29 VITALS
HEIGHT: 69 IN | SYSTOLIC BLOOD PRESSURE: 142 MMHG | HEART RATE: 62 BPM | TEMPERATURE: 97.7 F | DIASTOLIC BLOOD PRESSURE: 66 MMHG | OXYGEN SATURATION: 97 % | BODY MASS INDEX: 35.55 KG/M2 | WEIGHT: 240 LBS | RESPIRATION RATE: 18 BRPM

## 2018-08-29 DIAGNOSIS — R30.0 DYSURIA: ICD-10-CM

## 2018-08-29 LAB
GLUCOSE SERPL-MCNC: 144 MG/DL (ref 65–140)
GLUCOSE SERPL-MCNC: 164 MG/DL (ref 65–140)

## 2018-08-29 PROCEDURE — 52005 CYSTO W/URTRL CATHJ: CPT | Performed by: UROLOGY

## 2018-08-29 PROCEDURE — 82948 REAGENT STRIP/BLOOD GLUCOSE: CPT

## 2018-08-29 PROCEDURE — C1769 GUIDE WIRE: HCPCS | Performed by: UROLOGY

## 2018-08-29 PROCEDURE — 74450 X-RAY URETHRA/BLADDER: CPT

## 2018-08-29 RX ORDER — MAGNESIUM HYDROXIDE 1200 MG/15ML
LIQUID ORAL AS NEEDED
Status: DISCONTINUED | OUTPATIENT
Start: 2018-08-29 | End: 2018-08-29 | Stop reason: HOSPADM

## 2018-08-29 RX ORDER — SODIUM CHLORIDE 9 MG/ML
125 INJECTION, SOLUTION INTRAVENOUS CONTINUOUS
Status: DISCONTINUED | OUTPATIENT
Start: 2018-08-29 | End: 2018-08-29 | Stop reason: HOSPADM

## 2018-08-29 RX ORDER — HYDROCODONE BITARTRATE AND ACETAMINOPHEN 5; 325 MG/1; MG/1
1 TABLET ORAL EVERY 6 HOURS PRN
Status: DISCONTINUED | OUTPATIENT
Start: 2018-08-29 | End: 2018-08-29 | Stop reason: HOSPADM

## 2018-08-29 RX ORDER — FENTANYL CITRATE 50 UG/ML
INJECTION, SOLUTION INTRAMUSCULAR; INTRAVENOUS AS NEEDED
Status: DISCONTINUED | OUTPATIENT
Start: 2018-08-29 | End: 2018-08-29 | Stop reason: SURG

## 2018-08-29 RX ORDER — CEFAZOLIN SODIUM 1 G/3ML
INJECTION, POWDER, FOR SOLUTION INTRAMUSCULAR; INTRAVENOUS AS NEEDED
Status: DISCONTINUED | OUTPATIENT
Start: 2018-08-29 | End: 2018-08-29 | Stop reason: SURG

## 2018-08-29 RX ORDER — PROPOFOL 10 MG/ML
INJECTION, EMULSION INTRAVENOUS AS NEEDED
Status: DISCONTINUED | OUTPATIENT
Start: 2018-08-29 | End: 2018-08-29 | Stop reason: SURG

## 2018-08-29 RX ORDER — ONDANSETRON 2 MG/ML
INJECTION INTRAMUSCULAR; INTRAVENOUS AS NEEDED
Status: DISCONTINUED | OUTPATIENT
Start: 2018-08-29 | End: 2018-08-29 | Stop reason: SURG

## 2018-08-29 RX ORDER — PHENAZOPYRIDINE HYDROCHLORIDE 100 MG/1
100 TABLET, FILM COATED ORAL
Status: DISCONTINUED | OUTPATIENT
Start: 2018-08-29 | End: 2018-08-29 | Stop reason: HOSPADM

## 2018-08-29 RX ORDER — LIDOCAINE HYDROCHLORIDE 10 MG/ML
INJECTION, SOLUTION INFILTRATION; PERINEURAL AS NEEDED
Status: DISCONTINUED | OUTPATIENT
Start: 2018-08-29 | End: 2018-08-29 | Stop reason: SURG

## 2018-08-29 RX ORDER — FENTANYL CITRATE/PF 50 MCG/ML
50 SYRINGE (ML) INJECTION
Status: DISCONTINUED | OUTPATIENT
Start: 2018-08-29 | End: 2018-08-29 | Stop reason: HOSPADM

## 2018-08-29 RX ORDER — ONDANSETRON 2 MG/ML
4 INJECTION INTRAMUSCULAR; INTRAVENOUS ONCE AS NEEDED
Status: DISCONTINUED | OUTPATIENT
Start: 2018-08-29 | End: 2018-08-29 | Stop reason: HOSPADM

## 2018-08-29 RX ADMIN — PHENAZOPYRIDINE HYDROCHLORIDE 100 MG: 100 TABLET ORAL at 11:35

## 2018-08-29 RX ADMIN — SODIUM CHLORIDE: 0.9 INJECTION, SOLUTION INTRAVENOUS at 10:11

## 2018-08-29 RX ADMIN — ONDANSETRON HYDROCHLORIDE 4 MG: 2 INJECTION, SOLUTION INTRAVENOUS at 10:09

## 2018-08-29 RX ADMIN — SODIUM CHLORIDE 125 ML/HR: 0.9 INJECTION, SOLUTION INTRAVENOUS at 07:40

## 2018-08-29 RX ADMIN — DEXAMETHASONE SODIUM PHOSPHATE 4 MG: 10 INJECTION INTRAMUSCULAR; INTRAVENOUS at 10:09

## 2018-08-29 RX ADMIN — PROPOFOL 160 MG: 10 INJECTION, EMULSION INTRAVENOUS at 09:47

## 2018-08-29 RX ADMIN — FENTANYL CITRATE 25 MCG: 50 INJECTION, SOLUTION INTRAMUSCULAR; INTRAVENOUS at 09:55

## 2018-08-29 RX ADMIN — HYDROCODONE BITARTRATE AND ACETAMINOPHEN 1 TABLET: 5; 325 TABLET ORAL at 11:37

## 2018-08-29 RX ADMIN — LIDOCAINE HYDROCHLORIDE 50 MG: 10 INJECTION, SOLUTION INFILTRATION; PERINEURAL at 09:47

## 2018-08-29 RX ADMIN — CEFAZOLIN 2000 MG: 1 INJECTION, POWDER, FOR SOLUTION INTRAVENOUS at 09:51

## 2018-08-29 NOTE — DISCHARGE INSTRUCTIONS
Expect to see blood in the urine, and have burning urgency and frequency  Call for fever greater than 101 5° or severe pain, or inability urinate  you may resume a regular diet and activities today except for driving  Resume usual medication    There are no prescriptions to be picked up, as you were given intravenous antibiotics in the hospital

## 2018-08-29 NOTE — ANESTHESIA PREPROCEDURE EVALUATION
Review of Systems/Medical History  Patient summary reviewed  Chart reviewed      Cardiovascular  Exercise tolerance (METS): <4,  Hyperlipidemia,    Pulmonary  Sleep apnea CPAP,        GI/Hepatic    GERD well controlled,        Kidney stones,        Endo/Other  Diabetes poorly controlled type 2 Oral agent,   Obesity  morbid obesity   GYN  Negative gynecology ROS          Hematology  Negative hematology ROS      Musculoskeletal    Arthritis     Neurology  Negative neurology ROS      Psychology   Negative psychology ROS              Physical Exam    Airway    Mallampati score: III  TM Distance: <3 FB  Neck ROM: full     Dental       Cardiovascular  Rhythm: regular, Rate: normal,     Pulmonary  Breath sounds clear to auscultation,     Other Findings        Anesthesia Plan  ASA Score- 3     Anesthesia Type- general with ASA Monitors  Additional Monitors:   Airway Plan:         Plan Factors- Patient instructed to abstain from smoking on day of procedure  Patient did not smoke on day of surgery  Induction- intravenous  Postoperative Plan-     Informed Consent- Anesthetic plan and risks discussed with patient

## 2018-08-29 NOTE — OP NOTE
OPERATIVE REPORT  PATIENT NAME: Tami Muir    :  1945  MRN: [id number]  Pt Location: [facility] OR ROOM 04    SURGERY DATE: 2018    Surgeon(s) and Role:     Kimi Whitten MD - Primary    Preop Diagnosis:  Dysuria [R30 0]  Bladder pain [R39 89]  Prostate cancer ([address] ) Cami Furry  Increased urinary frequency [R35 0]    Post-Op Diagnosis Codes: * Dysuria [R30 0]     * Bladder pain [R39 89]     * Prostate cancer (HCC) [C61]     * Increased urinary frequency [R35 0]    Procedure(s) (LRB):  CYSTOSCOPY,  b/l retrogrades (Bilateral)    Specimen(s):  None    Estimated Blood Loss:   Minimal    Drains:  None       Anesthesia Type:   IV Sedation with Anesthesia    Operative Indications:  Dysuria [R30 0]  Bladder pain [R39 89]  Prostate cancer ([address] ) Cami Furry  Increased urinary frequency [R35 0]      Operative Findings: Moderate BPH with high bladder neck  Otherwise normal bladder with slightly stenotic left ureteral orifice  Complications:   None    Procedure and Technique:  Patient is a 79-year-old man with a history of prostate cancer and microscopic hematuria and dysuria  He cannot tolerate cystoscopy in the office so I offered him cystoscopy and bilateral retrograde pyelography in the operating room  The risks of bleeding infection damage to the urinary tract were explained and he gives informed consent  Patient was brought to the operating room identified properly  LMA was induced the patient was prepped and draped in dorsal lithotomy position in the usual fashion  A time-out was performed and cystoscopy was carried out with a 22 [place] cystoscopy sheath and 30 and 70 degree lenses  The urethra was normal until I reached the membranous urethra and there was a slight web-like stricture  This was easily broken through with the cysto sheath  The prostate showed moderate obstruction and there was a high bladder neck  the bladder was smooth, not trabeculated there are no stones tumors or other lesions    The orifices were orthotopic and intact  Using a tiger tail catheter, I injected 50% Conray up each ureter and with fluoroscopic guidance was able to visualize the entire urinary tract  There are no filling defects and no strictures  The left ureteral orifice was slightly stenotic and I had to place a 0 035 inch guidewire into it to get the tiger tail catheter into it  All images were saved to PACS, and the bladder was drained     I was present for the entire procedure and A qualified resident physician was not available    Patient Disposition:  PACU  and extubated and stable    SIGNATURE: Unique Hooks MD  DATE: August 29, 2018  TIME: 10:16 AM

## 2018-08-29 NOTE — H&P (VIEW-ONLY)
[address] [facility]  [facility]  [address] [facility]  [place], [address] [place] [address]  [phone]  www  [facility]  org      NAME: Ben Fofana  AGE: 75 y o  SEX: male  : 1945   MRN: [id number]    DATE: 2018  TIME: 10:13 AM    Assessment and Plan:  Prostate cancer:  Check PSA today  Increasing lower urinary tract symptoms including dysuria:  Send urine cytology and perform cystoscopy and bilateral retrograde pyelography under general anesthesia or IV sedation  We will schedule this  The risks of bleeding infection explained  He gives informed consent  Chief Complaint   No chief complaint on file  History of Present Illness   Prostate cancer: On androgen deprivation therapy window  PSA 0 82 2018  He is due to get a PSA done today  In the past week he has developed increasing nocturia and it is now reached the level of every hour  He is still taking the Ditropan XL 5 mg at bedtime but obviously this is not working  He has dysuria for the past 2 weeks  However come further questioning the symptoms have been getting worse for at least 6 months  CT scan 2018 showed only a 3 mm stone in his right kidney  He is otherwise stone free  The following portions of the patient's history were reviewed and updated as appropriate: allergies, current medications, past family history, past medical history, past social history, past surgical history and problem list     Review of Systems   Review of Systems   Respiratory: Negative  Cardiovascular: Negative  Gastrointestinal: Positive for constipation  Genitourinary: Positive for dysuria, frequency and urgency  Musculoskeletal: Positive for back pain  Active Problem List     Patient Active Problem List   Diagnosis    Malignant neoplasm of prostate ([address] )    Arthritis       Objective   There were no vitals taken for this visit      Physical Exam   Constitutional: He is oriented to person, place, and time  He appears well-developed and well-nourished  HENT:   Head: Normocephalic and atraumatic  Eyes: EOM are normal    Neck: Normal range of motion  Neck supple  No thyromegaly present  Cardiovascular: Normal rate, regular rhythm and normal heart sounds  Pulmonary/Chest: Effort normal and breath sounds normal  No respiratory distress  He has no wheezes  Abdominal: Soft  He exhibits no distension  There is no tenderness  There is no rebound and no guarding  Musculoskeletal: Normal range of motion  Neurological: He is alert and oriented to person, place, and time  Skin: Skin is warm and dry  Psychiatric: He has a normal mood and affect  His behavior is normal  Judgment and thought content normal            Current Medications     Current Outpatient Prescriptions:     allopurinol (ZYLOPRIM) 100 mg tablet, Take 1 tablet (100 mg total) by mouth daily, Disp: 30 tablet, Rfl: 0    amitriptyline (ELAVIL) 10 mg tablet, Take by mouth, Disp: , Rfl:     aspirin 81 MG tablet, Take 81 mg by mouth, Disp: , Rfl:     azelastine (ASTELIN) 0 1 % nasal spray, 2 sprays into each nostril 3 (three) times a day Use in each nostril as directed  , Disp: , Rfl:     baclofen 10 mg tablet, baclofen 10 mg tablet, Disp: , Rfl:     bicalutamide (CASODEX) 50 mg tablet, Take 1 daily, Disp: , Rfl:     Buprenorphine (BUTRANS) 15 MCG/HR PTWK, Apply patch for 1 week at a time  , Disp: , Rfl:     celecoxib (CeleBREX) 200 mg capsule, , Disp: , Rfl:     chlorhexidine (PERIDEX) 0 12 % solution, , Disp: , Rfl:     Cholecalciferol 1000 units capsule, Take 1,000 Units by mouth, Disp: , Rfl:     clonazePAM (KlonoPIN) 0 5 mg tablet, Take 0 5 mg by mouth 3 (three) times a day, Disp: , Rfl:     diclofenac sodium (VOLTAREN) 1 %, Apply 2mg 2 times every day, Disp: , Rfl:     DULoxetine (CYMBALTA) 60 mg delayed release capsule, Take 1 capsule (60mg) by mouth once daily  , Disp: , Rfl:     escitalopram (LEXAPRO) 20 mg tablet, Take 5 mg by mouth daily, Disp: , Rfl:     Exenatide (BYETTA 10 MCG PEN) 10 MCG/0 04ML SOPN, Byetta 10 mcg/dose(250 mcg/mL)2 4 mL subcutaneous pen injector, Disp: , Rfl:     Fexofenadine HCl (MUCINEX ALLERGY PO), Take 1-2 tablets by mouth as needed  , Disp: , Rfl:     glimepiride (AMARYL) 2 mg tablet, Take 2 mg by mouth every morning before breakfast, Disp: , Rfl:     guaiFENesin (MUCINEX) 600 mg 12 hr tablet, As Needed if sinuses are cogged once daily  PRN, Disp: , Rfl:     Hydrocodone-Acetaminophen (VICODIN PO), Take by mouth daily, Disp: , Rfl:     Ibuprofen (ADVIL) 200 MG CAPS, As Needed  PRN, Disp: , Rfl:     Ibuprofen (MOTRIN PO), Take by mouth as needed, Disp: , Rfl:     lidocaine (LIDODERM) 5 %, Place 1 patch on the skin daily Remove & Discard patch within 12 hours or as directed by MD, Disp: 30 patch, Rfl: 0    lidocaine (LIDODERM) 5 %, Place 1 patch on the skin daily Remove & Discard patch within 12 hours or as directed by MD, Disp: 30 patch, Rfl: 0    Liraglutide (VICTOZA SC), Inject under the skin daily, Disp: , Rfl:     LORazepam (ATIVAN) 0 5 mg tablet, as needed, Disp: , Rfl:     metFORMIN (GLUCOPHAGE) 500 mg tablet, Take 500 mg by mouth 3 (three) times a day with meals  , Disp: , Rfl:     montelukast (SINGULAIR) 10 mg tablet, Daily, Disp: , Rfl:     Multiple Vitamins-Minerals (MULTIVITAMIN ADULT PO), Take by mouth, Disp: , Rfl:     Omeprazole 20 MG TBEC, Daily, Disp: , Rfl:     oxybutynin (DITROPAN-XL) 5 mg 24 hr tablet, Take 1 tablet (5 mg total) by mouth daily, Disp: 90 tablet, Rfl: 0    SIMVASTATIN PO, Take 20 mg by mouth daily  , Disp: , Rfl:     TRAMADOL HCL PO, Take by mouth as needed, Disp: , Rfl:     zolpidem (AMBIEN) 10 mg tablet, Take by mouth daily at bedtime, Disp: , Rfl:         Unknown MD Onofre

## 2018-09-17 ENCOUNTER — OFFICE VISIT (OUTPATIENT)
Dept: UROLOGY | Facility: MEDICAL CENTER | Age: 73
End: 2018-09-17
Payer: MEDICARE

## 2018-09-17 DIAGNOSIS — R39.89 BLADDER PAIN: ICD-10-CM

## 2018-09-17 DIAGNOSIS — R35.0 INCREASED URINARY FREQUENCY: ICD-10-CM

## 2018-09-17 DIAGNOSIS — C61 MALIGNANT NEOPLASM OF PROSTATE (HCC): ICD-10-CM

## 2018-09-17 DIAGNOSIS — R30.0 DYSURIA: Primary | ICD-10-CM

## 2018-09-17 LAB
SL AMB  POCT GLUCOSE, UA: 100
SL AMB LEUKOCYTE ESTERASE,UA: ABNORMAL
SL AMB POCT BILIRUBIN,UA: ABNORMAL
SL AMB POCT BLOOD,UA: ABNORMAL
SL AMB POCT CLARITY,UA: CLEAR
SL AMB POCT COLOR,UA: YELLOW
SL AMB POCT KETONES,UA: ABNORMAL
SL AMB POCT NITRITE,UA: ABNORMAL
SL AMB POCT PH,UA: 5.5
SL AMB POCT SPECIFIC GRAVITY,UA: 1.02
SL AMB POCT URINE PROTEIN: ABNORMAL
SL AMB POCT UROBILINOGEN: 0.2

## 2018-09-17 PROCEDURE — 99214 OFFICE O/P EST MOD 30 MIN: CPT | Performed by: UROLOGY

## 2018-09-17 PROCEDURE — 81003 URINALYSIS AUTO W/O SCOPE: CPT | Performed by: UROLOGY

## 2018-09-17 RX ORDER — LORAZEPAM 0.5 MG/1
0.5 TABLET ORAL EVERY 8 HOURS PRN
COMMUNITY

## 2018-09-17 RX ORDER — BICALUTAMIDE 50 MG/1
50 TABLET, FILM COATED ORAL 3 TIMES DAILY
COMMUNITY
End: 2019-03-05 | Stop reason: ALTCHOICE

## 2018-09-17 RX ORDER — ESCITALOPRAM OXALATE 20 MG/1
20 TABLET ORAL DAILY
COMMUNITY
End: 2020-01-07 | Stop reason: ALTCHOICE

## 2018-09-17 RX ORDER — ALLOPURINOL 100 MG/1
100 TABLET ORAL DAILY
COMMUNITY
End: 2018-11-29 | Stop reason: SDUPTHER

## 2018-09-17 NOTE — LETTER
2018     Willey Meckel, MD  [address]    Patient: Tristen Brown   YOB: 1945   Date of Visit: 2018       Dear Dr Watson Alvarenga:    Thank you for referring Tristen Brown to me for evaluation  Below are my notes for this consultation  If you have questions, please do not hesitate to call me  I look forward to following your patient along with you  Sincerely,        Curt Polk MD        CC: No Recipients  Curt Polk MD  2018 11:46 AM  Sign at close encounter  [address] [facility]  [place] [phone]  www  [facility]  org      NAME: Tristen Brown  AGE: 75 y o  SEX: male  : 1945   MRN: [id number]    DATE: 2018  TIME: 9:19 AM    Assessment and Plan:  Prostate cancer on ADT windows: Will recheck PSA in 6 months  Microscopic hematuria:  Negative cystoscopy and retrogrades  Chief Complaint   No chief complaint on file  History of Present Illness   Underwent cystoscopy and bilateral retrograde pyelography by me 2018  This showed moderate BPH with a high bladder neck  Otherwise normal bladder with slightly stenotic left ureteral orifice  This was done for microscopic hematuria and dysuria and he cannot tolerate cystoscopy in the office  He has prostate cancer for which he has received no treatment except for androgen deprivation therapy windows  PSA was 3 1 as of 2018  He has nocturia 7 times a night and some bladder pain since the cystoscopy        The following portions of the patient's history were reviewed and updated as appropriate: allergies, current medications, past family history, past medical history, past social history, past surgical history and problem list     Review of Systems   Review of Systems    Active Problem List     Patient Active Problem List   Diagnosis  Malignant neoplasm of [facility] ([facility] )    Arthritis    Dysuria    Bladder pain    Prostate cancer ([facility] )    Increased urinary frequency       Objective   There were no vitals taken for this visit  Physical Exam   Constitutional: He is oriented to person, place, and time  He appears well-developed and well-nourished  HENT:   Head: Normocephalic and atraumatic  Eyes: EOM are normal    Neck: Normal range of motion  Cardiovascular: Normal rate, regular rhythm and normal heart sounds  Exam reveals no gallop and no friction rub  No murmur heard  Pulmonary/Chest: Effort normal    Musculoskeletal: Normal range of motion  Neurological: He is alert and oriented to person, place, and time  Skin: Skin is warm and dry  Psychiatric: He has a normal mood and affect  His behavior is normal  Judgment and thought content normal            Current Medications     Current Outpatient Prescriptions:     amitriptyline (ELAVIL) 10 mg tablet, Take by mouth, Disp: , Rfl:     aspirin 81 MG tablet, Take 81 mg by mouth, Disp: , Rfl:     azelastine (ASTELIN) 0 1 % nasal spray, 2 sprays into each nostril 3 (three) times a day Use in each nostril as directed  , Disp: , Rfl:     celecoxib (CeleBREX) 200 mg capsule, , Disp: , Rfl:     Cholecalciferol 1000 units capsule, Take 1,000 Units by mouth, Disp: , Rfl:     clonazePAM (KlonoPIN) 0 5 mg tablet, Take 0 5 mg by mouth 3 (three) times a day, Disp: , Rfl:     diclofenac sodium (VOLTAREN) 1 %, Apply 2mg 2 times every day, Disp: , Rfl:     DULoxetine (CYMBALTA) 60 mg delayed release capsule, Take 1 capsule (60mg) by mouth once daily  , Disp: , Rfl:     Fexofenadine HCl (MUCINEX ALLERGY PO), Take 1-2 tablets by mouth as needed  , Disp: , Rfl:     glimepiride (AMARYL) 2 mg tablet, Take 2 mg by mouth every morning before breakfast, Disp: , Rfl:     Hydrocodone-Acetaminophen (VICODIN PO), Take by mouth daily, Disp: , Rfl:     hydrocortisone (WESTCORT) 0 2 % cream, , Disp: , Rfl:     Ibuprofen (MOTRIN PO), Take by mouth as needed, Disp: , Rfl:     lidocaine (LIDODERM) 5 %, Place 1 patch on the skin daily Remove & Discard patch within 12 hours or as directed by MD, Disp: 30 patch, Rfl: 0    Liraglutide (VICTOZA SC), Inject under the skin daily, Disp: , Rfl:     metFORMIN (GLUCOPHAGE) 500 mg tablet, Take 500 mg by mouth 3 (three) times a day with meals  , Disp: , Rfl:     montelukast (SINGULAIR) 10 mg tablet, Daily, Disp: , Rfl:     Multiple Vitamins-Minerals (MULTIVITAMIN ADULT PO), Take by mouth, Disp: , Rfl:     mupirocin (BACTROBAN) 2 % ointment, , Disp: , Rfl:     Omeprazole 20 MG TBEC, daily as needed  , Disp: , Rfl:     oxybutynin (DITROPAN-XL) 5 mg 24 hr tablet, Take 1 tablet (5 mg total) by mouth daily, Disp: 90 tablet, Rfl: 0    SIMVASTATIN PO, Take 20 mg by mouth daily  , Disp: , Rfl:     TRAMADOL HCL PO, Take by mouth as needed, Disp: , Rfl:     zolpidem (AMBIEN) 10 mg tablet, Take by mouth daily at bedtime, Disp: , Rfl:         Isreal Scott MD

## 2018-09-17 NOTE — PROGRESS NOTES
[address] [facility]  [facility]  [address] [facility]  [place], [address] [facility]  [phone]  www  [facility]  org      NAME: Tristen Brown  AGE: 75 y o  SEX: male  : 1945   MRN: [id number]    DATE: 2018  TIME: 9:19 AM    Assessment and Plan:  Prostate cancer on ADT windows: Will recheck PSA in 6 months  Microscopic hematuria:  Negative cystoscopy and retrogrades  Chief Complaint   No chief complaint on file  History of Present Illness   Underwent cystoscopy and bilateral retrograde pyelography by me 2018  This showed moderate BPH with a high bladder neck  Otherwise normal bladder with slightly stenotic left ureteral orifice  This was done for microscopic hematuria and dysuria and he cannot tolerate cystoscopy in the office  He has prostate cancer for which he has received no treatment except for androgen deprivation therapy windows  PSA was 3 1 as of 2018  He has nocturia 7 times a night and some bladder pain since the cystoscopy  The following portions of the patient's history were reviewed and updated as appropriate: allergies, current medications, past family history, past medical history, past social history, past surgical history and problem list     Review of Systems   Review of Systems    Active Problem List     Patient Active Problem List   Diagnosis    Malignant neoplasm of prostate ([address] )    Arthritis    Dysuria    Bladder pain    Prostate cancer ([address] )    Increased urinary frequency       Objective   There were no vitals taken for this visit  Physical Exam   Constitutional: He is oriented to person, place, and time  He appears well-developed and well-nourished  HENT:   Head: Normocephalic and atraumatic  Eyes: EOM are normal    Neck: Normal range of motion  Cardiovascular: Normal rate, regular rhythm and normal heart sounds  Exam reveals no gallop and no friction rub      No murmur heard   Pulmonary/Chest: Effort normal    Musculoskeletal: Normal range of motion  Neurological: He is alert and oriented to person, place, and time  Skin: Skin is warm and dry  Psychiatric: He has a normal mood and affect  His behavior is normal  Judgment and thought content normal            Current Medications     Current Outpatient Prescriptions:     amitriptyline (ELAVIL) 10 mg tablet, Take by mouth, Disp: , Rfl:     aspirin 81 MG tablet, Take 81 mg by mouth, Disp: , Rfl:     azelastine (ASTELIN) 0 1 % nasal spray, 2 sprays into each nostril 3 (three) times a day Use in each nostril as directed  , Disp: , Rfl:     celecoxib (CeleBREX) 200 mg capsule, , Disp: , Rfl:     Cholecalciferol 1000 units capsule, Take 1,000 Units by mouth, Disp: , Rfl:     clonazePAM (KlonoPIN) 0 5 mg tablet, Take 0 5 mg by mouth 3 (three) times a day, Disp: , Rfl:     diclofenac sodium (VOLTAREN) 1 %, Apply 2mg 2 times every day, Disp: , Rfl:     DULoxetine (CYMBALTA) 60 mg delayed release capsule, Take 1 capsule (60mg) by mouth once daily  , Disp: , Rfl:     Fexofenadine HCl (MUCINEX ALLERGY PO), Take 1-2 tablets by mouth as needed  , Disp: , Rfl:     glimepiride (AMARYL) 2 mg tablet, Take 2 mg by mouth every morning before breakfast, Disp: , Rfl:     Hydrocodone-Acetaminophen (VICODIN PO), Take by mouth daily, Disp: , Rfl:     hydrocortisone (WESTCORT) 0 2 % cream, , Disp: , Rfl:     Ibuprofen (MOTRIN PO), Take by mouth as needed, Disp: , Rfl:     lidocaine (LIDODERM) 5 %, Place 1 patch on the skin daily Remove & Discard patch within 12 hours or as directed by MD, Disp: 30 patch, Rfl: 0    Liraglutide (VICTOZA SC), Inject under the skin daily, Disp: , Rfl:     metFORMIN (GLUCOPHAGE) 500 mg tablet, Take 500 mg by mouth 3 (three) times a day with meals  , Disp: , Rfl:     montelukast (SINGULAIR) 10 mg tablet, Daily, Disp: , Rfl:     Multiple Vitamins-Minerals (MULTIVITAMIN ADULT PO), Take by mouth, Disp: , Rfl:   mupirocin (BACTROBAN) 2 % ointment, , Disp: , Rfl:     Omeprazole 20 MG TBEC, daily as needed  , Disp: , Rfl:     oxybutynin (DITROPAN-XL) 5 mg 24 hr tablet, Take 1 tablet (5 mg total) by mouth daily, Disp: 90 tablet, Rfl: 0    SIMVASTATIN PO, Take 20 mg by mouth daily  , Disp: , Rfl:     TRAMADOL HCL PO, Take by mouth as needed, Disp: , Rfl:     zolpidem (AMBIEN) 10 mg tablet, Take by mouth daily at bedtime, Disp: , Rfl:         Isreal Scott MD

## 2018-10-16 ENCOUNTER — TELEPHONE (OUTPATIENT)
Dept: UROLOGY | Facility: AMBULATORY SURGERY CENTER | Age: 73
End: 2018-10-16

## 2018-10-16 NOTE — TELEPHONE ENCOUNTER
Patient has urgency and frequency for 24 hours he said  He had some diarrhea  last night and was constipated this morning he said,so he took a suppository  He has had lower abdominal pain,and gas  There is no fever or blood  The abdominal leora was a 10 on a scale of 10,he took another Vicodin at noon,he had taken 1 Vicodin this morning at 6am, this did help a lot  He drank 24 oz  Of  Fluids  Today so far  He is on Oxybutynin 5  mg ,  1 daily also  Any orders?

## 2018-10-16 NOTE — TELEPHONE ENCOUNTER
Spoke with patient, patient said he feels pain right below his belly button  He also has been urinating frequently and it burns  Please call patient back  Thank you

## 2018-10-17 NOTE — TELEPHONE ENCOUNTER
His abdominal pain is gone and his diarrhea  But He had to get up last night 9 times to urinate , he said  He only drinks maybe 2 cups of decaf tea a day  No caffeine  He was taking his Oxybutynin 5 mg in the am , I told him to take it after dinner in the evening  He has no pain or burning he said with this and no blood  Any orders?

## 2018-10-17 NOTE — TELEPHONE ENCOUNTER
Patient would like 90 day script for Oxybutynin 5 mg sent to [facility] on file , please  He was asking about the over the counter Super Beta Prostate pills over the counter,Not sure he needs that? He will call us if problems continue

## 2018-10-19 DIAGNOSIS — R35.0 INCREASED URINARY FREQUENCY: ICD-10-CM

## 2018-10-19 RX ORDER — OXYBUTYNIN CHLORIDE 5 MG/1
5 TABLET, EXTENDED RELEASE ORAL DAILY
Qty: 90 TABLET | Refills: 3 | Status: SHIPPED | OUTPATIENT
Start: 2018-10-19 | End: 2019-06-04 | Stop reason: ALTCHOICE

## 2018-10-19 NOTE — TELEPHONE ENCOUNTER
Informed the patient that the prescription for the Oxybutynin 5mg has been sent to his [facility] [address]  As for the Super Beta Prostate supplements, Dr Veliz Parent recommends that he discontinue them

## 2018-10-19 NOTE — TELEPHONE ENCOUNTER
Please let him know that I sent in prescription as requested  He does not need to take 0 over-the-counter super beta prostate pills

## 2018-10-31 ENCOUNTER — APPOINTMENT (EMERGENCY)
Dept: CT IMAGING | Facility: HOSPITAL | Age: 73
End: 2018-10-31
Payer: MEDICARE

## 2018-10-31 ENCOUNTER — TELEPHONE (OUTPATIENT)
Dept: UROLOGY | Facility: MEDICAL CENTER | Age: 73
End: 2018-10-31

## 2018-10-31 ENCOUNTER — HOSPITAL ENCOUNTER (EMERGENCY)
Facility: HOSPITAL | Age: 73
Discharge: HOME | End: 2018-10-31
Attending: EMERGENCY MEDICINE | Admitting: EMERGENCY MEDICINE
Payer: MEDICARE

## 2018-10-31 VITALS
HEART RATE: 70 BPM | SYSTOLIC BLOOD PRESSURE: 138 MMHG | TEMPERATURE: 97.4 F | OXYGEN SATURATION: 94 % | DIASTOLIC BLOOD PRESSURE: 73 MMHG | RESPIRATION RATE: 18 BRPM

## 2018-10-31 DIAGNOSIS — N32.89 BLADDER SPASM: Primary | ICD-10-CM

## 2018-10-31 DIAGNOSIS — M54.9 CHRONIC BACK PAIN: ICD-10-CM

## 2018-10-31 DIAGNOSIS — R73.9 HYPERGLYCEMIA: ICD-10-CM

## 2018-10-31 DIAGNOSIS — G89.29 CHRONIC BACK PAIN: ICD-10-CM

## 2018-10-31 LAB
ANION GAP SERPL CALCULATED.3IONS-SCNC: 8 MMOL/L (ref 4–13)
BASOPHILS # BLD AUTO: 0.05 THOUSANDS/ΜL (ref 0–0.1)
BASOPHILS NFR BLD AUTO: 1 % (ref 0–1)
BILIRUB UR QL STRIP: NEGATIVE
BUN SERPL-MCNC: 11 MG/DL (ref 5–25)
CALCIUM SERPL-MCNC: 9.1 MG/DL (ref 8.3–10.1)
CHLORIDE SERPL-SCNC: 98 MMOL/L (ref 100–108)
CLARITY UR: CLEAR
CO2 SERPL-SCNC: 28 MMOL/L (ref 21–32)
COLOR UR: YELLOW
COLOR, POC: YELLOW
CREAT SERPL-MCNC: 0.86 MG/DL (ref 0.6–1.3)
EOSINOPHIL # BLD AUTO: 0.06 THOUSAND/ΜL (ref 0–0.61)
EOSINOPHIL NFR BLD AUTO: 2 % (ref 0–6)
ERYTHROCYTE [DISTWIDTH] IN BLOOD BY AUTOMATED COUNT: 12 % (ref 11.6–15.1)
GFR SERPL CREATININE-BSD FRML MDRD: 85 ML/MIN/1.73SQ M
GLUCOSE SERPL-MCNC: 223 MG/DL (ref 65–140)
GLUCOSE UR STRIP-MCNC: ABNORMAL MG/DL
HCT VFR BLD AUTO: 39.9 % (ref 36.5–49.3)
HGB BLD-MCNC: 13.2 G/DL (ref 12–17)
HGB UR QL STRIP.AUTO: NEGATIVE
IMM GRANULOCYTES # BLD AUTO: 0.02 THOUSAND/UL (ref 0–0.2)
IMM GRANULOCYTES NFR BLD AUTO: 1 % (ref 0–2)
KETONES UR STRIP-MCNC: NEGATIVE MG/DL
LEUKOCYTE ESTERASE UR QL STRIP: NEGATIVE
LYMPHOCYTES # BLD AUTO: 0.56 THOUSANDS/ΜL (ref 0.6–4.47)
LYMPHOCYTES NFR BLD AUTO: 14 % (ref 14–44)
MCH RBC QN AUTO: 29.3 PG (ref 26.8–34.3)
MCHC RBC AUTO-ENTMCNC: 33.1 G/DL (ref 31.4–37.4)
MCV RBC AUTO: 89 FL (ref 82–98)
MONOCYTES # BLD AUTO: 0.36 THOUSAND/ΜL (ref 0.17–1.22)
MONOCYTES NFR BLD AUTO: 9 % (ref 4–12)
NEUTROPHILS # BLD AUTO: 2.83 THOUSANDS/ΜL (ref 1.85–7.62)
NEUTS SEG NFR BLD AUTO: 73 % (ref 43–75)
NITRITE UR QL STRIP: NEGATIVE
NRBC BLD AUTO-RTO: 0 /100 WBCS
PH UR STRIP.AUTO: 6 [PH] (ref 4.5–8)
PLATELET # BLD AUTO: 235 THOUSANDS/UL (ref 149–390)
PMV BLD AUTO: 9.5 FL (ref 8.9–12.7)
POTASSIUM SERPL-SCNC: 5.6 MMOL/L (ref 3.5–5.3)
PROT UR STRIP-MCNC: NEGATIVE MG/DL
RBC # BLD AUTO: 4.5 MILLION/UL (ref 3.88–5.62)
SODIUM SERPL-SCNC: 134 MMOL/L (ref 136–145)
SP GR UR STRIP.AUTO: 1.01 (ref 1–1.03)
UROBILINOGEN UR QL STRIP.AUTO: 0.2 E.U./DL
WBC # BLD AUTO: 3.88 THOUSAND/UL (ref 4.31–10.16)

## 2018-10-31 PROCEDURE — 81003 URINALYSIS AUTO W/O SCOPE: CPT

## 2018-10-31 PROCEDURE — 99284 EMERGENCY DEPT VISIT MOD MDM: CPT

## 2018-10-31 PROCEDURE — 74176 CT ABD & PELVIS W/O CONTRAST: CPT

## 2018-10-31 PROCEDURE — 36415 COLL VENOUS BLD VENIPUNCTURE: CPT | Performed by: EMERGENCY MEDICINE

## 2018-10-31 PROCEDURE — 85025 COMPLETE CBC W/AUTO DIFF WBC: CPT | Performed by: EMERGENCY MEDICINE

## 2018-10-31 PROCEDURE — 80048 BASIC METABOLIC PNL TOTAL CA: CPT | Performed by: EMERGENCY MEDICINE

## 2018-10-31 PROCEDURE — 51798 US URINE CAPACITY MEASURE: CPT

## 2018-10-31 RX ORDER — HYDROCODONE BITARTRATE AND ACETAMINOPHEN 5; 325 MG/1; MG/1
1 TABLET ORAL ONCE
Status: COMPLETED | OUTPATIENT
Start: 2018-10-31 | End: 2018-10-31

## 2018-10-31 RX ADMIN — HYDROCODONE BITARTRATE AND ACETAMINOPHEN 1 TABLET: 5; 325 TABLET ORAL at 12:10

## 2018-10-31 NOTE — TELEPHONE ENCOUNTER
Patient having a lot of pain lower abdomen and penis,Frequency of urine Q 1 hour,he said he urinates  2 oz at a time  He took Vicodin ,but does not help  No fever or blood seen  His pain is a 8 - 10 on a scale of 10  I talked with Dr Nevaeh Olvera and patient is to go to the [facility]  Now at [address]  Patient was given the order to go to the ER now  He will do this

## 2018-10-31 NOTE — DISCHARGE INSTRUCTIONS
Back Pain   WHAT YOU NEED TO KNOW:   Back pain is common  It can be caused by many conditions, such as arthritis or the breakdown of spinal discs  Your risk for back pain is increased by injuries, lack of activity, or repeated bending and twisting  You may feel sore or stiff on one or both sides of your back  The pain may spread to your buttocks or thighs  DISCHARGE INSTRUCTIONS:   Medicines:   · NSAIDs  help decrease swelling and pain  This medicine is available with or without a doctor's order  NSAIDs can cause stomach bleeding or kidney problems in certain people  If you take blood thinner medicine, always ask your healthcare provider if NSAIDs are safe for you  Always read the medicine label and follow directions  · Acetaminophen  decreases pain  It is available without a doctor's order  Ask how much to take and how often to take it  Follow directions  Acetaminophen can cause liver damage if not taken correctly  · Prescription pain medicine  may be given  Ask your healthcare provider how to take this medicine safely  · Take your medicine as directed  Contact your healthcare provider if you think your medicine is not helping or if you have side effects  Tell him or her if you are allergic to any medicine  Keep a list of the medicines, vitamins, and herbs you take  Include the amounts, and when and why you take them  Bring the list or the pill bottles to follow-up visits  Carry your medicine list with you in case of an emergency  Follow up with your healthcare provider in 2 weeks, or as directed:  Write down your questions so you remember to ask them during your visits  How to manage your back pain:   · Apply ice  on your back or affected area for 15 to 20 minutes every hour or as directed  Use an ice pack, or put crushed ice in a plastic bag  Cover it with a towel  Ice helps prevent tissue damage and decreases pain      · Apply heat  on your back or affected area for 20 to 30 minutes every 2 hours for as many days as directed  Heat helps decrease pain and muscle spasms  · Stay active  as much as you can without causing more pain  Bed rest could make your back pain worse  Avoid heavy lifting until your pain is gone  Return to the emergency department if:   · You have pain, numbness, or weakness in one or both legs  · Your pain becomes so severe that you cannot walk  · You cannot control your urine or bowel movements  · You have severe back pain with chest pain  · You have severe back pain, nausea, and vomiting  · You have severe back pain that spreads to your side or genital area  Contact your healthcare provider if:   · You have back pain that does not get better with rest and pain medicine  · You have a fever  · You have pain that worsens when you are on your back or when you rest     · You have pain that worsens when you cough or sneeze  · You lose weight without trying  · You have questions or concerns about your condition or care  © 2017 [address] [employer] Information is for End User's use only and may not be sold, redistributed or otherwise used for commercial purposes  All illustrations and images included in CareNotes® are the copyrighted property of [employer]  or [employer]  The above information is an  only  It is not intended as medical advice for individual conditions or treatments  Talk to your doctor, nurse or pharmacist before following any medical regimen to see if it is safe and effective for you  Diabetic Hyperglycemia   WHAT YOU NEED TO KNOW:   Diabetic hyperglycemia is a blood glucose (sugar) level that is higher than your healthcare provider recommends  You may have increased thirst and urinate more often than usual  Over time, uncontrolled diabetes can damage your nerves, blood vessels, tissues, and organs  That is why it is important to manage diabetic hyperglycemia   Without treatment, diabetic hyperglycemia can lead to diabetic ketoacidosis (DKA) or hyperglycemic hyperosmolar state (HHS)  These are serious conditions that can become life-threatening  DISCHARGE INSTRUCTIONS:   Return to the emergency department if:   · You have shortness of breath  · Your breath smells fruity  · You have nausea and vomiting  · You have symptoms of dehydration, such as dark yellow urine, dry mouth and lips, and dry skin  Contact your healthcare provider if:   · You continue to have higher blood sugar levels than your healthcare provider recommends  · Your blood sugar level is over 240 mg/dl and  you have ketones in your urine  · You have questions or concerns about your condition or care  Medicines:   · Medicines  such as insulin and hypoglycemic medicine decrease blood sugar levels  · Take your medicine as directed  Contact your healthcare provider if you think your medicine is not helping or if you have side effects  Tell him or her if you are allergic to any medicine  Keep a list of the medicines, vitamins, and herbs you take  Include the amounts, and when and why you take them  Bring the list or the pill bottles to follow-up visits  Carry your medicine list with you in case of an emergency  Follow up with your healthcare provider or specialist as directed: Your healthcare provider may refer you to a dietitian or diabetes specialist  Write down your questions so you remember to ask them during your visits  Manage diabetic hyperglycemia:   · If you take diabetes medicine or insulin, take it as directed  Missed or wrong doses can cause your blood sugar to go up  · Tell your healthcare provider if you continue to have trouble managing your blood sugar  He may change the type, amount, or timing of your diabetes medicine or insulin  If you do not take diabetes medicine or insulin, you may need to start  · Work with your healthcare provider to develop a sick day plan    Illness can cause your blood sugar to rise  A sick day plan helps you control your blood sugar level when you are sick  Prevent diabetic hyperglycemia:   · Check your blood sugar levels regularly  Ask your healthcare provider how often to check your blood sugar and what your levels should be  · Follow your meal plan  Your blood sugar can go up if you eat a large meal or you eat more carbohydrates than recommended  Work with a dietitian to develop a meal plan that is right for you  · Exercise regularly  to help lower your blood sugar when it is high  It can also keep your blood sugar levels steady over time  Exercise for at least 30 minutes, 5 days a week  Include muscle strengthening activities 2 days each week  Do not sit for longer than 90 minutes at a time  Work with your healthcare provider to create an exercise plan  Children should get at least 60 minutes of physical activity each day  · Check your ketones before exercise  if your blood sugar level is above 240 mg/dl  Do not exercise if you have ketones in your urine,  because your blood sugar level may rise even more  Ask your healthcare provider how to lower your blood sugar when you have ketones  © 2017 [facility] Information is for End User's use only and may not be sold, redistributed or otherwise used for commercial purposes  All illustrations and images included in CareNotes® are the copyrighted property of [employer]  or [employer]  The above information is an  only  It is not intended as medical advice for individual conditions or treatments  Talk to your doctor, nurse or pharmacist before following any medical regimen to see if it is safe and effective for you

## 2018-10-31 NOTE — ED PROVIDER NOTES
History  Chief Complaint   Patient presents with    Flank Pain     Pt complains of right sided flank pain that travels to his lower abdomen for the past 3 days  Pt states that the pain is "pain like a kidney stone" and that he has had 12 kidney stones in the past  Pt also complains of increased frequency in urination       History provided by:  Patient   used: No    Medical Problem - Major   Location:  Complains of pain in the bladder area raise had prior spasm as well as low back pain which was worse today radiating to bilateral hips  Severity:  Severe  Onset quality:  Sudden  Duration: He noted it is was much worse this morning  Timing:  Constant  Progression:  Partially resolved  Chronicity:  Recurrent  Relieved by:  Nothing  Worsened by: Movement  Ineffective treatments:  Took his Vicodin at home which partially resolved his symptoms  Associated symptoms: abdominal pain    Associated symptoms: no chest pain, no cough, no diarrhea, no fever, no headaches, no nausea, no shortness of breath, no sore throat and no vomiting     History of prostate cancer taking estrogen followed up by [facility]  History of bladder spasms  Apparently had an unremarkable cystoscopy  Also states he has a history of kidney stones  Also complains of lower back pain for which she takes chronic pain medication at radiates to both hips which is not unusual for him but was much worse today  No recent trauma  He is a diabetic but does not really follow a diabetic type diet  He complains of waking up and multiple times in the middle night to urinate  No dysuria  He does feel like he empties his bladder completely  No postvoid dribbling  No fevers or chills  No nausea or vomiting  Prior to Admission Medications   Prescriptions Last Dose Informant Patient Reported? Taking?    Fexofenadine HCl (MUCINEX ALLERGY PO)  Self Yes Yes   Sig: Take 1-2 tablets by mouth as needed Hydrocodone-Acetaminophen (VICODIN PO)  Self Yes Yes   Sig: Take by mouth daily   LORazepam (ATIVAN) 0 5 mg tablet  Self Yes Yes   Sig: Take 0 5 mg by mouth every 8 (eight) hours as needed for anxiety   Liraglutide (VICTOZA SC)  Self Yes Yes   Sig: Inject under the skin daily   Multiple Vitamins-Minerals (MULTIVITAMIN ADULT PO)  Self Yes Yes   Sig: Take by mouth   Omeprazole 20 MG TBEC  Self Yes Yes   Sig: daily as needed     SIMVASTATIN PO  Self Yes Yes   Sig: Take 20 mg by mouth daily     TRAMADOL HCL PO  Self Yes Yes   Sig: Take by mouth as needed   allopurinol (ZYLOPRIM) 100 mg tablet  Self Yes Yes   Sig: Take 100 mg by mouth daily   aspirin 81 MG tablet  Self Yes Yes   Sig: Take 81 mg by mouth   azelastine (ASTELIN) 0 1 % nasal spray  Self Yes Yes   Si sprays into each nostril 3 (three) times a day Use in each nostril as directed     bicalutamide (CASODEX) 50 mg tablet  Self Yes Yes   Sig: Take 50 mg by mouth 3 (three) times a day   clonazePAM (KlonoPIN) 0 5 mg tablet  Self Yes Yes   Sig: Take 0 5 mg by mouth 3 (three) times a day   diclofenac sodium (VOLTAREN) 1 %  Self Yes Yes   Sig: Apply 2mg 2 times every day   escitalopram (LEXAPRO) 20 mg tablet  Self Yes Yes   Sig: Take 20 mg by mouth daily   glimepiride (AMARYL) 2 mg tablet  Self Yes Yes   Sig: Take 2 mg by mouth every morning before breakfast   hydrocortisone (WESTCORT) 0 2 % cream  Self Yes Yes   metFORMIN (GLUCOPHAGE) 500 mg tablet  Self Yes Yes   Sig: Take 500 mg by mouth 3 (three) times a day with meals     montelukast (SINGULAIR) 10 mg tablet  Self Yes Yes   Sig: Daily   mupirocin (BACTROBAN) 2 % ointment  Self Yes Yes   oxybutynin (DITROPAN-XL) 5 mg 24 hr tablet   No Yes   Sig: Take 1 tablet (5 mg total) by mouth daily for 90 days   zolpidem (AMBIEN) 10 mg tablet  Self Yes Yes   Sig: Take by mouth daily at bedtime      Facility-Administered Medications: None       Past Medical History:   Diagnosis Date    Arthritis     Chronic bladder pain 06/05/2017    Chronic pain     Diabetes mellitus ([facility] [address] )     Elevated PSA 2016    Frequency of urination     Hematuria, gross 06/15/2017    Hypercholesterolemia     Hyperlipidemia     Kidney stones 11/01/2016    [facility] )     Malignant neoplasm of prostate ([facility] ) 04/20/2017    Pain in hip     Skin cancer     Sleep apnea        Past Surgical History:   Procedure Laterality Date    CYSTOSCOPY  06/09/2017    ND CYSTOURETHROSCOPY,URETER CATHETER Bilateral 8/29/2018    Procedure: CYSTOSCOPY,  b/l retrogrades pyelogram;  Surgeon: Leonidas Walsh MD;  Location: [facility] OR;  Service: Urology    PROSTATE BIOPSY  04/06/2017    SINUS SURGERY  2010    SKIN CANCER EXCISION         Family History   Problem Relation Age of Onset    Heart disease Mother      I have reviewed and agree with the history as documented  Social History   Substance Use Topics    Smoking status: Never Smoker    Smokeless tobacco: Never Used    Alcohol use No        Review of Systems   Constitutional: Negative for chills and fever  HENT: Negative for sore throat  Respiratory: Negative for cough, chest tightness and shortness of breath  Cardiovascular: Negative for chest pain  Gastrointestinal: Positive for abdominal pain  Negative for diarrhea, nausea and vomiting  Genitourinary: Negative for dysuria and flank pain  Musculoskeletal: Positive for back pain  Negative for arthralgias and neck pain  Neurological: Negative for weakness and headaches  All other systems reviewed and are negative  Physical Exam  Physical Exam   Constitutional: He appears well-developed and well-nourished  He is cooperative  Non-toxic appearance  He does not have a sickly appearance  He does not appear ill  No distress  HENT:   Head: Normocephalic and atraumatic  Right Ear: Hearing normal  No drainage or swelling  Left Ear: Hearing normal  No drainage or swelling     Mouth/Throat: Mucous membranes are normal    Eyes: Conjunctivae and lids are normal  Right eye exhibits no discharge  Left eye exhibits no discharge  Neck: Trachea normal and normal range of motion  No JVD present  Cardiovascular: Normal rate, regular rhythm, normal heart sounds, intact distal pulses and normal pulses  Exam reveals no gallop and no friction rub  No murmur heard  Pulmonary/Chest: Effort normal and breath sounds normal  No stridor  No respiratory distress  He has no wheezes  He has no rales  Abdominal: Soft  Normal appearance  He exhibits no ascites  There is no hepatosplenomegaly  There is tenderness in the suprapubic area  There is no rebound, no guarding and no CVA tenderness  Musculoskeletal: Normal range of motion  He exhibits no edema  Thoracic back: Normal         Lumbar back: He exhibits tenderness, bony tenderness and pain  He exhibits normal range of motion, no swelling, no edema, no deformity and no spasm  Lymphadenopathy:        Right: No inguinal adenopathy present  Left: No inguinal adenopathy present  Neurological: He is alert  He has normal strength  No sensory deficit  He exhibits normal muscle tone  Gait normal  GCS eye subscore is 4  GCS verbal subscore is 5  GCS motor subscore is 6  Skin: Skin is warm, dry and intact  No rash noted  He is not diaphoretic  No pallor  Psychiatric: He has a normal mood and affect  His speech is normal  Cognition and memory are normal    Nursing note and vitals reviewed        Vital Signs  ED Triage Vitals [10/31/18 1119]   Temperature Pulse Respirations Blood Pressure SpO2   (!) 97 4 °F (36 3 °C) 62 18 142/77 97 %      Temp Source Heart Rate Source Patient Position - Orthostatic VS BP Location FiO2 (%)   Oral Monitor Lying Right arm --      Pain Score       8           Vitals:    10/31/18 1119 10/31/18 1338 10/31/18 1543   BP: 142/77 142/67 138/73   Pulse: 62 66 70   Patient Position - Orthostatic VS: Lying Lying Sitting       Visual Acuity      ED Medications  Medications HYDROcodone-acetaminophen (NORCO) 5-325 mg per tablet 1 tablet (1 tablet Oral Given 10/31/18 1210)       Diagnostic Studies  Results Reviewed     Procedure Component Value Units Date/Time    POCT urinalysis dipstick [[id number]]  (Normal) Resulted:  10/31/18 1226    Lab Status:  Final result Specimen:  Urine Updated:  10/31/18 1301     Color, UA yellow    Basic metabolic panel [[id number]]  (Abnormal) Collected:  10/31/18 1221    Lab Status:  Final result Specimen:  Blood from Hand, Left Updated:  10/31/18 1257     Sodium 134 (L) mmol/L      Potassium 5 6 (H) mmol/L      Chloride 98 (L) mmol/L      CO2 28 mmol/L      ANION GAP 8 mmol/L      BUN 11 mg/dL      Creatinine 0 86 mg/dL      Glucose 223 (H) mg/dL      Calcium 9 1 mg/dL      eGFR 85 ml/min/1 73sq m     Narrative:         National Kidney Disease Education Program recommendations are as follows:  GFR calculation is accurate only with a steady state creatinine  Chronic Kidney disease less than 60 ml/min/1 73 sq  meters  Kidney failure less than 15 ml/min/1 73 sq  meters      CBC and differential [[id number]]  (Abnormal) Collected:  10/31/18 1221    Lab Status:  Final result Specimen:  Blood from Hand, Left Updated:  10/31/18 1238     WBC 3 88 (L) Thousand/uL      RBC 4 50 Million/uL      Hemoglobin 13 2 g/dL      Hematocrit 39 9 %      MCV 89 fL      MCH 29 3 pg      MCHC 33 1 g/dL      RDW 12 0 %      MPV 9 5 fL      Platelets 169 Thousands/uL      nRBC 0 /100 WBCs      Neutrophils Relative 73 %      Immat GRANS % 1 %      Lymphocytes Relative 14 %      Monocytes Relative 9 %      Eosinophils Relative 2 %      Basophils Relative 1 %      Neutrophils Absolute 2 83 Thousands/µL      Immature Grans Absolute 0 02 Thousand/uL      Lymphocytes Absolute 0 56 (L) Thousands/µL      Monocytes Absolute 0 36 Thousand/µL      Eosinophils Absolute 0 06 Thousand/µL      Basophils Absolute 0 05 Thousands/µL     ED Urine Macroscopic [[id number]]  (Abnormal) Collected:  10/31/18 1239 Lab Status:  Final result Specimen:  Urine Updated:  10/31/18 1226     Color, UA Yellow     Clarity, UA Clear     pH, UA 6 0     Leukocytes, UA Negative     Nitrite, UA Negative     Protein, UA Negative mg/dl      Glucose,  (1/10%) (A) mg/dl      Ketones, UA Negative mg/dl      Urobilinogen, UA 0 2 E U /dl      Bilirubin, UA Negative     Blood, UA Negative     Specific Gravity, UA 1 015    Narrative:       CLINITEK RESULT                 CT renal stone study abdomen pelvis without contrast   Final Result by Sid Boucher MD (10/31 1429)      1  Small right-sided nonobstructive nephrolithiasis without further urinary calculi appreciated  2   Cholelithiasis without cholecystitis  3   Diverticular disease without diverticulitis  Normal appendix clearly seen  Workstation performed: [id number]         CT recon only lumbar spine   Final Result by Sid Boucher MD (10/31 1429)   Spondylotic changes of the lumbar spine as above  No evidence of acute osseous abnormality  Workstation performed: [id number]                    Procedures  Procedures       Phone Contacts  ED Phone Contact    ED Course                               MDM  Number of Diagnoses or Management Options  Bladder spasm:   Chronic back pain:   Hyperglycemia:   Diagnosis management comments: I reviewed the patient's CT scans and laboratory studies with him  He is hyperglycemic and I went over some diet tips with him as he is eating foods that are high in sugar content  Nothing acute on the CT no signs of urinary tract infections this may be more bladder spasm  He does have chronic back pain this seems to be the same type of pain with the same radiation with no neurological abnormalities at the present time         Amount and/or Complexity of Data Reviewed  Clinical lab tests: reviewed and ordered  Tests in the radiology section of CPT®: ordered and reviewed    Patient Progress  Patient progress: stable    CritCare Time    Disposition  Final diagnoses:   Bladder spasm   Hyperglycemia   Chronic back pain     Time reflects when diagnosis was documented in both MDM as applicable and the Disposition within this note     Time User Action Codes Description Comment    10/31/2018  3:36 PM Linard Leventhal Add [N32 89] Bladder spasm     10/31/2018  3:36 PM Linard Leventhal Add [R73 9] Hyperglycemia     10/31/2018  3:36 PM Linard Leventhal Add [M54 9,  G89 29] Chronic back pain       ED Disposition     ED Disposition Condition Comment    Discharge  Dylan Gabriel discharge to home/self care  Condition at discharge: Good        Follow-up Information     Follow up With Specialties Details Why Contact Info Additional Information    Susan Goodman MD Internal Medicine Schedule an appointment as soon as possible for a visit in 1 week  [address] [place] [id number] [phone]       [facility] Schedule an appointment as soon as possible for a visit in 1 week  [address]  [phone]  8  [facility] [place] [facility] [place], [facility] [place], [phone]          Patient's Medications   Discharge Prescriptions    No medications on file     No discharge procedures on file      ED Provider  Electronically Signed by           Xiao Ariza MD  10/31/18 9860

## 2018-11-01 ENCOUNTER — TELEPHONE (OUTPATIENT)
Dept: UROLOGY | Facility: MEDICAL CENTER | Age: 73
End: 2018-11-01

## 2018-11-01 NOTE — TELEPHONE ENCOUNTER
Patient's wife called  Her  was in the ER yesterday, he's very weak, and she wants to discuss his care with a nurse or Dr Zoltan Bowden   Please call her at [phone]

## 2018-11-02 NOTE — TELEPHONE ENCOUNTER
Patient is home,he was in the ER and has Hyperglycemia and is to make an ov with his PCP in 1 week  He did not have a urine infection and his 2-3 mm stone presents no problem at this time per ER  He will call us if any problems

## 2018-11-02 NOTE — TELEPHONE ENCOUNTER
Pt's wife called asking if Dr Simon would order water therapy at [address] [place] [address], she also stated he fell when getting up out of chair the evening of  10/31/18

## 2018-11-28 RX ORDER — PEN NEEDLE, DIABETIC 31 GX5/16"
NEEDLE, DISPOSABLE MISCELLANEOUS
COMMUNITY
Start: 2018-09-22

## 2018-11-28 RX ORDER — LIRAGLUTIDE 6 MG/ML
INJECTION SUBCUTANEOUS
COMMUNITY
Start: 2018-09-22

## 2018-11-29 ENCOUNTER — OFFICE VISIT (OUTPATIENT)
Dept: UROLOGY | Facility: MEDICAL CENTER | Age: 73
End: 2018-11-29
Payer: MEDICARE

## 2018-11-29 VITALS
HEIGHT: 69 IN | HEART RATE: 69 BPM | DIASTOLIC BLOOD PRESSURE: 82 MMHG | BODY MASS INDEX: 34.07 KG/M2 | WEIGHT: 230 LBS | SYSTOLIC BLOOD PRESSURE: 160 MMHG

## 2018-11-29 DIAGNOSIS — R35.0 FREQUENCY OF URINATION: Primary | ICD-10-CM

## 2018-11-29 DIAGNOSIS — N20.0 NEPHROLITHIASIS: ICD-10-CM

## 2018-11-29 LAB
SL AMB  POCT GLUCOSE, UA: NORMAL
SL AMB LEUKOCYTE ESTERASE,UA: NORMAL
SL AMB POCT BILIRUBIN,UA: NORMAL
SL AMB POCT BLOOD,UA: NORMAL
SL AMB POCT CLARITY,UA: CLEAR
SL AMB POCT COLOR,UA: YELLOW
SL AMB POCT KETONES,UA: NORMAL
SL AMB POCT NITRITE,UA: NORMAL
SL AMB POCT PH,UA: 5
SL AMB POCT SPECIFIC GRAVITY,UA: 1.02
SL AMB POCT URINE PROTEIN: NORMAL
SL AMB POCT UROBILINOGEN: 0.2

## 2018-11-29 PROCEDURE — 99213 OFFICE O/P EST LOW 20 MIN: CPT | Performed by: UROLOGY

## 2018-11-29 PROCEDURE — 81003 URINALYSIS AUTO W/O SCOPE: CPT | Performed by: UROLOGY

## 2018-11-29 RX ORDER — SIMVASTATIN 20 MG
20 TABLET ORAL
COMMUNITY
Start: 2018-11-05

## 2018-11-29 RX ORDER — TRAMADOL HYDROCHLORIDE 50 MG/1
50 TABLET ORAL 2 TIMES DAILY
COMMUNITY
Start: 2018-11-20

## 2018-11-29 RX ORDER — AZITHROMYCIN 250 MG/1
TABLET, FILM COATED ORAL
COMMUNITY
Start: 2018-11-27 | End: 2019-06-04 | Stop reason: ALTCHOICE

## 2018-11-29 RX ORDER — ALLOPURINOL 100 MG/1
100 TABLET ORAL DAILY
Qty: 90 TABLET | Refills: 3 | Status: SHIPPED | OUTPATIENT
Start: 2018-11-29 | End: 2020-01-07 | Stop reason: SDUPTHER

## 2018-11-29 NOTE — PROGRESS NOTES
[address] [facility]  [facility]  [address] [facility]  [place], [address] [place] [address]  [phone]  www  [facility]  org      NAME: Caitlin Bowman  AGE: 75 y o  SEX: male  : 1945   MRN: [id number]    DATE: 2018  TIME: 1:20 PM    Assessment and Plan:  Nocturia:  He can continue to take the oxybutynin extended release 5 mg at bedtime as needed  I would avoid going up on the dose because that could make it so he is unable to urinate at all  However he is having a major problem with an upper respiratory infection which is now just be coming cleared and I am hoping that his symptoms might finishes up his antibiotics and infection is out of his system  Follow-up in 3 months as scheduled with a PSA  Refilled prescriptions  Chief Complaint   No chief complaint on file  History of Present Illness   Recently seen in emergency department for right flank pain 10/31/2018:  CT scan showed no ureteral calculi, and there were small 2-3 mm nonobstructive stones seen in the right kidney  On allopurinol for this  100 mg daily  Prostate cancer on ADT windows:  Is due for recheck PSA in 3 months  Nocturia:  He is getting up more frequently at night and he has been taking oxybutynin 5 mg in the evening as needed  The following portions of the patient's history were reviewed and updated as appropriate: allergies, current medications, past family history, past medical history, past social history, past surgical history and problem list     Review of Systems   Review of Systems   Genitourinary: Positive for dysuria  Negative for hematuria  Active Problem List     Patient Active Problem List   Diagnosis    Malignant neoplasm of prostate ([address] )    Arthritis    Dysuria    Bladder pain    Prostate cancer [facility]    Increased urinary frequency       Objective   There were no vitals taken for this visit      Physical Exam        Current Medications Current Outpatient Prescriptions:     allopurinol (ZYLOPRIM) 100 mg tablet, Take 100 mg by mouth daily, Disp: , Rfl:     aspirin 81 MG tablet, Take 81 mg by mouth, Disp: , Rfl:     azelastine (ASTELIN) 0 1 % nasal spray, 2 sprays into each nostril 3 (three) times a day Use in each nostril as directed  , Disp: , Rfl:     B-D ULTRAFINE III SHORT PEN 31G X 8 MM MISC, , Disp: , Rfl:     bicalutamide (CASODEX) 50 mg tablet, Take 50 mg by mouth 3 (three) times a day, Disp: , Rfl:     clonazePAM (KlonoPIN) 0 5 mg tablet, Take 0 5 mg by mouth 3 (three) times a day, Disp: , Rfl:     diclofenac sodium (VOLTAREN) 1 %, Apply 2mg 2 times every day, Disp: , Rfl:     escitalopram (LEXAPRO) 20 mg tablet, Take 20 mg by mouth daily, Disp: , Rfl:     Fexofenadine HCl (MUCINEX ALLERGY PO), Take 1-2 tablets by mouth as needed  , Disp: , Rfl:     glimepiride (AMARYL) 2 mg tablet, Take 2 mg by mouth every morning before breakfast, Disp: , Rfl:     Hydrocodone-Acetaminophen (VICODIN PO), Take by mouth daily, Disp: , Rfl:     hydrocortisone (WESTCORT) 0 2 % cream, , Disp: , Rfl:     Liraglutide (VICTOZA SC), Inject under the skin daily, Disp: , Rfl:     LORazepam (ATIVAN) 0 5 mg tablet, Take 0 5 mg by mouth every 8 (eight) hours as needed for anxiety, Disp: , Rfl:     metFORMIN (GLUCOPHAGE) 500 mg tablet, Take 500 mg by mouth 3 (three) times a day with meals  , Disp: , Rfl:     montelukast (SINGULAIR) 10 mg tablet, Daily, Disp: , Rfl:     Multiple Vitamins-Minerals (MULTIVITAMIN ADULT PO), Take by mouth, Disp: , Rfl:     mupirocin (BACTROBAN) 2 % ointment, , Disp: , Rfl:     Omeprazole 20 MG TBEC, daily as needed  , Disp: , Rfl:     oxybutynin (DITROPAN-XL) 5 mg 24 hr tablet, Take 1 tablet (5 mg total) by mouth daily for 90 days, Disp: 90 tablet, Rfl: 3    SIMVASTATIN PO, Take 20 mg by mouth daily  , Disp: , Rfl:     TRAMADOL HCL PO, Take by mouth as needed, Disp: , Rfl:     VICTOZA injection, , Disp: , Rfl:    zolpidem (AMBIEN) 10 mg tablet, Take by mouth daily at bedtime, Disp: , Rfl:         Dali Kellogg MD

## 2018-11-29 NOTE — LETTER
2018     Jocelyn Sims, [address] [facility]    Patient: Alexandra Query   YOB: 1945   Date of Visit: 2018       Dear Dr Melissa Aleman:    Thank you for referring Win Borges to me for evaluation  Below are my notes for this consultation  If you have questions, please do not hesitate to call me  I look forward to following your patient along with you  Sincerely,        Tiffany Sy MD        CC: No Recipients  Tiffany Sy MD  2018  1:53 PM  Sign at close encounter  [address] [facility]  [address], [address] [place] [address]  [phone]  www  [facility]  org      NAME: Vanessa Bowman  AGE: 75 y o  SEX: male  : 1945   MRN: [id number]    DATE: 2018  TIME: 1:20 PM    Assessment and Plan:  Nocturia:  He can continue to take the oxybutynin extended release 5 mg at bedtime as needed  I would avoid going up on the dose because that could make it so he is unable to urinate at all  However he is having a major problem with an upper respiratory infection which is now just be coming cleared and I am hoping that his symptoms might finishes up his antibiotics and infection is out of his system  Follow-up in 3 months as scheduled with a PSA  Refilled prescriptions  Chief Complaint   No chief complaint on file  History of Present Illness   Recently seen in emergency department for right flank pain 10/31/2018:  CT scan showed no ureteral calculi, and there were small 2-3 mm nonobstructive stones seen in the right kidney  On allopurinol for this  100 mg daily  Prostate cancer on ADT windows:  Is due for recheck PSA in 3 months  Nocturia:  He is getting up more frequently at night and he has been taking oxybutynin 5 mg in the evening as needed      The following portions of the patient's history were reviewed and updated as appropriate: allergies, current medications, past family history, past medical history, past social history, past surgical history and problem list     Review of Systems   Review of Systems   Genitourinary: Positive for dysuria  Negative for hematuria  Active Problem List     Patient Active Problem List   Diagnosis    Malignant neoplasm of prostate ([facility] )    Arthritis    Dysuria    Bladder pain    [facility]    Increased urinary frequency       Objective   There were no vitals taken for this visit      Physical Exam        Current Medications     Current Outpatient Prescriptions:     allopurinol (ZYLOPRIM) 100 mg tablet, Take 100 mg by mouth daily, Disp: , Rfl:     aspirin 81 MG tablet, Take 81 mg by mouth, Disp: , Rfl:     azelastine (ASTELIN) 0 1 % nasal spray, 2 sprays into each nostril 3 (three) times a day Use in each nostril as directed  , Disp: , Rfl:     B-D ULTRAFINE III SHORT PEN 31G X 8 MM MISC, , Disp: , Rfl:     bicalutamide (CASODEX) 50 mg tablet, Take 50 mg by mouth 3 (three) times a day, Disp: , Rfl:     clonazePAM (KlonoPIN) 0 5 mg tablet, Take 0 5 mg by mouth 3 (three) times a day, Disp: , Rfl:     diclofenac sodium (VOLTAREN) 1 %, Apply 2mg 2 times every day, Disp: , Rfl:     escitalopram (LEXAPRO) 20 mg tablet, Take 20 mg by mouth daily, Disp: , Rfl:     Fexofenadine HCl (MUCINEX ALLERGY PO), Take 1-2 tablets by mouth as needed  , Disp: , Rfl:     glimepiride (AMARYL) 2 mg tablet, Take 2 mg by mouth every morning before breakfast, Disp: , Rfl:     Hydrocodone-Acetaminophen (VICODIN PO), Take by mouth daily, Disp: , Rfl:     hydrocortisone (WESTCORT) 0 2 % cream, , Disp: , Rfl:     Liraglutide (VICTOZA SC), Inject under the skin daily, Disp: , Rfl:     LORazepam (ATIVAN) 0 5 mg tablet, Take 0 5 mg by mouth every 8 (eight) hours as needed for anxiety, Disp: , Rfl:     metFORMIN (GLUCOPHAGE) 500 mg tablet, Take 500 mg by mouth 3 (three) times a day with meals  , Disp: , Rfl:     montelukast (SINGULAIR) 10 mg tablet, Daily, Disp: , Rfl:     Multiple Vitamins-Minerals (MULTIVITAMIN ADULT PO), Take by mouth, Disp: , Rfl:     mupirocin (BACTROBAN) 2 % ointment, , Disp: , Rfl:     Omeprazole 20 MG TBEC, daily as needed  , Disp: , Rfl:     oxybutynin (DITROPAN-XL) 5 mg 24 hr tablet, Take 1 tablet (5 mg total) by mouth daily for 90 days, Disp: 90 tablet, Rfl: 3    SIMVASTATIN PO, Take 20 mg by mouth daily  , Disp: , Rfl:     TRAMADOL HCL PO, Take by mouth as needed, Disp: , Rfl:     VICTOZA injection, , Disp: , Rfl:     zolpidem (AMBIEN) 10 mg tablet, Take by mouth daily at bedtime, Disp: , Rfl:         Rosa Mclaughlin MD

## 2019-01-10 ENCOUNTER — TELEPHONE (OUTPATIENT)
Dept: UROLOGY | Facility: MEDICAL CENTER | Age: 74
End: 2019-01-10

## 2019-01-10 NOTE — TELEPHONE ENCOUNTER
Pt called asking if he could get psa checked sooner than March due to having frequency? [facility] [address]  [phone]

## 2019-01-10 NOTE — TELEPHONE ENCOUNTER
Called and l/m that Dr Gerson Haley said in his note he is to have the PSA done in 02-19  Also Dr Gerson Haley is aware of the frequency of urination  Suggested he avoid bladder stimulants in the meantime  Dr Gerson Haley doesn't want to increase Oxybutynin d/t risk for urinary retention

## 2019-02-08 ENCOUNTER — TELEPHONE (OUTPATIENT)
Dept: UROLOGY | Facility: AMBULATORY SURGERY CENTER | Age: 74
End: 2019-02-08

## 2019-02-08 NOTE — TELEPHONE ENCOUNTER
No new orders at this time  Please ensure that he follows up with his PCP based on the fact that he has had an increase with his metformin but also in the process of taking Victoza for his diabetes  Encourage increasing p o   Fluid intake

## 2019-03-05 ENCOUNTER — OFFICE VISIT (OUTPATIENT)
Dept: UROLOGY | Facility: MEDICAL CENTER | Age: 74
End: 2019-03-05
Payer: MEDICARE

## 2019-03-05 VITALS
BODY MASS INDEX: 35.25 KG/M2 | SYSTOLIC BLOOD PRESSURE: 144 MMHG | HEIGHT: 69 IN | HEART RATE: 74 BPM | DIASTOLIC BLOOD PRESSURE: 80 MMHG | WEIGHT: 238 LBS

## 2019-03-05 DIAGNOSIS — R35.0 FREQUENCY OF URINATION: Primary | ICD-10-CM

## 2019-03-05 DIAGNOSIS — C61 PROSTATE CANCER (HCC): ICD-10-CM

## 2019-03-05 LAB
SL AMB  POCT GLUCOSE, UA: NEGATIVE
SL AMB LEUKOCYTE ESTERASE,UA: NEGATIVE
SL AMB POCT BILIRUBIN,UA: NEGATIVE
SL AMB POCT BLOOD,UA: NEGATIVE
SL AMB POCT CLARITY,UA: CLEAR
SL AMB POCT COLOR,UA: YELLOW
SL AMB POCT KETONES,UA: NORMAL
SL AMB POCT NITRITE,UA: NEGATIVE
SL AMB POCT PH,UA: 5
SL AMB POCT SPECIFIC GRAVITY,UA: >=1.03
SL AMB POCT URINE PROTEIN: NEGATIVE
SL AMB POCT UROBILINOGEN: 0.2

## 2019-03-05 PROCEDURE — 81003 URINALYSIS AUTO W/O SCOPE: CPT | Performed by: UROLOGY

## 2019-03-05 PROCEDURE — 99213 OFFICE O/P EST LOW 20 MIN: CPT | Performed by: UROLOGY

## 2019-03-05 NOTE — PROGRESS NOTES
[address] [facility]  [facility]  [address] [facility], [address] [place] [address]  [phone]  www  [facility]  org      NAME: Taisha Bowman  AGE: 75 y o  SEX: male  : 1945   MRN: [id number]    DATE: 3/5/2019  TIME: 4:04 PM    Assessment and Plan:    CAP- recheck PSA 6 months  Continue intermittent ADT based on PSA  Right renal calculi- small, no need to treat  Chief Complaint   No chief complaint on file  History of Present Illness   Prostate Cancer- on intermittent ADT  Here for PSA f/u- PSA up to 4 2-it was 3 1 2018  Nocturia is down to 2-3 times a night  He still has daytime frequency  He continues with allopurinol  Nephrolithiasis- CT 10/2018 showed only 2-3 mm stones in right kidney, nothing major  The following portions of the patient's history were reviewed and updated as appropriate: allergies, current medications, past family history, past medical history, past social history, past surgical history and problem list     Review of Systems   Review of Systems   Genitourinary: Positive for frequency         Active Problem List     Patient Active Problem List   Diagnosis    Malignant neoplasm of prostate (HCC)    Arthritis    Dysuria    Bladder pain    Prostate cancer ([address] )    Increased urinary frequency       Objective   /80   Pulse 74   Ht 5' 9" (1 753 m)   Wt 108 kg (238 lb)   BMI 35 15 kg/m²     Physical Exam        Current Medications     Current Outpatient Medications:     allopurinol (ZYLOPRIM) 100 mg tablet, Take 1 tablet (100 mg total) by mouth daily, Disp: 90 tablet, Rfl: 3    aspirin 81 MG tablet, Take 81 mg by mouth, Disp: , Rfl:     azelastine (ASTELIN) 0 1 % nasal spray, 2 sprays into each nostril 3 (three) times a day Use in each nostril as directed  , Disp: , Rfl:     B-D ULTRAFINE III SHORT PEN 31G X 8 MM MISC, , Disp: , Rfl:     clonazePAM (KlonoPIN) 0 5 mg tablet, Take 0 5 mg by mouth 3 (three) times a day, Disp: , Rfl:     diclofenac sodium (VOLTAREN) 1 %, Apply 2mg 2 times every day, Disp: , Rfl:     escitalopram (LEXAPRO) 20 mg tablet, Take 20 mg by mouth daily, Disp: , Rfl:     Fexofenadine HCl (MUCINEX ALLERGY PO), Take 1-2 tablets by mouth as needed  , Disp: , Rfl:     glimepiride (AMARYL) 2 mg tablet, Take 2 mg by mouth every morning before breakfast, Disp: , Rfl:     Hydrocodone-Acetaminophen (VICODIN PO), Take by mouth daily, Disp: , Rfl:     hydrocortisone (WESTCORT) 0 2 % cream, , Disp: , Rfl:     Liraglutide (VICTOZA SC), Inject under the skin daily, Disp: , Rfl:     LORazepam (ATIVAN) 0 5 mg tablet, Take 0 5 mg by mouth every 8 (eight) hours as needed for anxiety, Disp: , Rfl:     metFORMIN (GLUCOPHAGE) 500 mg tablet, Take 500 mg by mouth 3 (three) times a day with meals  , Disp: , Rfl:     montelukast (SINGULAIR) 10 mg tablet, Daily, Disp: , Rfl:     Multiple Vitamins-Minerals (MULTIVITAMIN ADULT PO), Take by mouth, Disp: , Rfl:     mupirocin (BACTROBAN) 2 % nasal ointment, into each nostril, Disp: , Rfl:     Omeprazole 20 MG TBEC, daily as needed  , Disp: , Rfl:     oxybutynin (DITROPAN-XL) 5 mg 24 hr tablet, Take 1 tablet (5 mg total) by mouth daily for 90 days, Disp: 90 tablet, Rfl: 3    simvastatin (ZOCOR) 20 mg tablet, , Disp: , Rfl:     traMADol (ULTRAM) 50 mg tablet, , Disp: , Rfl:     VICTOZA injection, , Disp: , Rfl:     zolpidem (AMBIEN) 10 mg tablet, Take by mouth daily at bedtime, Disp: , Rfl:     azithromycin (ZITHROMAX) 250 mg tablet, Take 2 tablets by mouth on day one; then one tablet daily for 4 days   Off for 5 days and then repeat , Disp: , Rfl:   Time- 15 minutes      Leonidas Walsh MD

## 2019-06-04 ENCOUNTER — OFFICE VISIT (OUTPATIENT)
Dept: UROLOGY | Facility: MEDICAL CENTER | Age: 74
End: 2019-06-04
Payer: MEDICARE

## 2019-06-04 VITALS
HEART RATE: 73 BPM | BODY MASS INDEX: 33.18 KG/M2 | SYSTOLIC BLOOD PRESSURE: 130 MMHG | WEIGHT: 224 LBS | HEIGHT: 69 IN | DIASTOLIC BLOOD PRESSURE: 82 MMHG

## 2019-06-04 DIAGNOSIS — R35.0 FREQUENCY OF URINATION: ICD-10-CM

## 2019-06-04 DIAGNOSIS — C61 PROSTATE CANCER (HCC): Primary | ICD-10-CM

## 2019-06-04 DIAGNOSIS — G89.29 CHRONIC BILATERAL LOW BACK PAIN WITHOUT SCIATICA: ICD-10-CM

## 2019-06-04 DIAGNOSIS — M54.50 CHRONIC BILATERAL LOW BACK PAIN WITHOUT SCIATICA: ICD-10-CM

## 2019-06-04 LAB
POST-VOID RESIDUAL VOLUME, ML POC: 45 ML
SL AMB  POCT GLUCOSE, UA: ABNORMAL
SL AMB LEUKOCYTE ESTERASE,UA: ABNORMAL
SL AMB POCT BILIRUBIN,UA: ABNORMAL
SL AMB POCT BLOOD,UA: ABNORMAL
SL AMB POCT CLARITY,UA: CLEAR
SL AMB POCT COLOR,UA: YELLOW
SL AMB POCT KETONES,UA: ABNORMAL
SL AMB POCT NITRITE,UA: ABNORMAL
SL AMB POCT PH,UA: 5.5
SL AMB POCT SPECIFIC GRAVITY,UA: 1.02
SL AMB POCT URINE PROTEIN: ABNORMAL
SL AMB POCT UROBILINOGEN: 0.2

## 2019-06-04 PROCEDURE — 81003 URINALYSIS AUTO W/O SCOPE: CPT | Performed by: UROLOGY

## 2019-06-04 PROCEDURE — 99214 OFFICE O/P EST MOD 30 MIN: CPT | Performed by: UROLOGY

## 2019-06-04 PROCEDURE — 51798 US URINE CAPACITY MEASURE: CPT | Performed by: UROLOGY

## 2019-06-04 RX ORDER — DULOXETIN HYDROCHLORIDE 60 MG/1
CAPSULE, DELAYED RELEASE ORAL
COMMUNITY
End: 2020-01-07 | Stop reason: ALTCHOICE

## 2019-06-04 RX ORDER — BACLOFEN 10 MG/1
TABLET ORAL
COMMUNITY

## 2019-06-04 RX ORDER — BICALUTAMIDE 50 MG/1
TABLET, FILM COATED ORAL
COMMUNITY
End: 2020-01-07 | Stop reason: ALTCHOICE

## 2019-06-04 RX ORDER — SOLIFENACIN SUCCINATE 10 MG/1
10 TABLET, FILM COATED ORAL DAILY
Qty: 30 TABLET | Refills: 1 | Status: SHIPPED | OUTPATIENT
Start: 2019-06-04 | End: 2019-06-04 | Stop reason: CLARIF

## 2019-06-04 RX ORDER — SOLIFENACIN SUCCINATE 10 MG/1
10 TABLET, FILM COATED ORAL DAILY
Qty: 30 TABLET | Refills: 1 | Status: SHIPPED | OUTPATIENT
Start: 2019-06-04 | End: 2019-09-04 | Stop reason: SDUPTHER

## 2019-06-04 RX ORDER — FLECAINIDE ACETATE 50 MG/1
50 TABLET ORAL EVERY 12 HOURS
COMMUNITY

## 2019-06-04 RX ORDER — DILTIAZEM HYDROCHLORIDE EXTENDED-RELEASE TABLETS 240 MG/1
TABLET, EXTENDED RELEASE ORAL DAILY
COMMUNITY
End: 2020-01-07 | Stop reason: ALTCHOICE

## 2019-07-16 ENCOUNTER — OFFICE VISIT (OUTPATIENT)
Dept: UROLOGY | Facility: MEDICAL CENTER | Age: 74
End: 2019-07-16
Payer: MEDICARE

## 2019-07-16 VITALS
BODY MASS INDEX: 31.84 KG/M2 | SYSTOLIC BLOOD PRESSURE: 138 MMHG | WEIGHT: 215 LBS | HEART RATE: 77 BPM | HEIGHT: 69 IN | DIASTOLIC BLOOD PRESSURE: 78 MMHG

## 2019-07-16 DIAGNOSIS — C61 PROSTATE CANCER (HCC): Primary | ICD-10-CM

## 2019-07-16 PROCEDURE — 99214 OFFICE O/P EST MOD 30 MIN: CPT | Performed by: UROLOGY

## 2019-07-16 PROCEDURE — 81003 URINALYSIS AUTO W/O SCOPE: CPT | Performed by: UROLOGY

## 2019-07-16 RX ORDER — KETOCONAZOLE 20 MG/G
CREAM TOPICAL
COMMUNITY
Start: 2019-07-13

## 2019-07-16 NOTE — PROGRESS NOTES
[address] [facility]  [facility]  [address] [facility]  [place], [address] [facility]  [phone]  www  [facility]  org      NAME: Gin Bowman  AGE: 75 y o  SEX: male  : 1945   MRN: [id number]    DATE: 2019  TIME: 12:03 PM    Assessment and Plan:    Prostate cancer on intermittent androgen deprivation therapy:  No need for Lupron at this time  Follow-up 6 months with PSA  Overactive bladder, urgency frequency and nocturia:  Doing well VESIcare, continue this  Chief Complaint   No chief complaint on file  History of Present Illness     Prostate cancer: On intermittent ADT:  PSA is 4 84 6/10/2019, testosterone is 122  Last PSA was 4 20 2019  Urinary frequency, urgency and nocturia:  Chronic problem, PVR was 45 cc 1 month ago with Dr Marsha Duncan  He stopped his oxybutynin at that time and started VESIcare 10 mg daily  VESIcare reduce his nocturia 1 time a night  Right nephrolithiasis:  Had tiny stones in his right kidney on CT scan 10/31/2018      The following portions of the patient's history were reviewed and updated as appropriate: allergies, current medications, past family history, past medical history, past social history, past surgical history and problem list   Past Medical History:   Diagnosis Date    Arthritis     Chronic bladder pain 2017    Chronic pain     Diabetes mellitus ([address] )     Elevated PSA     Frequency of urination     Hematuria, gross 06/15/2017    Hypercholesterolemia     Hyperlipidemia     Kidney stones 2016    Lymphoma ([facility]     Malignant neoplasm of prostate ([facility] ) 2017    Pain in hip     Skin cancer     Sleep apnea      Past Surgical History:   Procedure Laterality Date    CYSTOSCOPY  2017    ID CYSTOURETHROSCOPY,URETER CATHETER Bilateral 2018    Procedure: CYSTOSCOPY,  b/l retrogrades pyelogram;  Surgeon: Pavel Echevarria MD;  Location: [facility]; Service: Urology    PROSTATE BIOPSY  04/06/2017    SINUS SURGERY  2010    SKIN CANCER EXCISION       shoulder  Review of Systems   Review of Systems    Active Problem List     Patient Active Problem List   Diagnosis    Malignant [facility]    Arthritis    Dysuria    Bladder pain    Prostate cancer ([facility]    Increased urinary frequency       Objective   There were no vitals taken for this visit  Physical Exam   Constitutional: He is oriented to person, place, and time  He appears well-developed and well-nourished  HENT:   Head: Normocephalic and atraumatic  Eyes: EOM are normal    Neck: Normal range of motion  Pulmonary/Chest: Effort normal    Musculoskeletal: Normal range of motion  Neurological: He is alert and oriented to person, place, and time  Skin: Skin is warm and dry  Psychiatric: He has a normal mood and affect  His behavior is normal  Judgment and thought content normal            Current Medications     Current Outpatient Medications:     allopurinol (ZYLOPRIM) 100 mg tablet, Take 1 tablet (100 mg total) by mouth daily, Disp: 90 tablet, Rfl: 3    aspirin 81 MG tablet, Take 81 mg by mouth, Disp: , Rfl:     azelastine (ASTELIN) 0 1 % nasal spray, 2 sprays into each nostril 3 (three) times a day Use in each nostril as directed  , Disp: , Rfl:     B-D ULTRAFINE III SHORT PEN 31G X 8 MM MISC, , Disp: , Rfl:     baclofen 10 mg tablet, baclofen 10 mg tablet, Disp: , Rfl:     bicalutamide (CASODEX) 50 mg tablet, bicalutamide 50 mg tablet, Disp: , Rfl:     clonazePAM (KlonoPIN) 0 5 mg tablet, Take 0 5 mg by mouth 3 (three) times a day, Disp: , Rfl:     diclofenac sodium (VOLTAREN) 1 %, Apply 2mg 2 times every day, Disp: , Rfl:     diltiazem (CARDIZEM LA) 240 MG 24 hr tablet, Daily, Disp: , Rfl:     DULoxetine (CYMBALTA) 60 mg delayed release capsule, Cymbalta 60 mg capsule,delayed release  Take 1 capsule (60mg) by mouth once daily  , Disp: , Rfl:     escitalopram (LEXAPRO) 20 mg tablet, Take 20 mg by mouth daily, Disp: , Rfl:     Fexofenadine HCl (MUCINEX ALLERGY PO), Take 1-2 tablets by mouth as needed  , Disp: , Rfl:     flecainide (TAMBOCOR) 100 mg tablet, Every 12 hours, Disp: , Rfl:     glimepiride (AMARYL) 2 mg tablet, Take 2 mg by mouth every morning before breakfast, Disp: , Rfl:     Hydrocodone-Acetaminophen (VICODIN PO), Take by mouth daily, Disp: , Rfl:     hydrocortisone (WESTCORT) 0 2 % cream, , Disp: , Rfl:     Liraglutide (VICTOZA SC), Inject under the skin daily, Disp: , Rfl:     LORazepam (ATIVAN) 0 5 mg tablet, Take 0 5 mg by mouth every 8 (eight) hours as needed for anxiety, Disp: , Rfl:     metFORMIN (GLUCOPHAGE) 500 mg tablet, Take 500 mg by mouth 3 (three) times a day with meals  , Disp: , Rfl:     montelukast (SINGULAIR) 10 mg tablet, Daily, Disp: , Rfl:     Multiple Vitamins-Minerals (MULTIVITAMIN ADULT PO), Take by mouth, Disp: , Rfl:     mupirocin (BACTROBAN) 2 % nasal ointment, into each nostril, Disp: , Rfl:     Omeprazole 20 MG TBEC, daily as needed  , Disp: , Rfl:     simvastatin (ZOCOR) 20 mg tablet, , Disp: , Rfl:     solifenacin (VESICARE) 10 MG tablet, Take 1 tablet (10 mg total) by mouth daily, Disp: 30 tablet, Rfl: 1    traMADol (ULTRAM) 50 mg tablet, , Disp: , Rfl:     VICTOZA injection, , Disp: , Rfl:     zolpidem (AMBIEN) 10 mg tablet, Take by mouth daily at bedtime, Disp: , Rfl:         Ana Alcala MD

## 2019-08-20 LAB — HBA1C MFR BLD HPLC: 7.1 %

## 2019-09-04 DIAGNOSIS — R35.0 FREQUENCY OF URINATION: ICD-10-CM

## 2019-09-04 RX ORDER — SOLIFENACIN SUCCINATE 10 MG/1
10 TABLET, FILM COATED ORAL DAILY
Qty: 90 TABLET | Refills: 3 | Status: SHIPPED | OUTPATIENT
Start: 2019-09-04 | End: 2020-01-07 | Stop reason: ALTCHOICE

## 2019-09-04 NOTE — TELEPHONE ENCOUNTER
Patient called the office to cancel an office visit and then requested a medication refill while on the line  He's requesting a new prescription for Solifenacin (VESICARE) 10mg, 90 day supply to [facility] mail order pharmacy  The patient was last seen on 7/16/19 by Dr Francisco Bello in the [facility] location; continuation of the medication was authorized at that time    Request for same, 90 day supply with 3 refills was queued and forwarded to KARRIE wSanson for approval

## 2019-09-11 ENCOUNTER — TELEPHONE (OUTPATIENT)
Dept: UROLOGY | Facility: MEDICAL CENTER | Age: 74
End: 2019-09-11

## 2019-09-11 DIAGNOSIS — C61 PROSTATE CANCER (HCC): Primary | ICD-10-CM

## 2019-09-11 NOTE — TELEPHONE ENCOUNTER
Spoke with pt, PSA order has expected date of 1/2020, so lab will not draw it    I placed new order for pt to have done before he comes in 10/1/19

## 2019-09-11 NOTE — TELEPHONE ENCOUNTER
Pt managed by Dr Simon [place] called regarding lab order for psa I informed him the order he was given is for 6 month follow up,he had called previously and scheduled appointment 10/01/19 he states something is not right and he should have psa checked for this appointment I told him Dr Simon would be notified of request

## 2019-10-01 ENCOUNTER — OFFICE VISIT (OUTPATIENT)
Dept: UROLOGY | Facility: MEDICAL CENTER | Age: 74
End: 2019-10-01
Payer: MEDICARE

## 2019-10-01 VITALS
WEIGHT: 215 LBS | HEIGHT: 69 IN | HEART RATE: 71 BPM | BODY MASS INDEX: 31.84 KG/M2 | SYSTOLIC BLOOD PRESSURE: 130 MMHG | DIASTOLIC BLOOD PRESSURE: 80 MMHG

## 2019-10-01 DIAGNOSIS — R35.1 NOCTURIA: ICD-10-CM

## 2019-10-01 DIAGNOSIS — R35.0 URINARY FREQUENCY: ICD-10-CM

## 2019-10-01 DIAGNOSIS — C61 PROSTATE CANCER (HCC): Primary | ICD-10-CM

## 2019-10-01 PROCEDURE — 99214 OFFICE O/P EST MOD 30 MIN: CPT | Performed by: UROLOGY

## 2019-10-01 NOTE — PROGRESS NOTES
[address] [facility]  [facility]  [address] [facility]  [place], [address] [facility]  [phone]  www  [facility]  org      NAME: Xiomy Bowman  AGE: 75 y o  SEX: male  : 1945   MRN: [id number]    DATE: 10/1/2019  TIME: 1:17 PM    Assessment and Plan:    CAP on Intermittent ADT- no Lupron now, have PSA checked prior to his 2020 appt  Nocturia overactive bladder:  Continue with VESIcare  History of uric acid nephrolithiasis:  Continue with allopurinol  His  history of lymphoma may have something to do this  Chief Complaint   No chief complaint on file  History of Present Illness   Prostate cancer on intermittent androgen deprivation therapy:  I had scheduled him for have another PSA done in 2020, however he wants it checked earlier  PSA 6 72 as of 2019, and was 4   It was 4 20 2019  Nocturia:  He continues with VESIcare  He is doing well in this regard  History of renal calculi and ureteral calculi:  On allopurinol for prevention of this 100 mg daily      The following portions of the patient's history were reviewed and updated as appropriate: allergies, current medications, past family history, past medical history, past social history, past surgical history and problem list   Past Medical History:   Diagnosis Date    Arthritis     Chronic bladder pain 2017    Chronic pain     Diabetes mellitus ([address] )     Elevated PSA     Frequency of urination     Hematuria, gross 06/15/2017    Hypercholesterolemia     Hyperlipidemia     Kidney stones 2016    Lymphoma ([address] )     Malignant neoplasm of prostate ([address] ) 2017    Pain in hip     Skin cancer     Sleep apnea      Past Surgical History:   Procedure Laterality Date    CYSTOSCOPY  2017    [place] CYSTOURETHROSCOPY,URETER CATHETER Bilateral 2018    Procedure: CYSTOSCOPY,  b/l retrogrades pyelogram;  Surgeon: Cally Brooke Enrique Barrera MD;  Location: [facility] OR;  Service: Urology    PROSTATE BIOPSY  04/06/2017    SINUS SURGERY  2010    SKIN CANCER EXCISION       shoulder  Review of Systems   Review of Systems    Active Problem List     Patient Active Problem List   Diagnosis    Malignant ne[facility]    Arthritis    Dysuria    Bladder pain    Prostate cancer ([facility] )    Increased urinary frequency       Objective   There were no vitals taken for this visit  Physical Exam   Constitutional: He is oriented to person, place, and time  He appears well-developed and well-nourished  HENT:   Head: Normocephalic and atraumatic  Eyes: EOM are normal    Neck: Normal range of motion  Pulmonary/Chest: Effort normal    Musculoskeletal: Normal range of motion  Neurological: He is alert and oriented to person, place, and time  Skin: Skin is warm and dry  Psychiatric: He has a normal mood and affect   His behavior is normal  Judgment and thought content normal            Current Medications     Current Outpatient Medications:     allopurinol (ZYLOPRIM) 100 mg tablet, Take 1 tablet (100 mg total) by mouth daily, Disp: 90 tablet, Rfl: 3    aspirin 81 MG tablet, Take 81 mg by mouth, Disp: , Rfl:     azelastine (ASTELIN) 0 1 % nasal spray, 2 sprays into each nostril 3 (three) times a day Use in each nostril as directed  , Disp: , Rfl:     B-D ULTRAFINE III SHORT PEN 31G X 8 MM MISC, , Disp: , Rfl:     baclofen 10 mg tablet, baclofen 10 mg tablet, Disp: , Rfl:     bicalutamide (CASODEX) 50 mg tablet, bicalutamide 50 mg tablet, Disp: , Rfl:     clonazePAM (KlonoPIN) 0 5 mg tablet, Take 0 5 mg by mouth 3 (three) times a day, Disp: , Rfl:     diclofenac sodium (VOLTAREN) 1 %, Apply 2mg 2 times every day, Disp: , Rfl:     diltiazem (CARDIZEM LA) 240 MG 24 hr tablet, Daily, Disp: , Rfl:     DULoxetine (CYMBALTA) 60 mg delayed release capsule, Cymbalta 60 mg capsule,delayed release  Take 1 capsule (60mg) by mouth once daily , Disp: , Rfl:     escitalopram (LEXAPRO) 20 mg tablet, Take 20 mg by mouth daily, Disp: , Rfl:     Fexofenadine HCl (MUCINEX ALLERGY PO), Take 1-2 tablets by mouth as needed  , Disp: , Rfl:     flecainide (TAMBOCOR) 100 mg tablet, Every 12 hours, Disp: , Rfl:     glimepiride (AMARYL) 2 mg tablet, Take 2 mg by mouth every morning before breakfast, Disp: , Rfl:     Hydrocodone-Acetaminophen (VICODIN PO), Take by mouth daily, Disp: , Rfl:     hydrocortisone (WESTCORT) 0 2 % cream, , Disp: , Rfl:     hydrocortisone 2 5 % cream, , Disp: , Rfl:     ketoconazole (NIZORAL) 2 % cream, , Disp: , Rfl:     Liraglutide (VICTOZA SC), Inject under the skin daily, Disp: , Rfl:     LORazepam (ATIVAN) 0 5 mg tablet, Take 0 5 mg by mouth every 8 (eight) hours as needed for anxiety, Disp: , Rfl:     metFORMIN (GLUCOPHAGE) 1000 MG tablet, , Disp: , Rfl:     metFORMIN (GLUCOPHAGE) 500 mg tablet, Take 500 mg by mouth 3 (three) times a day with meals  , Disp: , Rfl:     montelukast (SINGULAIR) 10 mg tablet, Daily, Disp: , Rfl:     Multiple Vitamins-Minerals (MULTIVITAMIN ADULT PO), Take by mouth, Disp: , Rfl:     mupirocin (BACTROBAN) 2 % nasal ointment, into each nostril, Disp: , Rfl:     Omeprazole 20 MG TBEC, daily as needed  , Disp: , Rfl:     simvastatin (ZOCOR) 20 mg tablet, , Disp: , Rfl:     solifenacin (VESICARE) 10 MG tablet, Take 1 tablet (10 mg total) by mouth daily, Disp: 90 tablet, Rfl: 3    traMADol (ULTRAM) 50 mg tablet, , Disp: , Rfl:     VICTOZA injection, , Disp: , Rfl:     zolpidem (AMBIEN) 10 mg tablet, Take by mouth daily at bedtime, Disp: , Rfl:         Garrick River MD

## 2020-01-07 ENCOUNTER — OFFICE VISIT (OUTPATIENT)
Dept: UROLOGY | Facility: MEDICAL CENTER | Age: 75
End: 2020-01-07
Payer: MEDICARE

## 2020-01-07 VITALS
BODY MASS INDEX: 32.58 KG/M2 | HEIGHT: 69 IN | DIASTOLIC BLOOD PRESSURE: 68 MMHG | WEIGHT: 220 LBS | HEART RATE: 85 BPM | SYSTOLIC BLOOD PRESSURE: 122 MMHG

## 2020-01-07 DIAGNOSIS — R35.0 URINARY FREQUENCY: ICD-10-CM

## 2020-01-07 DIAGNOSIS — C61 PROSTATE CANCER (HCC): Primary | ICD-10-CM

## 2020-01-07 DIAGNOSIS — R35.0 FREQUENCY OF URINATION: ICD-10-CM

## 2020-01-07 DIAGNOSIS — N20.0 NEPHROLITHIASIS: ICD-10-CM

## 2020-01-07 LAB
SL AMB  POCT GLUCOSE, UA: NORMAL
SL AMB LEUKOCYTE ESTERASE,UA: NORMAL
SL AMB POCT BILIRUBIN,UA: NORMAL
SL AMB POCT BLOOD,UA: NORMAL
SL AMB POCT CLARITY,UA: CLEAR
SL AMB POCT COLOR,UA: YELLOW
SL AMB POCT KETONES,UA: NORMAL
SL AMB POCT NITRITE,UA: NORMAL
SL AMB POCT PH,UA: 5.5
SL AMB POCT SPECIFIC GRAVITY,UA: 1.01
SL AMB POCT URINE PROTEIN: NORMAL
SL AMB POCT UROBILINOGEN: 0.2

## 2020-01-07 PROCEDURE — 81003 URINALYSIS AUTO W/O SCOPE: CPT | Performed by: UROLOGY

## 2020-01-07 PROCEDURE — 99214 OFFICE O/P EST MOD 30 MIN: CPT | Performed by: UROLOGY

## 2020-01-07 RX ORDER — SOLIFENACIN SUCCINATE 10 MG/1
10 TABLET, FILM COATED ORAL
Qty: 90 TABLET | Refills: 3 | Status: SHIPPED | OUTPATIENT
Start: 2020-01-07 | End: 2020-08-27 | Stop reason: SDUPTHER

## 2020-01-07 RX ORDER — ALLOPURINOL 100 MG/1
100 TABLET ORAL DAILY
Qty: 90 TABLET | Refills: 3 | Status: SHIPPED | OUTPATIENT
Start: 2020-01-07 | End: 2021-02-19 | Stop reason: SDUPTHER

## 2020-01-07 NOTE — PROGRESS NOTES
[address] [facility]  [facility]  [address] [facility]  [place], [address] [facility]  [phone]  www  [facility]  org      NAME: Selvin Bowman  AGE: 75 y o  SEX: male  : 1945   MRN: [id number]    DATE: 2020  TIME: 1:30 PM    Assessment and Plan:    Prostate CA- stable PSA, recheck in 6 months, on intermittent ADT  Uric acid calculi- stay on Allopurinol, Rx refilled  Nocturia- doing well on Vesicare, rx refilled  Chief Complaint     Chief Complaint   Patient presents with    Prostate Cancer ([facility])    Urinary Frequency       History of Present Illness   Prostate cancer: On intermittent androgen deprivation therapy  PSA is 4 85 as of 2019  This is down from 6 72 3 months ago  It was 4 8 2019, and 4 20 on 2019  Nocturia:  On VESIcare  Twice a night nocturia  Doing well in this regard  History of uric acid nephrolithiasis:  On allopurinol  Urinalysis is good with pH 5 5        The following portions of the patient's history were reviewed and updated as appropriate: allergies, current medications, past family history, past medical history, past social history, past surgical history and problem list   Past Medical History:   Diagnosis Date    Arthritis     Chronic bladder pain 2017    Chronic pain     Diabetes mellitus ([address] )     Elevated PSA     Frequency of urination     Hematuria, gross 06/15/2017    Hypercholesterolemia     Hyperlipidemia     Kidney stones 2016    Lymphoma ([address] )     Malignant neoplasm of prostate ([address] ) 2017    Pain in hip     Skin cancer     Sleep apnea      Past Surgical History:   Procedure Laterality Date    CYSTOSCOPY  2017    [place] CYSTOURETHROSCOPY,URETER CATHETER Bilateral 2018    Procedure: CYSTOSCOPY,  b/l retrogrades pyelogram;  Surgeon: Alexandre Quiroz MD;  Location: [facility];  Service: Urology    PROSTATE BIOPSY  2017    SINUS SURGERY  2010    SKIN CANCER EXCISION       shoulder  Review of Systems   Review of Systems   Constitutional: Negative  Gastrointestinal: Positive for constipation  Genitourinary: Negative  Active Problem List     Patient Active Problem List   Diagnosis    Malignant ne[facility] of [facility] ([facility] )    Arthritis    Dysuria    Bladder pain    Prostate cancer ([facility] )    Increased urinary frequency       Objective   /68   Pulse 85   Ht 5' 9" (1 753 m)   Wt 99 8 kg (220 lb)   BMI 32 49 kg/m²     Physical Exam   Constitutional: He is oriented to person, place, and time  He appears well-developed and well-nourished  HENT:   Head: Normocephalic and atraumatic  Eyes: EOM are normal    Neck: Normal range of motion  Pulmonary/Chest: Effort normal    Musculoskeletal: Normal range of motion  Neurological: He is alert and oriented to person, place, and time  Skin: Skin is warm and dry  Psychiatric: He has a normal mood and affect   His behavior is normal  Judgment and thought content normal            Current Medications     Current Outpatient Medications:     allopurinol (ZYLOPRIM) 100 mg tablet, Take 1 tablet (100 mg total) by mouth daily, Disp: 90 tablet, Rfl: 3    aspirin 81 MG tablet, Take 81 mg by mouth, Disp: , Rfl:     azelastine (ASTELIN) 0 1 % nasal spray, 2 sprays into each nostril 3 (three) times a day Use in each nostril as directed  , Disp: , Rfl:     B-D ULTRAFINE III SHORT PEN 31G X 8 MM MISC, , Disp: , Rfl:     baclofen 10 mg tablet, baclofen 10 mg tablet, Disp: , Rfl:     bicalutamide (CASODEX) 50 mg tablet, bicalutamide 50 mg tablet, Disp: , Rfl:     clonazePAM (KlonoPIN) 0 5 mg tablet, Take 0 5 mg by mouth 3 (three) times a day, Disp: , Rfl:     diclofenac sodium (VOLTAREN) 1 %, Apply 2mg 2 times every day, Disp: , Rfl:     diltiazem (CARDIZEM LA) 240 MG 24 hr tablet, Daily, Disp: , Rfl:     DULoxetine (CYMBALTA) 60 mg delayed release capsule, Cymbalta 60 mg capsule,delayed release  Take 1 capsule (60mg) by mouth once daily  , Disp: , Rfl:     escitalopram (LEXAPRO) 20 mg tablet, Take 20 mg by mouth daily, Disp: , Rfl:     Fexofenadine HCl (MUCINEX ALLERGY PO), Take 1-2 tablets by mouth as needed  , Disp: , Rfl:     flecainide (TAMBOCOR) 100 mg tablet, Every 12 hours, Disp: , Rfl:     glimepiride (AMARYL) 2 mg tablet, Take 4 mg by mouth every morning before breakfast , Disp: , Rfl:     Hydrocodone-Acetaminophen (VICODIN PO), Take by mouth daily, Disp: , Rfl:     hydrocortisone (WESTCORT) 0 2 % cream, , Disp: , Rfl:     hydrocortisone 2 5 % cream, , Disp: , Rfl:     ketoconazole (NIZORAL) 2 % cream, , Disp: , Rfl:     Liraglutide (VICTOZA SC), Inject under the skin daily, Disp: , Rfl:     LORazepam (ATIVAN) 0 5 mg tablet, Take 0 5 mg by mouth every 8 (eight) hours as needed for anxiety, Disp: , Rfl:     metFORMIN (GLUCOPHAGE) 1000 MG tablet, , Disp: , Rfl:     metFORMIN (GLUCOPHAGE) 500 mg tablet, Take 500 mg by mouth 2 (two) times a day with meals , Disp: , Rfl:     montelukast (SINGULAIR) 10 mg tablet, Daily, Disp: , Rfl:     Multiple Vitamins-Minerals (MULTIVITAMIN ADULT PO), Take by mouth, Disp: , Rfl:     mupirocin (BACTROBAN) 2 % nasal ointment, into each nostril, Disp: , Rfl:     Omeprazole 20 MG TBEC, daily as needed  , Disp: , Rfl:     simvastatin (ZOCOR) 20 mg tablet, , Disp: , Rfl:     solifenacin (VESICARE) 10 MG tablet, Take 1 tablet (10 mg total) by mouth daily (Patient not taking: Reported on 10/1/2019), Disp: 90 tablet, Rfl: 3    traMADol (ULTRAM) 50 mg tablet, , Disp: , Rfl:     VICTOZA injection, , Disp: , Rfl:     zolpidem (AMBIEN) 10 mg tablet, Take by mouth daily at bedtime, Disp: , Rfl:         Jen Villa MD

## 2020-02-24 ENCOUNTER — TELEPHONE (OUTPATIENT)
Dept: UROLOGY | Facility: AMBULATORY SURGERY CENTER | Age: 75
End: 2020-02-24

## 2020-02-24 ENCOUNTER — APPOINTMENT (EMERGENCY)
Dept: CT IMAGING | Facility: HOSPITAL | Age: 75
End: 2020-02-24
Payer: MEDICARE

## 2020-02-24 ENCOUNTER — HOSPITAL ENCOUNTER (EMERGENCY)
Facility: HOSPITAL | Age: 75
Discharge: HOME | End: 2020-02-24
Attending: EMERGENCY MEDICINE
Payer: MEDICARE

## 2020-02-24 VITALS
OXYGEN SATURATION: 94 % | TEMPERATURE: 97.5 F | HEART RATE: 54 BPM | WEIGHT: 217.37 LBS | DIASTOLIC BLOOD PRESSURE: 76 MMHG | SYSTOLIC BLOOD PRESSURE: 150 MMHG | BODY MASS INDEX: 32.1 KG/M2 | RESPIRATION RATE: 18 BRPM

## 2020-02-24 DIAGNOSIS — R35.0 URINARY FREQUENCY: ICD-10-CM

## 2020-02-24 DIAGNOSIS — R59.1 LYMPHADENOPATHY: Primary | ICD-10-CM

## 2020-02-24 LAB
ANION GAP SERPL CALCULATED.3IONS-SCNC: 11 MMOL/L (ref 4–13)
APTT PPP: 28 SECONDS (ref 23–37)
BASOPHILS # BLD AUTO: 0.06 THOUSANDS/ΜL (ref 0–0.1)
BASOPHILS NFR BLD AUTO: 1 % (ref 0–1)
BILIRUB UR QL STRIP: NEGATIVE
BUN SERPL-MCNC: 14 MG/DL (ref 5–25)
CALCIUM SERPL-MCNC: 9.5 MG/DL (ref 8.3–10.1)
CHLORIDE SERPL-SCNC: 98 MMOL/L (ref 100–108)
CLARITY UR: CLEAR
CO2 SERPL-SCNC: 29 MMOL/L (ref 21–32)
COLOR UR: YELLOW
COLOR, POC: YELLOW
CREAT SERPL-MCNC: 0.9 MG/DL (ref 0.6–1.3)
EOSINOPHIL # BLD AUTO: 0.28 THOUSAND/ΜL (ref 0–0.61)
EOSINOPHIL NFR BLD AUTO: 6 % (ref 0–6)
ERYTHROCYTE [DISTWIDTH] IN BLOOD BY AUTOMATED COUNT: 12.4 % (ref 11.6–15.1)
GFR SERPL CREATININE-BSD FRML MDRD: 83 ML/MIN/1.73SQ M
GLUCOSE SERPL-MCNC: 160 MG/DL (ref 65–140)
GLUCOSE UR STRIP-MCNC: NEGATIVE MG/DL
HCT VFR BLD AUTO: 43.4 % (ref 36.5–49.3)
HGB BLD-MCNC: 14 G/DL (ref 12–17)
HGB UR QL STRIP.AUTO: NEGATIVE
IMM GRANULOCYTES # BLD AUTO: 0.02 THOUSAND/UL (ref 0–0.2)
IMM GRANULOCYTES NFR BLD AUTO: 0 % (ref 0–2)
INR PPP: 0.91 (ref 0.84–1.19)
KETONES UR STRIP-MCNC: ABNORMAL MG/DL
LEUKOCYTE ESTERASE UR QL STRIP: NEGATIVE
LYMPHOCYTES # BLD AUTO: 0.66 THOUSANDS/ΜL (ref 0.6–4.47)
LYMPHOCYTES NFR BLD AUTO: 14 % (ref 14–44)
MAGNESIUM SERPL-MCNC: 1.5 MG/DL (ref 1.6–2.6)
MCH RBC QN AUTO: 28.9 PG (ref 26.8–34.3)
MCHC RBC AUTO-ENTMCNC: 32.3 G/DL (ref 31.4–37.4)
MCV RBC AUTO: 90 FL (ref 82–98)
MONOCYTES # BLD AUTO: 0.59 THOUSAND/ΜL (ref 0.17–1.22)
MONOCYTES NFR BLD AUTO: 12 % (ref 4–12)
NEUTROPHILS # BLD AUTO: 3.21 THOUSANDS/ΜL (ref 1.85–7.62)
NEUTS SEG NFR BLD AUTO: 67 % (ref 43–75)
NITRITE UR QL STRIP: NEGATIVE
NRBC BLD AUTO-RTO: 0 /100 WBCS
PH UR STRIP.AUTO: 7 [PH] (ref 4.5–8)
PLATELET # BLD AUTO: 229 THOUSANDS/UL (ref 149–390)
PMV BLD AUTO: 9.9 FL (ref 8.9–12.7)
POTASSIUM SERPL-SCNC: 4.1 MMOL/L (ref 3.5–5.3)
PROT UR STRIP-MCNC: NEGATIVE MG/DL
PROTHROMBIN TIME: 12.3 SECONDS (ref 11.6–14.5)
RBC # BLD AUTO: 4.85 MILLION/UL (ref 3.88–5.62)
SODIUM SERPL-SCNC: 138 MMOL/L (ref 136–145)
SP GR UR STRIP.AUTO: 1.02 (ref 1–1.03)
UROBILINOGEN UR QL STRIP.AUTO: 0.2 E.U./DL
WBC # BLD AUTO: 4.82 THOUSAND/UL (ref 4.31–10.16)

## 2020-02-24 PROCEDURE — 80048 BASIC METABOLIC PNL TOTAL CA: CPT | Performed by: EMERGENCY MEDICINE

## 2020-02-24 PROCEDURE — 87086 URINE CULTURE/COLONY COUNT: CPT | Performed by: EMERGENCY MEDICINE

## 2020-02-24 PROCEDURE — 83735 ASSAY OF MAGNESIUM: CPT | Performed by: EMERGENCY MEDICINE

## 2020-02-24 PROCEDURE — 74177 CT ABD & PELVIS W/CONTRAST: CPT

## 2020-02-24 PROCEDURE — 85610 PROTHROMBIN TIME: CPT | Performed by: EMERGENCY MEDICINE

## 2020-02-24 PROCEDURE — 99284 EMERGENCY DEPT VISIT MOD MDM: CPT

## 2020-02-24 PROCEDURE — 51798 US URINE CAPACITY MEASURE: CPT

## 2020-02-24 PROCEDURE — 81003 URINALYSIS AUTO W/O SCOPE: CPT

## 2020-02-24 PROCEDURE — 36415 COLL VENOUS BLD VENIPUNCTURE: CPT | Performed by: EMERGENCY MEDICINE

## 2020-02-24 PROCEDURE — 85025 COMPLETE CBC W/AUTO DIFF WBC: CPT | Performed by: EMERGENCY MEDICINE

## 2020-02-24 PROCEDURE — 85730 THROMBOPLASTIN TIME PARTIAL: CPT | Performed by: EMERGENCY MEDICINE

## 2020-02-24 PROCEDURE — 99284 EMERGENCY DEPT VISIT MOD MDM: CPT | Performed by: EMERGENCY MEDICINE

## 2020-02-24 RX ORDER — CEPHALEXIN 500 MG/1
500 CAPSULE ORAL 4 TIMES DAILY
Qty: 20 CAPSULE | Refills: 0 | Status: SHIPPED | OUTPATIENT
Start: 2020-02-24 | End: 2020-02-29

## 2020-02-24 RX ORDER — ESCITALOPRAM OXALATE 20 MG/1
20 TABLET ORAL DAILY
COMMUNITY

## 2020-02-24 RX ORDER — ASPIRIN 81 MG/1
81 TABLET ORAL DAILY
COMMUNITY

## 2020-02-24 RX ADMIN — IOHEXOL 100 ML: 350 INJECTION, SOLUTION INTRAVENOUS at 13:19

## 2020-02-24 NOTE — TELEPHONE ENCOUNTER
Patient managed by Esmer Quintana is calling to say he is having bladder pain and back pain  Stated his pain level is 8 and has been taking pain medication with no relief    Please call [phone]

## 2020-02-24 NOTE — TELEPHONE ENCOUNTER
Call placed to patient who states he is having 8/10 bladder pain and is taking Vicodin and not receiving any relief from taking this pain medication  Advised patient he should proceed to the nearest ER for further evaluation and care  Patient is agreeable and will proceed to the ER

## 2020-02-24 NOTE — DISCHARGE INSTRUCTIONS
The radiologist is recommending a repeat CT scan in 3-6 months that can be ordered by your primary care doctor or your oncologist

## 2020-02-24 NOTE — ED PROVIDER NOTES
History  Chief Complaint   Patient presents with    Urinary Frequency     pt with hx of prostate cancer and now having urinary frequency  denies blood in urine or fevers  states urine appears darker than normal         History provided by:  Patient   used: No    Medical Problem - Major   Location:  Complaining of urinary frequency  No dysuria  No symptoms of urinary retention  No flank pain  Has history of prostate cancer has issues with his hips as well  No hematuria  No fever  No nausea or vomiting  He is waking up twice in the middle the night to urinate  Severity:  Moderate  Onset quality:  Sudden  Duration: Several days  Timing:  Intermittent  Progression:  Unchanged  Chronicity:  New  Relieved by:  Nothing  Worsened by:  Nothing  Ineffective treatments:  None tried  Associated symptoms: abdominal pain    Associated symptoms: no chest pain, no cough, no diarrhea, no fever, no nausea, no shortness of breath and no vomiting    Associated symptoms comment:  Suprapubic discomfort      Prior to Admission Medications   Prescriptions Last Dose Informant Patient Reported? Taking?    B-D ULTRAFINE III SHORT PEN 31G X 8 MM MISC   Yes No   Fexofenadine HCl (MUCINEX ALLERGY PO)  Self Yes No   Sig: Take 1-2 tablets by mouth as needed     Hydrocodone-Acetaminophen (VICODIN PO)  Self Yes Yes   Sig: Take by mouth daily   LORazepam (ATIVAN) 0 5 mg tablet  Self Yes No   Sig: Take 0 5 mg by mouth every 8 (eight) hours as needed for anxiety   Liraglutide (VICTOZA SC)  Self Yes No   Sig: Inject under the skin daily   Multiple Vitamins-Minerals (MULTIVITAMIN ADULT PO)  Self Yes No   Sig: Take by mouth   Omeprazole 20 MG TBEC  Self Yes No   Sig: daily as needed     VICTOZA injection   Yes Yes   allopurinol (ZYLOPRIM) 100 mg tablet   No Yes   Sig: Take 1 tablet (100 mg total) by mouth daily   aspirin (ECOTRIN LOW STRENGTH) 81 mg EC tablet   Yes Yes   Sig: Take 81 mg by mouth daily   azelastine (ASTELIN) 0 1 % nasal spray  Self Yes Yes   Si sprays into each nostril 3 (three) times a day Use in each nostril as directed     baclofen 10 mg tablet   Yes No   Sig: baclofen 10 mg tablet   clonazePAM (KlonoPIN) 0 5 mg tablet  Self Yes Yes   Sig: Take 0 5 mg by mouth 4 (four) times a day    diclofenac sodium (VOLTAREN) 1 %  Self Yes No   Sig: Apply 2mg 2 times every day   escitalopram (LEXAPRO) 20 mg tablet   Yes Yes   Sig: Take 20 mg by mouth daily   flecainide (TAMBOCOR) 100 mg tablet   Yes Yes   Sig: Every 12 hours   glimepiride (AMARYL) 2 mg tablet  Self Yes Yes   Sig: Take 4 mg by mouth every morning before breakfast    hydrocortisone 2 5 % cream   Yes No   ketoconazole (NIZORAL) 2 % cream   Yes No   metFORMIN (GLUCOPHAGE) 1000 MG tablet   Yes Yes   Si,000 mg 2 (two) times a day with meals    metFORMIN (GLUCOPHAGE) 500 mg tablet  Self Yes No   Sig: Take 500 mg by mouth 2 (two) times a day with meals    montelukast (SINGULAIR) 10 mg tablet  Self Yes No   Sig: Daily   mupirocin (BACTROBAN) 2 % ointment   Yes Yes   Sig: Apply topically 3 (three) times a day   simvastatin (ZOCOR) 20 mg tablet   Yes Yes   Si mg daily at bedtime    solifenacin (VESICARE) 10 MG tablet   No Yes   Sig: Take 1 tablet (10 mg total) by mouth daily at bedtime   traMADol (ULTRAM) 50 mg tablet   Yes Yes   Si mg 2 (two) times a day    zolpidem (AMBIEN) 10 mg tablet  Self Yes Yes   Sig: Take by mouth daily at bedtime      Facility-Administered Medications: None       Past Medical History:   Diagnosis Date    Arthritis     Chronic bladder pain 2017    Chronic pain     Diabetes mellitus [facility] [facility]     Elevated PSA     Frequency of urination     Hematuria, gross 06/15/2017    Hypercholesterolemia     Hyperlipidemia     Kidney stones 2016    Lymphoma [facility] [address] [facility]     [facility] 2017    Pain in hip     Skin cancer     Sleep apnea        Past Surgical History:   Procedure Laterality Date  CYSTOSCOPY  06/09/2017    IN CYSTOURETHROSCOPY,URETER CATHETER Bilateral 8/29/2018    Procedure: CYSTOSCOPY,  b/l retrogrades pyelogram;  Surgeon: Elsa Esparza MD;  Location: [facility];  Service: Urology    PROSTATE BIOPSY  04/06/2017    SINUS SURGERY  2010    SKIN CANCER EXCISION         Family History   Problem Relation Age of Onset    Heart disease Mother      I have reviewed and agree with the history as documented  E-Cigarette/Vaping     E-Cigarette/Vaping Substances     Social History     Tobacco Use    Smoking status: Never Smoker    Smokeless tobacco: Never Used   Substance Use Topics    Alcohol use: No    Drug use: No       Review of Systems   Constitutional: Negative for appetite change and fever  Respiratory: Negative for cough and shortness of breath  Cardiovascular: Negative for chest pain  Gastrointestinal: Positive for abdominal pain  Negative for constipation, diarrhea, nausea and vomiting  Genitourinary: Positive for frequency and urgency  Negative for decreased urine volume, difficulty urinating, dysuria, flank pain and hematuria  All other systems reviewed and are negative  Physical Exam  Physical Exam   Constitutional: He appears well-developed and well-nourished  He is cooperative  Non-toxic appearance  He does not have a sickly appearance  He does not appear ill  No distress  HENT:   Head: Normocephalic and atraumatic  Right Ear: Hearing normal  No drainage or swelling  Left Ear: Hearing normal  No drainage or swelling  Mouth/Throat: Mucous membranes are normal    Eyes: Conjunctivae and lids are normal  Right eye exhibits no discharge  Left eye exhibits no discharge  Neck: Trachea normal and normal range of motion  No JVD present  Cardiovascular: Normal rate, regular rhythm, normal heart sounds, intact distal pulses and normal pulses  Exam reveals no gallop and no friction rub  No murmur heard    Pulmonary/Chest: Effort normal and breath sounds normal  No stridor  No respiratory distress  He has no wheezes  He has no rales  He exhibits no tenderness  Abdominal: Soft  Normal appearance  There is tenderness in the suprapubic area  There is no rebound, no guarding and no CVA tenderness  Musculoskeletal: Normal range of motion  He exhibits no edema, tenderness or deformity  Lymphadenopathy:     He has no cervical adenopathy  Neurological: He is alert  He has normal strength  No cranial nerve deficit or sensory deficit  He exhibits normal muscle tone  GCS eye subscore is 4  GCS verbal subscore is 5  GCS motor subscore is 6  Skin: Skin is warm, dry and intact  No rash noted  He is not diaphoretic  No pallor  Psychiatric: He has a normal mood and affect  His speech is normal  Cognition and memory are normal    Nursing note and vitals reviewed        Vital Signs  ED Triage Vitals   Temperature Pulse Respirations Blood Pressure SpO2   02/24/20 1056 02/24/20 1056 02/24/20 1056 02/24/20 1056 02/24/20 1056   97 5 °F (36 4 °C) 76 16 121/73 95 %      Temp Source Heart Rate Source Patient Position - Orthostatic VS BP Location FiO2 (%)   02/24/20 1056 02/24/20 1239 02/24/20 1239 02/24/20 1239 --   Temporal Monitor Lying Left arm       Pain Score       02/24/20 1056       6           Vitals:    02/24/20 1056 02/24/20 1239 02/24/20 1350 02/24/20 1450   BP: 121/73 131/72 150/74 150/76   Pulse: 76 65 60 (!) 54   Patient Position - Orthostatic VS:  Lying Sitting Sitting         Visual Acuity      ED Medications  Medications   iohexol (OMNIPAQUE) 350 MG/ML injection (SINGLE-DOSE) 100 mL (100 mL Intravenous Given 2/24/20 1319)       Diagnostic Studies  Results Reviewed     Procedure Component Value Units Date/Time    Urine culture [[id number]] Collected:  02/24/20 1333    Lab Status:  Final result Specimen:  Urine, Clean Catch Updated:  02/25/20 1140     Urine Culture [id number] cfu/ml     Protime-INR [[id number]]  (Normal) Collected:  02/24/20 1145    Lab Status:  Final result Specimen:  Blood from Arm, Right Updated:  02/24/20 1204     Protime 12 3 seconds      INR 0 91    APTT [[id number]]  (Normal) Collected:  02/24/20 1145    Lab Status:  Final result Specimen:  Blood from Arm, Right Updated:  02/24/20 1204     PTT 28 seconds     POCT urinalysis dipstick [[id number]]  (Normal) Resulted:  02/24/20 1204    Lab Status:  Final result Specimen:  Urine Updated:  02/24/20 1204     Color, UA yellow    Urine Macroscopic, POC [[id number]]  (Abnormal) Collected:  02/24/20 1202    Lab Status:  Final result Specimen:  Urine Updated:  02/24/20 1203     Color, UA Yellow     Clarity, UA Clear     pH, UA 7 0     Leukocytes, UA Negative     Nitrite, UA Negative     Protein, UA Negative mg/dl      Glucose, UA Negative mg/dl      Ketones, UA Trace mg/dl      Urobilinogen, UA 0 2 E U /dl      Bilirubin, UA Negative     Blood, UA Negative     Specific Gravity, UA 1 020    Narrative:       CLINITEK RESULT    Basic metabolic panel [[id number]]  (Abnormal) Collected:  02/24/20 1145    Lab Status:  Final result Specimen:  Blood from Arm, Right Updated:  02/24/20 1201     Sodium 138 mmol/L      Potassium 4 1 mmol/L      Chloride 98 mmol/L      CO2 29 mmol/L      ANION GAP 11 mmol/L      BUN 14 mg/dL      Creatinine 0 90 mg/dL      Glucose 160 mg/dL      Calcium 9 5 mg/dL      eGFR 83 ml/min/1 73sq m     Narrative:       [place] guidelines for Chronic Kidney Disease (CKD):     Stage 1 with normal or high GFR (GFR > 90 mL/min/1 73 square meters)    Stage 2 Mild CKD (GFR = 60-89 mL/min/1 73 square meters)    Stage 3A Moderate CKD (GFR = 45-59 mL/min/1 73 square meters)    Stage 3B Moderate CKD (GFR = 30-44 mL/min/1 73 square meters)    Stage 4 Severe CKD (GFR = 15-29 mL/min/1 73 square meters)    Stage 5 End Stage CKD (GFR <15 mL/min/1 73 square meters)  Note: GFR calculation is accurate only with a steady state creatinine    Magnesium [[id number]]  (Abnormal) Collected: 02/24/20 1145    Lab Status:  Final result Specimen:  Blood from Arm, Right Updated:  02/24/20 1201     Magnesium 1 5 mg/dL     CBC and differential [[id number]] Collected:  02/24/20 1145    Lab Status:  Final result Specimen:  Blood from Arm, Right Updated:  02/24/20 1150     WBC 4 82 Thousand/uL      RBC 4 85 Million/uL      Hemoglobin 14 0 g/dL      Hematocrit 43 4 %      MCV 90 fL      MCH 28 9 pg      MCHC 32 3 g/dL      RDW 12 4 %      MPV 9 9 fL      Platelets 853 Thousands/uL      nRBC 0 /100 WBCs      Neutrophils Relative 67 %      Immat GRANS % 0 %      Lymphocytes Relative 14 %      Monocytes Relative 12 %      Eosinophils Relative 6 %      Basophils Relative 1 %      Neutrophils Absolute 3 21 Thousands/µL      Immature Grans Absolute 0 02 Thousand/uL      Lymphocytes Absolute 0 66 Thousands/µL      Monocytes Absolute 0 59 Thousand/µL      Eosinophils Absolute 0 28 Thousand/µL      Basophils Absolute 0 06 Thousands/µL                  CT abdomen pelvis with contrast   Final Result by Chas Dunbar MD (02/24 3174)      1   3 mm nonobstructing stone in the right kidney   2  Nonenlarged lymph nodes with inflammatory stranding noted within the posterior mesentery/retroperitoneum, uncertain etiology  Recommend follow-up CT in 3-6 months   3  Cholelithiasis without cholecystitis         The study was marked in EPIC for significant and follow-up notifications  Workstation performed: [id number]                    Procedures  Procedures         ED Course                               MDM  Number of Diagnoses or Management Options  Lymphadenopathy:   Urinary frequency:   Diagnosis management comments: Patient's prevoid bladder scan was only 16 cc and postvoid was 0  He did get up multiple times to go to the bathroom    He had lymphadenopathy on his scan which is rather nonspecific but radiology recommends a repeat CT scan in the patient was told about this the can set this up through his primary care doctor and oncologist   He does have a urologist and will need urology follow-up this  I am going to place him on an antibiotic just in case this turns out to be a urinary tract infection  Culture of the urine is pending and that was ordered separately  He did have some ketones in his urine and I told him that there may be an element of dehydration that he should drink more but maybe avoid this before bedtime  He could have some prostate issues  Urology will need to work this up further as an outpatient  His renal function is normal   He is not retaining urine  There is no other acute findings on the CT scan  Amount and/or Complexity of Data Reviewed  Clinical lab tests: ordered and reviewed  Tests in the radiology section of CPT®: ordered and reviewed    Patient Progress  Patient progress: stable        Disposition  Final diagnoses:   Lymphadenopathy   Urinary frequency     Time reflects when diagnosis was documented in both MDM as applicable and the Disposition within this note     Time User Action Codes Description Comment    2/24/2020  3:29 PM Edilia Bardalescortneyer [R59 1] Lymphadenopathy     2/24/2020  3:29 PM Tony Marinelli [R35 0] Urinary frequency       ED Disposition     ED Disposition Condition Date/Time Comment    Discharge Stable Mon Feb 24, 2020  3:29 PM Melissa Thompson discharge to home/self care              Follow-up Information     Follow up With Specialties Details Why Contact Info Additional Information    Ericka Hernández MD Internal Medicine Schedule an appointment as soon as possible for a visit in 1 week  [id number] [facility]  [address] [id number] [phone]       [facility] Urology Schedule an appointment as soon as possible for a visit in 1 week  [address] [facility]  [phone]  7  [facility], [address], [place], [place], [address]   [phone]          Discharge Medication List as of 2/24/2020  3:31 PM      CONTINUE these medications which have NOT CHANGED    Details   allopurinol (ZYLOPRIM) 100 mg tablet Take 1 tablet (100 mg total) by mouth daily, Starting Tue 1/7/2020, Normal      aspirin (ECOTRIN LOW STRENGTH) 81 mg EC tablet Take 81 mg by mouth daily, Historical Med      azelastine (ASTELIN) 0 1 % nasal spray 2 sprays into each nostril 3 (three) times a day Use in each nostril as directed  , Historical Med      clonazePAM (KlonoPIN) 0 5 mg tablet Take 0 5 mg by mouth 4 (four) times a day , Historical Med      escitalopram (LEXAPRO) 20 mg tablet Take 20 mg by mouth daily, Historical Med      flecainide (TAMBOCOR) 100 mg tablet Every 12 hours, Historical Med      glimepiride (AMARYL) 2 mg tablet Take 4 mg by mouth every morning before breakfast , Historical Med      Hydrocodone-Acetaminophen (VICODIN PO) Take by mouth daily, Historical Med      !! metFORMIN (GLUCOPHAGE) 1000 MG tablet 1,000 mg 2 (two) times a day with meals , Starting Fri 4/26/2019, Historical Med      mupirocin (BACTROBAN) 2 % ointment Apply topically 3 (three) times a day, Historical Med      simvastatin (ZOCOR) 20 mg tablet 20 mg daily at bedtime , Starting Mon 11/5/2018, Historical Med      solifenacin (VESICARE) 10 MG tablet Take 1 tablet (10 mg total) by mouth daily at bedtime, Starting Tue 1/7/2020, Normal      traMADol (ULTRAM) 50 mg tablet 50 mg 2 (two) times a day , Starting Tue 11/20/2018, Historical Med      !!  VICTOZA injection Starting Sat 9/22/2018, Historical Med      zolpidem (AMBIEN) 10 mg tablet Take by mouth daily at bedtime, Historical Med      B-D ULTRAFINE III SHORT PEN 31G X 8 MM MISC Starting Sat 9/22/2018, Historical Med      baclofen 10 mg tablet baclofen 10 mg tablet, Historical Med      diclofenac sodium (VOLTAREN) 1 % Apply 2mg 2 times every day, Historical Med      Fexofenadine HCl (MUCINEX ALLERGY PO) Take 1-2 tablets by mouth as needed  , Historical Med      hydrocortisone 2 5 % cream Starting Sat 7/13/2019, Historical Med      ketoconazole (NIZORAL) 2 % cream Starting Sat 7/13/2019, Historical Med      !! Liraglutide (VICTOZA SC) Inject under the skin daily, Historical Med      LORazepam (ATIVAN) 0 5 mg tablet Take 0 5 mg by mouth every 8 (eight) hours as needed for anxiety, Historical Med      !! metFORMIN (GLUCOPHAGE) 500 mg tablet Take 500 mg by mouth 2 (two) times a day with meals , Historical Med      montelukast (SINGULAIR) 10 mg tablet Daily, Historical Med      Multiple Vitamins-Minerals (MULTIVITAMIN ADULT PO) Take by mouth, Historical Med      Omeprazole 20 MG TBEC daily as needed  , Historical Med       !! - Potential duplicate medications found  Please discuss with provider  No discharge procedures on file      PDMP Review     None          ED Provider  Electronically Signed by           Misbah Benson MD  02/26/20 6054

## 2020-02-25 LAB — BACTERIA UR CULT: NORMAL

## 2020-04-02 ENCOUNTER — TELEPHONE (OUTPATIENT)
Dept: UROLOGY | Facility: MEDICAL CENTER | Age: 75
End: 2020-04-02

## 2020-04-02 ENCOUNTER — TELEMEDICINE (OUTPATIENT)
Dept: UROLOGY | Facility: MEDICAL CENTER | Age: 75
End: 2020-04-02
Payer: MEDICARE

## 2020-04-02 DIAGNOSIS — R31.0 GROSS HEMATURIA: Primary | ICD-10-CM

## 2020-04-02 PROCEDURE — 99442 PR PHYS/QHP TELEPHONE EVALUATION 11-20 MIN: CPT | Performed by: UROLOGY

## 2020-04-03 ENCOUNTER — TELEPHONE (OUTPATIENT)
Dept: UROLOGY | Facility: MEDICAL CENTER | Age: 75
End: 2020-04-03

## 2020-04-13 ENCOUNTER — TELEMEDICINE (OUTPATIENT)
Dept: UROLOGY | Facility: MEDICAL CENTER | Age: 75
End: 2020-04-13
Payer: MEDICARE

## 2020-04-13 VITALS — WEIGHT: 210 LBS | BODY MASS INDEX: 31.1 KG/M2 | HEIGHT: 69 IN

## 2020-04-13 DIAGNOSIS — R35.1 NOCTURIA: Primary | ICD-10-CM

## 2020-04-13 DIAGNOSIS — C61 PROSTATE CANCER (HCC): ICD-10-CM

## 2020-04-13 PROCEDURE — 99442 PR PHYS/QHP TELEPHONE EVALUATION 11-20 MIN: CPT | Performed by: UROLOGY

## 2020-04-13 RX ORDER — SOLIFENACIN SUCCINATE 5 MG/1
5 TABLET, FILM COATED ORAL DAILY
Qty: 90 TABLET | Refills: 3 | Status: SHIPPED | OUTPATIENT
Start: 2020-04-13 | End: 2021-03-03 | Stop reason: SDUPTHER

## 2020-04-13 RX ORDER — DILTIAZEM HYDROCHLORIDE EXTENDED-RELEASE TABLETS 240 MG/1
240 TABLET, EXTENDED RELEASE ORAL DAILY
COMMUNITY
Start: 2020-01-23 | End: 2021-01-22

## 2020-04-15 ENCOUNTER — TELEPHONE (OUTPATIENT)
Dept: UROLOGY | Facility: MEDICAL CENTER | Age: 75
End: 2020-04-15

## 2020-04-15 ENCOUNTER — TELEPHONE (OUTPATIENT)
Dept: UROLOGY | Facility: CLINIC | Age: 75
End: 2020-04-15

## 2020-05-12 ENCOUNTER — TELEPHONE (OUTPATIENT)
Dept: UROLOGY | Facility: MEDICAL CENTER | Age: 75
End: 2020-05-12

## 2020-06-10 ENCOUNTER — TELEPHONE (OUTPATIENT)
Dept: UROLOGY | Facility: MEDICAL CENTER | Age: 75
End: 2020-06-10

## 2020-06-10 DIAGNOSIS — N20.0 NEPHROLITHIASIS: Primary | ICD-10-CM

## 2020-06-10 DIAGNOSIS — R30.0 DYSURIA: ICD-10-CM

## 2020-06-12 ENCOUNTER — HOSPITAL ENCOUNTER (EMERGENCY)
Facility: HOSPITAL | Age: 75
Discharge: HOME | End: 2020-06-12
Attending: EMERGENCY MEDICINE | Admitting: EMERGENCY MEDICINE
Payer: MEDICARE

## 2020-06-12 ENCOUNTER — HOSPITAL ENCOUNTER (OUTPATIENT)
Dept: ULTRASOUND IMAGING | Facility: HOSPITAL | Age: 75
Discharge: HOME | End: 2020-06-12
Payer: MEDICARE

## 2020-06-12 VITALS
TEMPERATURE: 97.5 F | RESPIRATION RATE: 16 BRPM | SYSTOLIC BLOOD PRESSURE: 154 MMHG | DIASTOLIC BLOOD PRESSURE: 90 MMHG | BODY MASS INDEX: 29.98 KG/M2 | HEART RATE: 74 BPM | OXYGEN SATURATION: 97 % | WEIGHT: 203 LBS

## 2020-06-12 DIAGNOSIS — M70.21 OLECRANON BURSITIS OF RIGHT ELBOW: Primary | ICD-10-CM

## 2020-06-12 DIAGNOSIS — N20.0 NEPHROLITHIASIS: ICD-10-CM

## 2020-06-12 PROCEDURE — 99283 EMERGENCY DEPT VISIT LOW MDM: CPT

## 2020-06-12 PROCEDURE — 76770 US EXAM ABDO BACK WALL COMP: CPT

## 2020-06-12 PROCEDURE — 99284 EMERGENCY DEPT VISIT MOD MDM: CPT | Performed by: PHYSICIAN ASSISTANT

## 2020-06-12 RX ORDER — NAPROXEN 375 MG/1
375 TABLET ORAL 2 TIMES DAILY WITH MEALS
Qty: 10 TABLET | Refills: 0 | Status: SHIPPED | OUTPATIENT
Start: 2020-06-12 | End: 2020-06-17

## 2020-06-15 ENCOUNTER — TELEPHONE (OUTPATIENT)
Dept: UROLOGY | Facility: MEDICAL CENTER | Age: 75
End: 2020-06-15

## 2020-06-26 RX ORDER — VALACYCLOVIR HYDROCHLORIDE 1 G/1
1000 TABLET, FILM COATED ORAL 3 TIMES DAILY
COMMUNITY
Start: 2020-06-20 | End: 2020-06-30

## 2020-06-29 ENCOUNTER — OFFICE VISIT (OUTPATIENT)
Dept: UROLOGY | Facility: CLINIC | Age: 75
End: 2020-06-29
Payer: MEDICARE

## 2020-06-29 VITALS
HEART RATE: 98 BPM | WEIGHT: 203 LBS | DIASTOLIC BLOOD PRESSURE: 72 MMHG | SYSTOLIC BLOOD PRESSURE: 130 MMHG | BODY MASS INDEX: 29.98 KG/M2

## 2020-06-29 DIAGNOSIS — C61 MALIGNANT NEOPLASM OF PROSTATE (HCC): ICD-10-CM

## 2020-06-29 DIAGNOSIS — C61 PROSTATE CANCER (HCC): ICD-10-CM

## 2020-06-29 DIAGNOSIS — R30.0 DYSURIA: ICD-10-CM

## 2020-06-29 DIAGNOSIS — R35.0 URINARY FREQUENCY: Primary | ICD-10-CM

## 2020-06-29 LAB
SL AMB  POCT GLUCOSE, UA: NORMAL
SL AMB LEUKOCYTE ESTERASE,UA: NORMAL
SL AMB POCT BILIRUBIN,UA: NORMAL
SL AMB POCT BLOOD,UA: NORMAL
SL AMB POCT CLARITY,UA: CLEAR
SL AMB POCT COLOR,UA: YELLOW
SL AMB POCT KETONES,UA: NORMAL
SL AMB POCT NITRITE,UA: NORMAL
SL AMB POCT PH,UA: 5.5
SL AMB POCT SPECIFIC GRAVITY,UA: 1.02
SL AMB POCT URINE PROTEIN: NORMAL
SL AMB POCT UROBILINOGEN: 0.2

## 2020-06-29 PROCEDURE — 99213 OFFICE O/P EST LOW 20 MIN: CPT | Performed by: PHYSICIAN ASSISTANT

## 2020-06-29 PROCEDURE — 81002 URINALYSIS NONAUTO W/O SCOPE: CPT | Performed by: PHYSICIAN ASSISTANT

## 2020-07-27 ENCOUNTER — TELEPHONE (OUTPATIENT)
Dept: UROLOGY | Facility: MEDICAL CENTER | Age: 75
End: 2020-07-27

## 2020-07-27 DIAGNOSIS — R35.0 URINARY FREQUENCY: Primary | ICD-10-CM

## 2020-07-27 NOTE — TELEPHONE ENCOUNTER
Spoke with pt, he is c/o frequency q15m during the day, and about q1h during the night  Denies burning or pain, but states he does have pressure, feels he is voiding with a full stream but he is not emptying  Pt agrees to have urine testing done tomorrow    Orders faxed to [address]   ER precautions given

## 2020-07-27 NOTE — TELEPHONE ENCOUNTER
Patient managed by Dr Festus Thacker patient stated he has a history of prostate cancer  Patient stated he has been having frequent urination especially at night every hour, it started about early last week, increase each night  Patient would like to know if he needs to be seen or can a medication can be ordered    Patient can be reached at [phone]

## 2020-07-29 ENCOUNTER — TELEPHONE (OUTPATIENT)
Dept: UROLOGY | Facility: MEDICAL CENTER | Age: 75
End: 2020-07-29

## 2020-07-29 NOTE — TELEPHONE ENCOUNTER
Spoke to pt re urinary frequency (every half hour during the day and every hour at night) and burning with back and hip pain  Pt has history of kidney stones but he says this does not feel like that is the issue  Urinalysis with microscopic was done yesterday  Last visit was 6/29/20 with Nunu Bonilla for evaluation of  dysuria  Advised pt to drink at least 60 mls water and take AZO to relieve urinary symptoms if needed  Pt stated he takes vicodin at night  Advised pt urinalysis would be reviewed and we would call him tomorrow

## 2020-07-29 NOTE — TELEPHONE ENCOUNTER
Patient called and advised that he started with burning and is having back and hip pain  Patient would like to know what to do  Please advise

## 2020-07-30 ENCOUNTER — TELEPHONE (OUTPATIENT)
Dept: UROLOGY | Facility: MEDICAL CENTER | Age: 75
End: 2020-07-30

## 2020-07-30 NOTE — TELEPHONE ENCOUNTER
Called to advise pt to have urine culture done  order on file and to explain DIEGO looked at his urinalysis with microscopic from 7/28  She saw no evidence of infection but the elevated glucose level could could be causing urinary frequency and he should contact his PCP with regard to his diabetes

## 2020-07-30 NOTE — TELEPHONE ENCOUNTER
Call placed to patient and spoke with him  Informed him of the recommendations of CRNP at this time  He is aware and will plan to call his PCP to inform them of the symptoms he has been experiencing

## 2020-07-30 NOTE — TELEPHONE ENCOUNTER
Patient returned call  Patient advised that he is still having pain in his hips and back he is not sure if he has a kidney stone  Please advise

## 2020-07-30 NOTE — TELEPHONE ENCOUNTER
Recent UA from 7/28 does not appear positive for infection  He was noted to have elevated glucose in urine  Underlying diabetes can cause increased urinary frequency  Would recommend patient follow up with PCP  Urine culture was not completed, please call lab to process  Agree with nurses's recommendations to increase water intake and avoid bladder irritants

## 2020-08-24 ENCOUNTER — TELEPHONE (OUTPATIENT)
Dept: UROLOGY | Facility: MEDICAL CENTER | Age: 75
End: 2020-08-24

## 2020-08-24 DIAGNOSIS — R30.0 BURNING WITH URINATION: ICD-10-CM

## 2020-08-24 DIAGNOSIS — R35.0 URINARY FREQUENCY: Primary | ICD-10-CM

## 2020-08-24 NOTE — TELEPHONE ENCOUNTER
Called pt, he will go for urine sample today at [facility] on [address] [facility]  Orders Faxed to [facility]

## 2020-08-24 NOTE — TELEPHONE ENCOUNTER
Possible UTI  Dr Vero Rae    Patient believes they may have a possible UTI  Currently experiencing the following symptoms: urgency, frequency, and burning      Patient can be reached at: [phone]

## 2020-08-25 NOTE — TELEPHONE ENCOUNTER
Patient calling for results of UA  Advised UC will take 3 days       UA in care everywhere - [facility]

## 2020-08-26 NOTE — TELEPHONE ENCOUNTER
Called pt, his urine culture is No Growth  He continues with urinary frequency, burning, and feeling of incomplete emptying  He is requesting to be seen  Appt given with Dr Toni Elena tomorrow

## 2020-08-27 ENCOUNTER — OFFICE VISIT (OUTPATIENT)
Dept: UROLOGY | Facility: MEDICAL CENTER | Age: 75
End: 2020-08-27
Payer: MEDICARE

## 2020-08-27 VITALS
WEIGHT: 203 LBS | TEMPERATURE: 97.1 F | BODY MASS INDEX: 30.07 KG/M2 | SYSTOLIC BLOOD PRESSURE: 134 MMHG | DIASTOLIC BLOOD PRESSURE: 82 MMHG | HEIGHT: 69 IN

## 2020-08-27 DIAGNOSIS — C61 MALIGNANT NEOPLASM OF PROSTATE (HCC): Primary | ICD-10-CM

## 2020-08-27 DIAGNOSIS — N20.0 CALCULUS OF KIDNEY: ICD-10-CM

## 2020-08-27 DIAGNOSIS — R35.1 NOCTURIA: ICD-10-CM

## 2020-08-27 DIAGNOSIS — R35.0 FREQUENCY OF URINATION: ICD-10-CM

## 2020-08-27 PROCEDURE — 99214 OFFICE O/P EST MOD 30 MIN: CPT | Performed by: UROLOGY

## 2020-08-27 PROCEDURE — 81003 URINALYSIS AUTO W/O SCOPE: CPT | Performed by: UROLOGY

## 2020-08-27 RX ORDER — IBUPROFEN 200 MG
TABLET ORAL EVERY 6 HOURS PRN
COMMUNITY

## 2020-08-27 RX ORDER — SOLIFENACIN SUCCINATE 10 MG/1
10 TABLET, FILM COATED ORAL
Qty: 90 TABLET | Refills: 3 | Status: SHIPPED | OUTPATIENT
Start: 2020-08-27 | End: 2021-03-03 | Stop reason: SDUPTHER

## 2020-08-27 NOTE — PROGRESS NOTES
Assessment/Plan:  1  Malignant neoplasm of the prostate -patient has a stable PSA in the 5 2 range  He has been treated with intermittent androgen deprivation therapy and will remain off of ADT for the time being with repeat PSA testosterone level and chemistry profile in 6 months  2  Nocturia  Patient is not sure as to whether he is taking VESIcare, this will be reinstituted and the degree of nocturia will be measured  Currently he has nocturia 4-5 times nightly  3  Renal stones -uric acid -on allopurinol -no intervention, continue allopurinol      No problem-specific Assessment & Plan notes found for this encounter  Diagnoses and all orders for this visit:    Malignant neoplasm of prostate ([facility] [address] )  -     PSA Total, Diagnostic; Future  -     Testosterone; Future  -     Comprehensive metabolic panel; Future    Nocturia  -     POCT urine dip auto non-scope    Calculus of kidney    Frequency of urination  -     solifenacin (VESICARE) 10 MG tablet; Take 1 tablet (10 mg total) by mouth daily at bedtime    Other orders  -     ibuprofen (Advil) 200 mg tablet; Take by mouth every 6 (six) hours as needed for mild pain          Subjective:      Patient ID: Bobby Williamson is a 67 y o  male  HPI   68-year-old male followed by Dr Lisa Foley for adenocarcinoma of the prostate treated with intermittent androgen deprivation therapy presents with a PSA of 5 2 down from 5 4  He is voiding adequately with a good stream does note nocturia noting that it was down to twice nightly but he stopped taking VESIcare and now he has about 4-5 times per night  He denies dysuria gross hematuria  He denies any significant abdominal or flank pain although he does have uric acid nephrolithiasis on allopurinol  The patient did undergo imaging earlier this summer including CT stone study as well as renal ultrasound  He has no evidence of upper tract obstruction    The following portions of the patient's history were reviewed and updated as appropriate: allergies, current medications, past family history, past medical history, past social history, past surgical history and problem list     Review of Systems   Musculoskeletal: Positive for arthralgias, back pain and myalgias  All other systems reviewed and are negative  Objective:      /82 (BP Location: Left arm, Patient Position: Sitting, Cuff Size: Adult)   Temp (!) 97 1 °F (36 2 °C)   Ht 5' 9" (1 753 m)   Wt 92 1 kg (203 lb)   BMI 29 98 kg/m²          Physical Exam  Vitals signs reviewed  Constitutional:       Appearance: Normal appearance  HENT:      Head: Normocephalic and atraumatic  Nose: Nose normal    Eyes:      Extraocular Movements: Extraocular movements intact  Pulmonary:      Effort: Pulmonary effort is normal       Breath sounds: Normal breath sounds  Abdominal:      General: There is no distension  Palpations: Abdomen is soft  Neurological:      General: No focal deficit present  Mental Status: He is alert and oriented to person, place, and time  Psychiatric:         Mood and Affect: Mood normal          Behavior: Behavior normal          Thought Content:  Thought content normal          Judgment: Judgment normal

## 2020-08-27 NOTE — LETTER
August 27, 2020     [place], [address] [address] [facility]    Patient: Steve Mccormack   YOB: 1945   Date of Visit: 8/27/2020       Dear Dr Leigh Cardenas:    Thank you for referring Billy Coello to me for evaluation  Below are my notes for this consultation  If you have questions, please do not hesitate to call me  I look forward to following your patient along with you  Sincerely,        Mary Ellen Mathews MD        CC: No Recipients  Mary Ellen Mathews MD  8/27/2020  4:01 PM  Sign when Signing Visit  Assessment/Plan:  1  Malignant neoplasm of the prostate -patient has a stable PSA in the 5 2 range  He has been treated with intermittent androgen deprivation therapy and will remain off of ADT for the time being with repeat PSA testosterone level and chemistry profile in 6 months  2  Nocturia  Patient is not sure as to whether he is taking VESIcare, this will be reinstituted and the degree of nocturia will be measured  Currently he has nocturia 4-5 times nightly  3  Renal stones -uric acid -on allopurinol -no intervention, continue allopurinol      No problem-specific Assessment & Plan notes found for this encounter  Diagnoses and all orders for this visit:    Malignant neoplasm of prostate ([address] )  -     PSA Total, Diagnostic; Future  -     Testosterone; Future  -     Comprehensive metabolic panel; Future    Nocturia  -     POCT urine dip auto non-scope    Calculus of kidney    Frequency of urination  -     solifenacin (VESICARE) 10 MG tablet; Take 1 tablet (10 mg total) by mouth daily at bedtime    Other orders  -     ibuprofen (Advil) 200 mg tablet; Take by mouth every 6 (six) hours as needed for mild pain          Subjective:      Patient ID: Steve Mccormack is a 67 y o  male      HPI   23-year-old male followed by Dr Michaelle Shannon for adenocarcinoma of the prostate treated with intermittent androgen deprivation therapy presents with a PSA of 5 2 down from 5 4  He is voiding adequately with a good stream does note nocturia noting that it was down to twice nightly but he stopped taking VESIcare and now he has about 4-5 times per night  He denies dysuria gross hematuria  He denies any significant abdominal or flank pain although he does have uric acid nephrolithiasis on allopurinol  The patient did undergo imaging earlier this summer including CT stone study as well as renal ultrasound  He has no evidence of upper tract obstruction  The following portions of the patient's history were reviewed and updated as appropriate: allergies, current medications, past family history, past medical history, past social history, past surgical history and problem list     Review of Systems   Musculoskeletal: Positive for arthralgias, back pain and myalgias  All other systems reviewed and are negative  Objective:      /82 (BP Location: Left arm, Patient Position: Sitting, Cuff Size: Adult)   Temp (!) 97 1 °F (36 2 °C)   Ht 5' 9" (1 753 m)   Wt 92 1 kg (203 lb)   BMI 29 98 kg/m²          Physical Exam  Vitals signs reviewed  Constitutional:       Appearance: Normal appearance  HENT:      Head: Normocephalic and atraumatic  Nose: Nose normal    Eyes:      Extraocular Movements: Extraocular movements intact  Pulmonary:      Effort: Pulmonary effort is normal       Breath sounds: Normal breath sounds  Abdominal:      General: There is no distension  Palpations: Abdomen is soft  Neurological:      General: No focal deficit present  Mental Status: He is alert and oriented to person, place, and time  Psychiatric:         Mood and Affect: Mood normal          Behavior: Behavior normal          Thought Content:  Thought content normal          Judgment: Judgment normal

## 2021-01-01 ENCOUNTER — TELEPHONE (OUTPATIENT)
Dept: OTHER | Facility: OTHER | Age: 76
End: 2021-01-01

## 2021-01-01 ENCOUNTER — APPOINTMENT (EMERGENCY)
Dept: CT IMAGING | Facility: HOSPITAL | Age: 76
End: 2021-01-01
Payer: MEDICARE

## 2021-01-01 ENCOUNTER — OFFICE VISIT (OUTPATIENT)
Dept: UROLOGY | Facility: MEDICAL CENTER | Age: 76
End: 2021-01-01
Payer: MEDICARE

## 2021-01-01 ENCOUNTER — TELEPHONE (OUTPATIENT)
Dept: UROLOGY | Facility: MEDICAL CENTER | Age: 76
End: 2021-01-01

## 2021-01-01 ENCOUNTER — HOSPITAL ENCOUNTER (EMERGENCY)
Facility: HOSPITAL | Age: 76
Discharge: HOME | End: 2021-09-08
Attending: EMERGENCY MEDICINE
Payer: MEDICARE

## 2021-01-01 VITALS
HEIGHT: 69 IN | WEIGHT: 225 LBS | HEART RATE: 62 BPM | SYSTOLIC BLOOD PRESSURE: 120 MMHG | BODY MASS INDEX: 33.33 KG/M2 | DIASTOLIC BLOOD PRESSURE: 60 MMHG

## 2021-01-01 VITALS
HEART RATE: 60 BPM | BODY MASS INDEX: 32.23 KG/M2 | SYSTOLIC BLOOD PRESSURE: 130 MMHG | TEMPERATURE: 98.2 F | DIASTOLIC BLOOD PRESSURE: 70 MMHG | OXYGEN SATURATION: 94 % | RESPIRATION RATE: 16 BRPM | WEIGHT: 218.26 LBS

## 2021-01-01 DIAGNOSIS — R59.0 MESENTERIC LYMPHADENOPATHY: ICD-10-CM

## 2021-01-01 DIAGNOSIS — E11.9 CONTROLLED TYPE 2 DIABETES MELLITUS WITHOUT COMPLICATION, WITHOUT LONG-TERM CURRENT USE OF INSULIN (HCC): ICD-10-CM

## 2021-01-01 DIAGNOSIS — R35.0 FREQUENCY OF URINATION: Primary | ICD-10-CM

## 2021-01-01 DIAGNOSIS — C61 PROSTATE CANCER (HCC): ICD-10-CM

## 2021-01-01 DIAGNOSIS — R30.0 DYSURIA: Primary | ICD-10-CM

## 2021-01-01 DIAGNOSIS — R59.0 RETROPERITONEAL LYMPHADENOPATHY: ICD-10-CM

## 2021-01-01 DIAGNOSIS — N20.0 RENAL STONE: Primary | ICD-10-CM

## 2021-01-01 DIAGNOSIS — N39.0 URINARY TRACT INFECTION: ICD-10-CM

## 2021-01-01 LAB
ALBUMIN SERPL BCP-MCNC: 3.9 G/DL (ref 3.5–5)
ALP SERPL-CCNC: 77 U/L (ref 46–116)
ALT SERPL W P-5'-P-CCNC: 16 U/L (ref 12–78)
ANION GAP SERPL CALCULATED.3IONS-SCNC: 9 MMOL/L (ref 4–13)
AST SERPL W P-5'-P-CCNC: 12 U/L (ref 5–45)
BACTERIA UR QL AUTO: ABNORMAL /HPF
BASOPHILS # BLD AUTO: 0.03 THOUSANDS/ΜL (ref 0–0.1)
BASOPHILS NFR BLD AUTO: 1 % (ref 0–1)
BILIRUB DIRECT SERPL-MCNC: 0.18 MG/DL (ref 0–0.2)
BILIRUB SERPL-MCNC: 0.7 MG/DL (ref 0.2–1)
BILIRUB UR QL STRIP: NEGATIVE
BUN SERPL-MCNC: 9 MG/DL (ref 5–25)
CALCIUM SERPL-MCNC: 8.4 MG/DL (ref 8.3–10.1)
CHLORIDE SERPL-SCNC: 96 MMOL/L (ref 100–108)
CLARITY UR: CLEAR
CO2 SERPL-SCNC: 28 MMOL/L (ref 21–32)
COLOR UR: ABNORMAL
CREAT SERPL-MCNC: 0.85 MG/DL (ref 0.6–1.3)
EOSINOPHIL # BLD AUTO: 0.07 THOUSAND/ΜL (ref 0–0.61)
EOSINOPHIL NFR BLD AUTO: 1 % (ref 0–6)
ERYTHROCYTE [DISTWIDTH] IN BLOOD BY AUTOMATED COUNT: 12.1 % (ref 11.6–15.1)
GFR SERPL CREATININE-BSD FRML MDRD: 84 ML/MIN/1.73SQ M
GLUCOSE SERPL-MCNC: 134 MG/DL (ref 65–140)
GLUCOSE UR STRIP-MCNC: ABNORMAL MG/DL
HCT VFR BLD AUTO: 39.8 % (ref 36.5–49.3)
HGB BLD-MCNC: 13.4 G/DL (ref 12–17)
HGB UR QL STRIP.AUTO: NEGATIVE
IMM GRANULOCYTES # BLD AUTO: 0.01 THOUSAND/UL (ref 0–0.2)
IMM GRANULOCYTES NFR BLD AUTO: 0 % (ref 0–2)
KETONES UR STRIP-MCNC: ABNORMAL MG/DL
LEUKOCYTE ESTERASE UR QL STRIP: ABNORMAL
LYMPHOCYTES # BLD AUTO: 1.03 THOUSANDS/ΜL (ref 0.6–4.47)
LYMPHOCYTES NFR BLD AUTO: 17 % (ref 14–44)
MCH RBC QN AUTO: 29.5 PG (ref 26.8–34.3)
MCHC RBC AUTO-ENTMCNC: 33.7 G/DL (ref 31.4–37.4)
MCV RBC AUTO: 88 FL (ref 82–98)
MONOCYTES # BLD AUTO: 0.49 THOUSAND/ΜL (ref 0.17–1.22)
MONOCYTES NFR BLD AUTO: 8 % (ref 4–12)
NEUTROPHILS # BLD AUTO: 4.52 THOUSANDS/ΜL (ref 1.85–7.62)
NEUTS SEG NFR BLD AUTO: 73 % (ref 43–75)
NITRITE UR QL STRIP: POSITIVE
NON-SQ EPI CELLS URNS QL MICRO: ABNORMAL /HPF
NRBC BLD AUTO-RTO: 0 /100 WBCS
OTHER STN SPEC: ABNORMAL
PH UR STRIP.AUTO: 5 [PH]
PLATELET # BLD AUTO: 157 THOUSANDS/UL (ref 149–390)
PMV BLD AUTO: 9.4 FL (ref 8.9–12.7)
POST-VOID RESIDUAL VOLUME, ML POC: 175 ML
POTASSIUM SERPL-SCNC: 4.1 MMOL/L (ref 3.5–5.3)
PROT SERPL-MCNC: 7 G/DL (ref 6.4–8.2)
PROT UR STRIP-MCNC: ABNORMAL MG/DL
RBC # BLD AUTO: 4.55 MILLION/UL (ref 3.88–5.62)
RBC #/AREA URNS AUTO: ABNORMAL /HPF
SL AMB  POCT GLUCOSE, UA: NORMAL
SL AMB LEUKOCYTE ESTERASE,UA: NORMAL
SL AMB POCT BILIRUBIN,UA: NORMAL
SL AMB POCT BLOOD,UA: NORMAL
SL AMB POCT CLARITY,UA: CLEAR
SL AMB POCT COLOR,UA: YELLOW
SL AMB POCT KETONES,UA: NORMAL
SL AMB POCT NITRITE,UA: NORMAL
SL AMB POCT PH,UA: 6
SL AMB POCT SPECIFIC GRAVITY,UA: 1.02
SL AMB POCT URINE PROTEIN: NORMAL
SL AMB POCT UROBILINOGEN: NORMAL
SODIUM SERPL-SCNC: 133 MMOL/L (ref 136–145)
SP GR UR STRIP.AUTO: 1.01 (ref 1–1.03)
UROBILINOGEN UR QL STRIP.AUTO: >=8 E.U./DL
WBC # BLD AUTO: 6.15 THOUSAND/UL (ref 4.31–10.16)
WBC #/AREA URNS AUTO: ABNORMAL /HPF

## 2021-01-01 PROCEDURE — 80048 BASIC METABOLIC PNL TOTAL CA: CPT | Performed by: PHYSICIAN ASSISTANT

## 2021-01-01 PROCEDURE — 99285 EMERGENCY DEPT VISIT HI MDM: CPT | Performed by: PHYSICIAN ASSISTANT

## 2021-01-01 PROCEDURE — 99284 EMERGENCY DEPT VISIT MOD MDM: CPT

## 2021-01-01 PROCEDURE — 36415 COLL VENOUS BLD VENIPUNCTURE: CPT | Performed by: PHYSICIAN ASSISTANT

## 2021-01-01 PROCEDURE — 80076 HEPATIC FUNCTION PANEL: CPT | Performed by: PHYSICIAN ASSISTANT

## 2021-01-01 PROCEDURE — 74176 CT ABD & PELVIS W/O CONTRAST: CPT

## 2021-01-01 PROCEDURE — 99214 OFFICE O/P EST MOD 30 MIN: CPT | Performed by: PHYSICIAN ASSISTANT

## 2021-01-01 PROCEDURE — 96374 THER/PROPH/DIAG INJ IV PUSH: CPT

## 2021-01-01 PROCEDURE — 81001 URINALYSIS AUTO W/SCOPE: CPT | Performed by: EMERGENCY MEDICINE

## 2021-01-01 PROCEDURE — 85025 COMPLETE CBC W/AUTO DIFF WBC: CPT | Performed by: PHYSICIAN ASSISTANT

## 2021-01-01 PROCEDURE — 51798 US URINE CAPACITY MEASURE: CPT | Performed by: PHYSICIAN ASSISTANT

## 2021-01-01 PROCEDURE — G1004 CDSM NDSC: HCPCS

## 2021-01-01 PROCEDURE — 81003 URINALYSIS AUTO W/O SCOPE: CPT | Performed by: PHYSICIAN ASSISTANT

## 2021-01-01 RX ORDER — CEPHALEXIN 250 MG/1
500 CAPSULE ORAL ONCE
Status: COMPLETED | OUTPATIENT
Start: 2021-01-01 | End: 2021-01-01

## 2021-01-01 RX ORDER — CEPHALEXIN 500 MG/1
500 CAPSULE ORAL EVERY 6 HOURS SCHEDULED
Qty: 28 CAPSULE | Refills: 0 | Status: SHIPPED | OUTPATIENT
Start: 2021-01-01 | End: 2021-01-01

## 2021-01-01 RX ADMIN — CEPHALEXIN 500 MG: 250 CAPSULE ORAL at 21:16

## 2021-01-01 RX ADMIN — MORPHINE SULFATE 2 MG: 2 INJECTION, SOLUTION INTRAMUSCULAR; INTRAVENOUS at 20:12

## 2021-02-19 DIAGNOSIS — N20.0 NEPHROLITHIASIS: ICD-10-CM

## 2021-02-19 RX ORDER — ALLOPURINOL 100 MG/1
100 TABLET ORAL DAILY
Qty: 90 TABLET | Refills: 2 | Status: SHIPPED | OUTPATIENT
Start: 2021-02-19 | End: 2021-03-03 | Stop reason: SDUPTHER

## 2021-02-19 NOTE — TELEPHONE ENCOUNTER
An Auto-fax Refill Request for Allopurinol 100mg was received from [address] [place] [address] mail order pharmacy  The patient was last seen on 8/27/20 by Dr Sánchez German in the [facility] location; continuation of the medication was authorized at that time    Request for same, 90 day supply with 2 refills was queued and forwarded to the Advanced Practitioner covering the [facility] location for approval

## 2021-03-02 NOTE — PROGRESS NOTES
Assessment and plan:       1  Malignant neoplasm of the prostate  -testosterone 154 (02/26/2021)  -PSA 5 11 (02/26/2021), 5 2 to (08/24/2020), 5 41 (04/15/2020), 4 85 (12/31/2019), 6 72 (09/2019)  -has been treated with intermittent androgen deprivation therapy    2  Nocturia  -4-5 times  -2 times and occasionally 3 times  -continue VESIcare 10 mg daily  -drinks 3-4 quarts of water per day and orange juice  -advised to stop drinking 2 hours before bed    3  Nephrolithiasis  -uric acid stones  -continue allopurinol  -ct scan and US in 2020- 8mm stone lower pole of right kidney    KARRIE Easton    History of Present Illness     Nury Garcia is a 67 y o  male followed by Dr Elba Simms for adenocarcinoma of the prostate treated with intermittent androgen deprivation therapy presents with a PSA of 5 11 down from 5 4  He is voiding adequately with a good stream does note nocturia noting that it is down to twice nightly (he stopped taking VESIcare and it increased up to 4-5 times per night)  He denies dysuria gross hematuria  He denies any significant abdominal or flank pain although he does have uric acid nephrolithiasis on allopurinol  The patient did undergo imaging in 2020 including CT stone study as well as renal ultrasound  He has no evidence of upper tract obstruction, does have an 8 mm nonobstructing calculus in the lower pole the right kidney      Laboratory     Lab Results   Component Value Date    BUN 14 02/24/2020    CREATININE 0 90 02/24/2020       No components found for: GFR    Lab Results   Component Value Date    CALCIUM 9 5 02/24/2020    K 4 1 02/24/2020    CO2 29 02/24/2020    CL 98 (L) 02/24/2020       Lab Results   Component Value Date    WBC 4 82 02/24/2020    HGB 14 0 02/24/2020    HCT 43 4 02/24/2020    MCV 90 02/24/2020     02/24/2020       Lab Results   Component Value Date    PSA 3 1 08/20/2018       No results found for this or any previous visit (from the past 1 hour(s))  @RESULT(URINEMICROSCOPIC)@    @RESULT(URINECULTURE)@    Radiology       Review of Systems     Review of Systems   Constitutional: Negative for activity change, appetite change, chills, fatigue, fever and unexpected weight change  HENT: Negative for facial swelling  Eyes: Negative for discharge  Respiratory: Negative  Negative for cough and shortness of breath  Cardiovascular: Negative for chest pain and leg swelling  Gastrointestinal: Negative  Negative for abdominal distention, abdominal pain, constipation, diarrhea, nausea and vomiting  Endocrine: Negative  Genitourinary: Negative  Negative for decreased urine volume, difficulty urinating, dysuria, enuresis, flank pain, frequency, genital sores, hematuria and urgency  Urgency improved, nocturia 2-3 times per night improved since starting VESIcare   Musculoskeletal: Negative for back pain and myalgias  Skin: Negative for pallor and rash  Allergic/Immunologic: Negative  Negative for immunocompromised state  Neurological: Negative for facial asymmetry and speech difficulty  Psychiatric/Behavioral: Negative for agitation and confusion  Allergies     Allergies   Allergen Reactions    Augmentin [Amoxicillin-Pot Clavulanate] Diarrhea    Ceftin [Cefuroxime]     Ciprofloxacin     Fexofenadine     Provigil [Modafinil]     Sulfa Antibiotics        Physical Exam     Physical Exam  Constitutional:       General: He is not in acute distress  Appearance: Normal appearance  He is not ill-appearing, toxic-appearing or diaphoretic  HENT:      Head: Normocephalic and atraumatic  Eyes:      General: No scleral icterus  Cardiovascular:      Rate and Rhythm: Normal rate  Pulmonary:      Effort: Pulmonary effort is normal  No respiratory distress  Abdominal:      General: Abdomen is flat  There is no distension  Palpations: Abdomen is soft  Tenderness: There is no abdominal tenderness   There is no right CVA tenderness, left CVA tenderness, guarding or rebound  Musculoskeletal:         General: No swelling  Right lower leg: No edema  Left lower leg: No edema  Skin:     General: Skin is warm and dry  Coloration: Skin is not jaundiced or pale  Findings: No rash  Neurological:      General: No focal deficit present  Mental Status: He is alert and oriented to person, place, and time  Gait: Gait normal    Psychiatric:         Mood and Affect: Mood normal          Behavior: Behavior normal          Thought Content:  Thought content normal          Judgment: Judgment normal          Vital Signs     Vitals:    03/03/21 1340   BP: 130/80   Weight: 93 kg (205 lb)   Height: 5' 9" (1 753 m)       Current Medications       Current Outpatient Medications:     allopurinol (ZYLOPRIM) 100 mg tablet, Take 1 tablet (100 mg total) by mouth daily, Disp: 90 tablet, Rfl: 3    aspirin (ECOTRIN LOW STRENGTH) 81 mg EC tablet, Take 81 mg by mouth daily, Disp: , Rfl:     azelastine (ASTELIN) 0 1 % nasal spray, 2 sprays into each nostril 3 (three) times a day Use in each nostril as directed  , Disp: , Rfl:     B-D ULTRAFINE III SHORT PEN 31G X 8 MM MISC, , Disp: , Rfl:     baclofen 10 mg tablet, baclofen 10 mg tablet, Disp: , Rfl:     clonazePAM (KlonoPIN) 0 5 mg tablet, Take 0 5 mg by mouth 4 (four) times a day , Disp: , Rfl:     diclofenac sodium (VOLTAREN) 1 %, Apply 2mg 2 times every day, Disp: , Rfl:     escitalopram (LEXAPRO) 20 mg tablet, Take 20 mg by mouth daily, Disp: , Rfl:     Fexofenadine HCl (MUCINEX ALLERGY PO), Take 1-2 tablets by mouth as needed  , Disp: , Rfl:     flecainide (TAMBOCOR) 50 mg tablet, 50 mg Every 12 hours , Disp: , Rfl:     glimepiride (AMARYL) 2 mg tablet, Take 4 mg by mouth every morning before breakfast , Disp: , Rfl:     Hydrocodone-Acetaminophen (VICODIN PO), Take by mouth daily, Disp: , Rfl:     hydrocortisone 2 5 % cream, , Disp: , Rfl:     ibuprofen (Advil) 200 mg tablet, Take by mouth every 6 (six) hours as needed for mild pain, Disp: , Rfl:     ketoconazole (NIZORAL) 2 % cream, , Disp: , Rfl:     Liraglutide (VICTOZA SC), Inject under the skin daily, Disp: , Rfl:     LORazepam (ATIVAN) 0 5 mg tablet, Take 0 5 mg by mouth every 8 (eight) hours as needed for anxiety, Disp: , Rfl:     metFORMIN (GLUCOPHAGE) 1000 MG tablet, 1,000 mg 2 (two) times a day with meals , Disp: , Rfl:     metFORMIN (GLUCOPHAGE) 500 mg tablet, Take 500 mg by mouth 2 (two) times a day with meals , Disp: , Rfl:     montelukast (SINGULAIR) 10 mg tablet, Daily, Disp: , Rfl:     Multiple Vitamins-Minerals (MULTIVITAMIN ADULT PO), Take by mouth, Disp: , Rfl:     mupirocin (BACTROBAN) 2 % ointment, Apply topically 3 (three) times a day, Disp: , Rfl:     Omeprazole 20 MG TBEC, daily as needed  , Disp: , Rfl:     Semaglutide,0 25 or 0 5MG/DOS, (Ozempic, 0 25 or 0 5 MG/DOSE,) 2 MG/1 5ML SOPN, Inject 0 5 mg under the skin, Disp: , Rfl:     simvastatin (ZOCOR) 20 mg tablet, 20 mg daily at bedtime , Disp: , Rfl:     solifenacin (VESICARE) 10 MG tablet, Take 1 tablet (10 mg total) by mouth daily at bedtime, Disp: 90 tablet, Rfl: 3    traMADol (ULTRAM) 50 mg tablet, 50 mg 2 (two) times a day , Disp: , Rfl:     VICTOZA injection, , Disp: , Rfl:     zolpidem (AMBIEN) 10 mg tablet, Take by mouth daily at bedtime, Disp: , Rfl:     naproxen (NAPROSYN) 375 mg tablet, Take 1 tablet (375 mg total) by mouth 2 (two) times a day with meals for 5 days, Disp: 10 tablet, Rfl: 0    valACYclovir (VALTREX) 1,000 mg tablet, Take 1,000 mg by mouth Three times a day, Disp: , Rfl:     Active Problems     Patient Active Problem List   Diagnosis    Mal[facility] )    Arthritis    Dysuria    Bladder pain    Prostate cancer ([facility] )    Increased urinary frequency       Past Medical History     Past Medical History:   Diagnosis Date    Arthritis     Chronic bladder pain 06/05/2017    Chronic pain     Diabetes mellitus ([facility] )     Elevated PSA 2016    Frequency of urination     Hematuria, gross 06/15/2017    Hypercholesterolemia     Hyperlipidemia     Kidney stones 11/01/2016    Lymphoma ([facility] )     [facility] 04/20/2017    Pain in hip     Skin cancer     Sleep apnea        Surgical History     Past Surgical History:   Procedure Laterality Date    CYSTOSCOPY  06/09/2017    MN CYSTOURETHROSCOPY,URETER CATHETER Bilateral 8/29/2018    Procedure: CYSTOSCOPY,  b/l retrogrades pyelogram;  Surgeon: Abdiel Mccormack MD;  Location: [facility];  Service: Urology    PROSTATE BIOPSY  04/06/2017    SINUS SURGERY  2010    SKIN CANCER EXCISION         Family History     Family History   Problem Relation Age of Onset    Heart disease Mother        Social History     Social History     Social History     Tobacco Use   Smoking Status Never Smoker   Smokeless Tobacco Never Used       Past Surgical History:   Procedure Laterality Date    CYSTOSCOPY  06/09/2017    MN CYSTOURETHROSCOPY,URETER CATHETER Bilateral 8/29/2018    Procedure: CYSTOSCOPY,  b/l retrogrades pyelogram;  Surgeon: Abdiel Mccormack MD;  Location: [facility];  Service: Urology    PROSTATE BIOPSY  04/06/2017    SINUS SURGERY  2010    SKIN CANCER EXCISION           The following portions of the patient's history were reviewed and updated as appropriate: allergies, current medications, past family history, past medical history, past social history, past surgical history and problem list    Please note :  Voice dictation software has been used to create this document  There may be inadvertent transcription errors      [id number] [address]

## 2021-03-03 ENCOUNTER — OFFICE VISIT (OUTPATIENT)
Dept: UROLOGY | Facility: MEDICAL CENTER | Age: 76
End: 2021-03-03
Payer: MEDICARE

## 2021-03-03 VITALS
BODY MASS INDEX: 30.36 KG/M2 | SYSTOLIC BLOOD PRESSURE: 130 MMHG | DIASTOLIC BLOOD PRESSURE: 80 MMHG | HEIGHT: 69 IN | WEIGHT: 205 LBS

## 2021-03-03 DIAGNOSIS — C61 MALIGNANT NEOPLASM OF PROSTATE (HCC): Primary | ICD-10-CM

## 2021-03-03 DIAGNOSIS — R35.0 FREQUENCY OF URINATION: ICD-10-CM

## 2021-03-03 DIAGNOSIS — N20.0 NEPHROLITHIASIS: ICD-10-CM

## 2021-03-03 PROCEDURE — 99213 OFFICE O/P EST LOW 20 MIN: CPT | Performed by: NURSE PRACTITIONER

## 2021-03-03 RX ORDER — SOLIFENACIN SUCCINATE 10 MG/1
10 TABLET, FILM COATED ORAL
Qty: 90 TABLET | Refills: 3 | Status: SHIPPED | OUTPATIENT
Start: 2021-03-03

## 2021-03-03 RX ORDER — ALLOPURINOL 100 MG/1
100 TABLET ORAL DAILY
Qty: 90 TABLET | Refills: 3 | Status: SHIPPED | OUTPATIENT
Start: 2021-03-03

## 2021-05-05 NOTE — TELEPHONE ENCOUNTER
Patient seen by Marcello Goyal in [facility]    Patient called stating he wants to know if he is able to take Prostent   He saw it on a infomercial   He was impressed by the information given and he is getting up a few time a night to urinated and it says this will help  He ordered a free bottle   He is to take two a day  He wants to know if its ok

## 2021-05-05 NOTE — TELEPHONE ENCOUNTER
Call placed to patient and spoke with his wife  She informed me that patient is not home at this time  She will have him contact the office tomorrow with the exact spelling of this medication

## 2021-05-06 NOTE — TELEPHONE ENCOUNTER
Call placed to patient and spoke with him  He informed me that he cannot remember the name of the medication but he ordered a sample  Once it arrives he will contact the office with the exact name and spelling of the medication for CRNP to review and advise if it is safe to take  Will await patient's call  Back with further information

## 2021-05-07 NOTE — TELEPHONE ENCOUNTER
Discussed with Dr Jaycee Chase we are unsure some of the contents in this and how would affect him  We do not think it would be very helpful for his symptoms

## 2021-05-07 NOTE — TELEPHONE ENCOUNTER
Patient called back stating the medication is called Pros Vent Ultra Tabs  Patient stated the commercial was "very professional"

## 2021-05-10 NOTE — TELEPHONE ENCOUNTER
Call placed to patient and spoke with him  Informed him of the information of Dr Last Yepez and KARRIE at this time  Pt understands and was thankful for the call

## 2021-07-28 NOTE — INTERVAL H&P NOTE
H&P reviewed  After examining the patient I find no changes in the patients condition since the H&P had been written  Both arterial and venous

## 2021-08-02 NOTE — TELEPHONE ENCOUNTER
Patient has an appointment on 9/29/2021 at 9:30am would like a call back to discuss the location of the appointment

## 2021-09-07 NOTE — TELEPHONE ENCOUNTER
Would like a call back to get PSA results  Has been having some pain and wants to know what medicine he can take

## 2021-09-07 NOTE — TELEPHONE ENCOUNTER
Patient managed by Dr Markell Ferro at the [facility] office  He has a history of prostate cancer and nocturia  Last seen in the office on 3-3-2021    Patient called with complaints of burning with urination, increased urgency and frequency at night  Pt is waking up 5-6 times a night to urinate  Pt is also having flank pain that radiates to his lower abdomen  Pt will be going for urine testing today  He will follow up with the office tomorrow or Thursday to see if results are final and if medication is needed  Recommended that patient try OTC AZO once urine sample is submitted to help with urinary tract relief  Pt is also requesting results of recent PSA and any recommendations that the provider may have  Pt will also need a refill sent to [facility] for Vesicare  He only has a few pills left,     Orders placed for UA C&S to rule out urinary tract infection  Office will follow up as results become available usually within 48-72 hours  Patient encouraged to hydrate well with water and avoid bladder irritants  Patient instructed to call back with worsening symptoms, fever, chills, blood in the urine or difficulty urinating  Routing to the provider for review  ,

## 2021-09-07 NOTE — TELEPHONE ENCOUNTER
Pt calling because staff member at the lab is telling him they don't have orders for UA/C&S  Spoke to staff member  Pt is not at a  lab  Orders need to be faxed to [phone]  Attempt[facility] to [facility]ical staff to do this, but received an unidentified VMbox  Did not leave message due to security purposes

## 2021-09-08 NOTE — ED PROVIDER NOTES
History  Chief Complaint   Patient presents with    Blood in Urine     Patient reports increase in urination frequency and blood in urine  40-year-old male with relevant past medical history significant for type 2 diabetes mellitus, hypertension, hyperlipidemia, prostate cancer status post androgen deprivation therapy, nephrolithiasis who presents to the emergency department for complaint of urinary frequency, hematuria, right flank, and right groin pain starting yesterday  He endorses increased nocturia, usually has 3 episodes  Denies dysuria, urinary retention, fever, nausea or vomiting, confusion, malaise, increased fatigue, loss of appetite  Of note, patient had imaging done in  showing an 8 mm stone in the lower pole of the right kidney  Prior to Admission Medications   Prescriptions Last Dose Informant Patient Reported? Taking?    B-D ULTRAFINE III SHORT PEN 31G X 8 MM MISC   Yes No   Fexofenadine HCl (MUCINEX ALLERGY PO)  Self Yes No   Sig: Take 1-2 tablets by mouth as needed     Hydrocodone-Acetaminophen (VICODIN PO)  Self Yes No   Sig: Take by mouth daily   LORazepam (ATIVAN) 0 5 mg tablet  Self Yes No   Sig: Take 0 5 mg by mouth every 8 (eight) hours as needed for anxiety   Liraglutide (VICTOZA SC)  Self Yes No   Sig: Inject under the skin daily   Multiple Vitamins-Minerals (MULTIVITAMIN ADULT PO)  Self Yes No   Sig: Take by mouth   Omeprazole 20 MG TBEC  Self Yes No   Sig: daily as needed     Semaglutide,0 25 or 0 5MG/DOS, (Ozempic, 0 25 or 0 5 MG/DOSE,) 2 MG/1 5ML SOPN   Yes No   Sig: Inject 0 5 mg under the skin   VICTOZA injection   Yes No   allopurinol (ZYLOPRIM) 100 mg tablet   No No   Sig: Take 1 tablet (100 mg total) by mouth daily   aspirin (ECOTRIN LOW STRENGTH) 81 mg EC tablet   Yes No   Sig: Take 81 mg by mouth daily   azelastine (ASTELIN) 0 1 % nasal spray  Self Yes No   Si sprays into each nostril 3 (three) times a day Use in each nostril as directed     baclofen 10 mg tablet   Yes No   Sig: baclofen 10 mg tablet   clonazePAM (KlonoPIN) 0 5 mg tablet  Self Yes No   Sig: Take 0 5 mg by mouth 4 (four) times a day    diclofenac sodium (VOLTAREN) 1 %  Self Yes No   Sig: Apply 2mg 2 times every day   escitalopram (LEXAPRO) 20 mg tablet   Yes No   Sig: Take 20 mg by mouth daily   flecainide (TAMBOCOR) 50 mg tablet   Yes No   Si mg Every 12 hours    glimepiride (AMARYL) 2 mg tablet  Self Yes No   Sig: Take 4 mg by mouth every morning before breakfast    hydrocortisone 2 5 % cream   Yes No   ibuprofen (Advil) 200 mg tablet   Yes No   Sig: Take by mouth every 6 (six) hours as needed for mild pain   ketoconazole (NIZORAL) 2 % cream   Yes No   metFORMIN (GLUCOPHAGE) 1000 MG tablet   Yes No   Si,000 mg 2 (two) times a day with meals    metFORMIN (GLUCOPHAGE) 500 mg tablet  Self Yes No   Sig: Take 500 mg by mouth 2 (two) times a day with meals    montelukast (SINGULAIR) 10 mg tablet  Self Yes No   Sig: Daily   mupirocin (BACTROBAN) 2 % ointment   Yes No   Sig: Apply topically 3 (three) times a day   naproxen (NAPROSYN) 375 mg tablet   No No   Sig: Take 1 tablet (375 mg total) by mouth 2 (two) times a day with meals for 5 days   simvastatin (ZOCOR) 20 mg tablet   Yes No   Si mg daily at bedtime    solifenacin (VESICARE) 10 MG tablet   No No   Sig: Take 1 tablet (10 mg total) by mouth daily at bedtime   traMADol (ULTRAM) 50 mg tablet   Yes No   Si mg 2 (two) times a day    valACYclovir (VALTREX) 1,000 mg tablet   Yes No   Sig: Take 1,000 mg by mouth Three times a day   zolpidem (AMBIEN) 10 mg tablet  Self Yes No   Sig: Take by mouth daily at bedtime      Facility-Administered Medications: None       Past Medical History:   Diagnosis Date    Arthritis     Chronic bladder pain 2017    Chronic pain     [facility] [address] [facility]     Elevated PSA     Frequency of urination     Hematuria, gross 06/15/2017    Hypercholesterolemia     Hyperlipidemia     Kidney stones 11/01/2016    [facility] [address] [facility]     Malignant neoplasm of prostate ([facility] [address] ) 04/20/2017    Pain in hip     Skin cancer     Sleep apnea        Past Surgical History:   Procedure Laterality Date    CYSTOSCOPY  06/09/2017    PA CYSTOURETHROSCOPY,URETER CATHETER Bilateral 8/29/2018    Procedure: CYSTOSCOPY,  b/l retrogrades pyelogram;  Surgeon: Hallie Quinn MD;  Location: [facility];  Service: Urology    PROSTATE BIOPSY  04/06/2017    SINUS SURGERY  2010    SKIN CANCER EXCISION         Family History   Problem Relation Age of Onset    Heart disease Mother      I have reviewed and agree with the history as documented  E-Cigarette/Vaping     E-Cigarette/Vaping Substances     Social History     Tobacco Use    Smoking status: Never Smoker    Smokeless tobacco: Never Used   Substance Use Topics    Alcohol use: No    Drug use: No       Review of Systems   Constitutional: Negative for activity change, appetite change, chills, fatigue and fever  Respiratory: Negative for shortness of breath  Cardiovascular: Negative for chest pain  Gastrointestinal: Negative for abdominal distention, abdominal pain, constipation, nausea and vomiting  Genitourinary: Positive for flank pain, frequency and hematuria  Negative for decreased urine volume, difficulty urinating, dysuria, testicular pain and urgency  Musculoskeletal: Negative for back pain and myalgias  Skin: Negative for color change and rash  Neurological: Negative for dizziness, weakness, light-headedness and headaches  Hematological: Negative for adenopathy  All other systems reviewed and are negative  Physical Exam  Physical Exam  Vitals reviewed  Constitutional:       General: He is awake  He is not in acute distress  Appearance: Normal appearance  He is well-developed  He is not ill-appearing or toxic-appearing  HENT:      Head: Normocephalic and atraumatic  Mouth/Throat:      Lips: Pink        Mouth: Mucous membranes are moist       Pharynx: Oropharynx is clear  Uvula midline  Eyes:      Extraocular Movements: Extraocular movements intact  Conjunctiva/sclera: Conjunctivae normal       Pupils: Pupils are equal, round, and reactive to light  Cardiovascular:      Rate and Rhythm: Normal rate and regular rhythm  Pulses: Normal pulses  Pulmonary:      Effort: Pulmonary effort is normal       Breath sounds: Normal breath sounds and air entry  Abdominal:      General: Bowel sounds are normal  There is no distension  Palpations: Abdomen is soft  There is no hepatomegaly, splenomegaly or mass  Tenderness: There is abdominal tenderness in the right lower quadrant  There is right CVA tenderness  There is no left CVA tenderness, guarding or rebound  Hernia: No hernia is present  Musculoskeletal:         General: Normal range of motion  Cervical back: Full passive range of motion without pain, normal range of motion and neck supple  Skin:     General: Skin is warm  Capillary Refill: Capillary refill takes less than 2 seconds  Findings: No erythema, lesion or rash  Neurological:      Mental Status: He is alert and oriented to person, place, and time  Psychiatric:         Behavior: Behavior is cooperative           Vital Signs  ED Triage Vitals   Temperature Pulse Respirations Blood Pressure SpO2   09/08/21 1734 09/08/21 1730 09/08/21 1730 09/08/21 1730 09/08/21 1730   98 2 °F (36 8 °C) 74 16 120/71 94 %      Temp Source Heart Rate Source Patient Position - Orthostatic VS BP Location FiO2 (%)   09/08/21 1734 09/08/21 2022 -- 09/08/21 2022 --   Oral Monitor  Right arm       Pain Score       09/08/21 2012       4           Vitals:    09/08/21 1730 09/08/21 2022 09/08/21 2116   BP: 120/71 132/68 130/70   Pulse: 74 62 60         Visual Acuity      ED Medications  Medications   morphine injection 2 mg (2 mg Intravenous Given 9/8/21 2012)   cephalexin (KEFLEX) capsule 500 mg (500 mg Oral Given 9/8/21 2116)       Diagnostic Studies  Results Reviewed     Procedure Component Value Units Date/Time    Hepatic function panel [[id number]]  (Normal) Collected: 09/08/21 2011    Lab Status: Final result Specimen: Blood from Arm, Right Updated: 09/08/21 2041     Total Bilirubin 0 70 mg/dL      Bilirubin, Direct 0 18 mg/dL      Alkaline Phosphatase 77 U/L      AST 12 U/L      ALT 16 U/L      Total Protein 7 0 g/dL      Albumin 3 9 g/dL     Basic metabolic panel [[id number]]  (Abnormal) Collected: 09/08/21 2011    Lab Status: Final result Specimen: Blood from Arm, Right Updated: 09/08/21 2041     Sodium 133 mmol/L      Potassium 4 1 mmol/L      Chloride 96 mmol/L      CO2 28 mmol/L      ANION GAP 9 mmol/L      BUN 9 mg/dL      Creatinine 0 85 mg/dL      Glucose 134 mg/dL      Calcium 8 4 mg/dL      eGFR 84 ml/min/1 73sq m     Narrative:      [place] guidelines for Chronic Kidney Disease (CKD):     Stage 1 with normal or high GFR (GFR > 90 mL/min/1 73 square meters)    Stage 2 Mild CKD (GFR = 60-89 mL/min/1 73 square meters)    Stage 3A Moderate CKD (GFR = 45-59 mL/min/1 73 square meters)    Stage 3B Moderate CKD (GFR = 30-44 mL/min/1 73 square meters)    Stage 4 Severe CKD (GFR = 15-29 mL/min/1 73 square meters)    Stage 5 End Stage CKD (GFR <15 mL/min/1 73 square meters)  Note: GFR calculation is accurate only with a steady state creatinine    CBC and differential [[id number]] Collected: 09/08/21 2011    Lab Status: Final result Specimen: Blood from Arm, Right Updated: 09/08/21 2020     WBC 6 15 Thousand/uL      RBC 4 55 Million/uL      Hemoglobin 13 4 g/dL      Hematocrit 39 8 %      MCV 88 fL      MCH 29 5 pg      MCHC 33 7 g/dL      RDW 12 1 %      MPV 9 4 fL      Platelets 154 Thousands/uL      nRBC 0 /100 WBCs      Neutrophils Relative 73 %      Immat GRANS % 0 %      Lymphocytes Relative 17 %      Monocytes Relative 8 %      Eosinophils Relative 1 %      Basophils Relative 1 % Neutrophils Absolute 4 52 Thousands/µL      Immature Grans Absolute 0 01 Thousand/uL      Lymphocytes Absolute 1 03 Thousands/µL      Monocytes Absolute 0 49 Thousand/µL      Eosinophils Absolute 0 07 Thousand/µL      Basophils Absolute 0 03 Thousands/µL     Urine Microscopic [[id number]]  (Abnormal) Collected: 09/08/21 1735    Lab Status: Final result Specimen: Urine, Clean Catch Updated: 09/08/21 1840     RBC, UA 0-1 /hpf      WBC, UA 1-2 /hpf      Epithelial Cells Occasional /hpf      Bacteria, UA Occasional /hpf      OTHER OBSERVATIONS Renal Epithelial Cells Present    UA w Reflex to Microscopic w Reflex to Culture [[id number]]  (Abnormal) Collected: 09/08/21 1735    Lab Status: Final result Specimen: Urine, Clean Catch Updated: 09/08/21 1751     Color, UA Orange     Clarity, UA Clear     Specific Gravity, UA 1 010     pH, UA 5 0     Leukocytes, UA Trace     Nitrite, UA Positive     Protein, UA 30 (1+) mg/dl      Glucose,  (1/4%) mg/dl      Ketones, UA Trace mg/dl      Urobilinogen, UA >=8 0 E U /dl      Bilirubin, UA Negative     Blood, UA Negative                 CT renal stone study abdomen pelvis wo contrast   Final Result by Dorita Barrow MD (09/08 2055)   1  Worsening mesenteric and retroperitoneal ill-defined lymphadenopathy with surrounding stranding and no features to suggest an underlying infectious/inflammatory cause  Features suspicious for underlying malignancy including mesenteric lymphoma or    other etiologies  2   Nonobstructive 3 mm right renal calculus  Tiny foci of calcific debris layering in the bladder  I personally discussed this study with Willis Lomas MD on 9/8/2021 at 8:55 PM        Workstation performed: [id number]                    Procedures  Procedures         ED Course  ED Course as of Sep 18 0430   Wed Sep 08, 2021   2106 CT showing R renal calculus, no ureteral calculus  Nitrite +  Will treat with Keflex  Labs overall unremarkable, mild dehydration  Reviewed other incidental CT findings with patient at bedside, discussed importance or urology, PCP, and hematology/oncology follow up concerning lymph node enlargement  Patient tells me he has a hx of lymphoma, recently had labs done, and has an appt with his hematologist tomorrow  SBIRT 22yo+      Most Recent Value   SBIRT (24 yo +)   In order to provide better care to our patients, we are screening all of our patients for alcohol and drug use  Would it be okay to ask you these screening questions? No Filed at: 09/08/2021 2018                    MDM  Number of Diagnoses or Management Options  Mesenteric lymphadenopathy  Renal stone  Retroperitoneal lymphadenopathy  Urinary tract infection  Diagnosis management comments: Will proceed with labs, UA, CT to assess for UTI, pyelo, kidney stone  Amount and/or Complexity of Data Reviewed  Clinical lab tests: reviewed and ordered  Tests in the radiology section of CPT®: reviewed and ordered  Discussion of test results with the performing providers: yes  Decide to obtain previous medical records or to obtain history from someone other than the patient: yes  Obtain history from someone other than the patient: yes  Review and summarize past medical records: yes  Discuss the patient with other providers: yes  Independent visualization of images, tracings, or specimens: yes    Patient Progress  Patient progress: improved (See ED course note for dispo and plan  I reviewed and discussed all lab and imaging findings with the patient at bedside  I discussed emergency department return parameters  I answered any and all questions the patient had regarding emergency department course of evaluation and treatment   The patient verbalized understanding of and agreement with plan   )      Disposition  Final diagnoses:   Renal stone   Urinary tract infection   Mesenteric lymphadenopathy   Retroperitoneal lymphadenopathy     Time reflects when diagnosis was documented in both MDM as applicable and the Disposition within this note     Time User Action Codes Description Comment    9/8/2021  9:08 PM Jumana Conklin Add [N20 0] Renal stone     9/8/2021  9:08 PM Jumana Conklin Add [N39 0] Urinary tract infection     9/8/2021  9:09 PM Jumana Conklin Add [R59 0] Mesenteric lymphadenopathy     9/8/2021  9:09 PM Jumana Conklin Add [R59 0] Retroperitoneal lymphadenopathy       ED Disposition     ED Disposition Condition Date/Time Comment    Discharge Stable Wed Sep 8, 2021  9:09 PM Britton Dominique discharge to home/self care              Follow-up Information     Follow up With Specialties Details Why [address] [facility] Emergency Department Emergency Medicine Go to  If symptoms worsen [address] [facility] [address]  [facility] Emergency Department, [address] Jeanne Shahid MD Internal Medicine Call in 1 day For further evaluation [address] [address]  [address] [facility],  Internal Medicine Call in 1 day For further evaluation Rakan Perez [address]  [phone]       Haley Porter MD Urology Call in 1 day For further evaluation [facility] [address] [phone]             Discharge Medication List as of 9/8/2021  9:13 PM      START taking these medications    Details   cephalexin (KEFLEX) 500 mg capsule Take 1 capsule (500 mg total) by mouth every 6 (six) hours for 7 days, Starting Wed 9/8/2021, Until Wed 9/15/2021, Normal         CONTINUE these medications which have NOT CHANGED    Details   allopurinol (ZYLOPRIM) 100 mg tablet Take 1 tablet (100 mg total) by mouth daily, Starting Wed 3/3/2021, Normal      aspirin (ECOTRIN LOW STRENGTH) 81 mg EC tablet Take 81 mg by mouth daily, Historical Med      azelastine (ASTELIN) 0 1 % nasal spray 2 sprays into each nostril 3 (three) times a day Use in each nostril as directed  , Historical Med      B-D ULTRAFINE III SHORT PEN 31G X 8 MM MISC Starting Sat 9/22/2018, Historical Med      baclofen 10 mg tablet baclofen 10 mg tablet, Historical Med      clonazePAM (KlonoPIN) 0 5 mg tablet Take 0 5 mg by mouth 4 (four) times a day , Historical Med      diclofenac sodium (VOLTAREN) 1 % Apply 2mg 2 times every day, Historical Med      escitalopram (LEXAPRO) 20 mg tablet Take 20 mg by mouth daily, Historical Med      Fexofenadine HCl (MUCINEX ALLERGY PO) Take 1-2 tablets by mouth as needed  , Historical Med      flecainide (TAMBOCOR) 50 mg tablet 50 mg Every 12 hours , Historical Med      glimepiride (AMARYL) 2 mg tablet Take 4 mg by mouth every morning before breakfast , Historical Med      Hydrocodone-Acetaminophen (VICODIN PO) Take by mouth daily, Historical Med      hydrocortisone 2 5 % cream Starting Sat 7/13/2019, Historical Med      ibuprofen (Advil) 200 mg tablet Take by mouth every 6 (six) hours as needed for mild pain, Historical Med      ketoconazole (NIZORAL) 2 % cream Starting Sat 7/13/2019, Historical Med      !!  Liraglutide (VICTOZA SC) Inject under the skin daily, Historical Med      LORazepam (ATIVAN) 0 5 mg tablet Take 0 5 mg by mouth every 8 (eight) hours as needed for anxiety, Historical Med      !! metFORMIN (GLUCOPHAGE) 1000 MG tablet 1,000 mg 2 (two) times a day with meals , Starting Fri 4/26/2019, Historical Med      !! metFORMIN (GLUCOPHAGE) 500 mg tablet Take 500 mg by mouth 2 (two) times a day with meals , Historical Med      montelukast (SINGULAIR) 10 mg tablet Daily, Historical Med      Multiple Vitamins-Minerals (MULTIVITAMIN ADULT PO) Take by mouth, Historical Med      mupirocin (BACTROBAN) 2 % ointment Apply topically 3 (three) times a day, Historical Med      naproxen (NAPROSYN) 375 mg tablet Take 1 tablet (375 mg total) by mouth 2 (two) times a day with meals for 5 days, Starting Fri 6/12/2020, Until Wed 6/17/2020, Normal      Omeprazole 20 MG TBEC daily as needed  , Historical Med      Semaglutide,0 25 or 0 5MG/DOS, (Ozempic, 0 25 or 0 5 MG/DOSE,) 2 MG/1 5ML SOPN Inject 0 5 mg under the skin, Starting Fri 4/24/2020, Historical Med      simvastatin (ZOCOR) 20 mg tablet 20 mg daily at bedtime , Starting Mon 11/5/2018, Historical Med      solifenacin (VESICARE) 10 MG tablet Take 1 tablet (10 mg total) by mouth daily at bedtime, Starting Wed 3/3/2021, Normal      traMADol (ULTRAM) 50 mg tablet 50 mg 2 (two) times a day , Starting Tue 11/20/2018, Historical Med      valACYclovir (VALTREX) 1,000 mg tablet Take 1,000 mg by mouth Three times a day, Starting Sat 6/20/2020, Until Tue 6/30/2020, Historical Med      !! VICTOZA injection Starting Sat 9/22/2018, Historical Med      zolpidem (AMBIEN) 10 mg tablet Take by mouth daily at bedtime, Historical Med       !! - Potential duplicate medications found  Please discuss with provider  No discharge procedures on file      PDMP Review     None          ED Provider  Electronically Signed by           Konrad Gilliland PA-C  09/18/21 [id number]

## 2021-09-09 NOTE — DISCHARGE INSTRUCTIONS
The following findings require follow up:  Radiographic finding   Finding:  Mesenteric and retroperitoneal lymphadenopathy, worsening from prior study   Follow up required:  Urology, hematology oncology, PCP   Follow up should be done within 1 week(s)

## 2021-09-29 PROBLEM — E11.9 CONTROLLED TYPE 2 DIABETES MELLITUS WITHOUT COMPLICATION, WITHOUT LONG-TERM CURRENT USE OF INSULIN (HCC): Status: ACTIVE | Noted: 2021-01-01

## 2021-09-29 NOTE — TELEPHONE ENCOUNTER
Good morning Dr Agustina Valladares! Pt of yours I wanted to touch base on  Hx of CaP treated with intermittent ADT  Looks like last injection was around 2017  These are his PSAs: 6 7 (8/31/21),  5 11 (02/26/2021), 5 2 to (08/24/2020), 5 41 (04/15/2020), 4 85 (12/31/2019), 6 72 (09/2019)  When his PSA was 6 72 in 2019, he did not receive any injections for this elevation  Wanted to check with you  Patient is okay with coming back for injection if needed  Thank you!

## 2021-09-29 NOTE — TELEPHONE ENCOUNTER
Good morning-it looks like he does not need anything right now  I would just recheck him in 6 months

## 2021-09-29 NOTE — TELEPHONE ENCOUNTER
Spoke to pt and advised of Dr Von Schwab recommendations in previous note  He is to follow up in six months with PSA

## 2021-09-29 NOTE — PROGRESS NOTES
9/29/2021      Chief Complaint   Patient presents with    Follow-up         Assessment and Plan    67 y o  male managed by Dr Angie Drummond     1  Prostate cancer   · PSA: 6 7 (8/31/21)  · Previous PSAs: 5 11 (02/26/2021), 5 2 to (08/24/2020), 5 41 (04/15/2020), 4 85 (12/31/2019), 6 72 (09/2019)  · Has been treated with intermittent ADT  · Will address with Dr Angie Drummond if patient should return for ADT injection  · Patient aware and agreeable to plan  2  Nephrolithiasis   · CT stone study 9/8/21 revealed 3 mm right nonobstructing renal calculi  · ED parameters reviewed  · Follow up in 1 year with imaging  3  Nocturia  · Managed well on VEsicare 10 mg PO daily  · Reviewed bladder irritants  · Encourage daily water intake of 40-60 oz earlier in the day and weaning fluids after dinner time  History of Present Illness  Vince Aguilar is a 67 y o  male here for evaluation of a prostate cancer, nephrolithiasis, and nocturia  Patient reports that approximately 2 weeks ago he had an episode of gross hematuria and presented to the emergency room  Was diagnosed with acute cystitis and discharged with Keflex 500 milligrams p o  q i d  x7 days  CT scan performed in the ED at this visit revealed worsening mesenteric and retroperitoneal ill-defined lymphadenopathy with surrounding stranding suspicious for underlying malignancy including mesenteric lymphoma or other etiologies  Patient states he has a PET scan ordered with his primary physician for further work up, as he does have a previous history of lymphoma  Also evident on the CT scan was a 3 millimeter right nonobstructive renal calculus  Patient states he has not experienced any subsequent episodes of gross hematuria following antibiotic course  He denies any constitutional symptoms including fevers, chills, diaphoresis, unexpected weight change, or long bone pain    He feels his urinary symptoms are well controlled on the VESIcare 10 milligrams p o  daily  He continues to have nocturia 3 times per night but he states this does not affect his quality of life  He denies sensation of incomplete bladder emptying or difficulty voiding  He occasionally experiences urinary urgency with occasional urge urinary incontinence  He denies any dysuria or daytime urinary frequency  He does try to remain well hydrated during the day with water  Review of Systems   Constitutional: Negative for chills, diaphoresis, fatigue, fever and unexpected weight change  Denies long bone pain  HENT: Negative  Respiratory: Negative  Cardiovascular: Negative  Gastrointestinal: Negative for abdominal pain, nausea and vomiting  Genitourinary: Positive for hematuria (2 weeks ago, none since Keflex for cystitisi) and urgency (Occasional, can be associated with urge urinary incontinence)  Negative for difficulty urinating, dysuria, flank pain, frequency, scrotal swelling and testicular pain  Nocturia 3x per night  Denies sensation of incomplete bladder emptying  Musculoskeletal: Negative  Skin: Negative  Neurological: Negative  Vitals  Vitals:    09/29/21 0931   BP: 120/60   BP Location: Left arm   Patient Position: Sitting   Cuff Size: Adult   Pulse: 62   Weight: 102 kg (225 lb)   Height: 5' 9" (1 753 m)       Physical Exam  Vitals reviewed  Constitutional:       General: He is not in acute distress  Appearance: Normal appearance  He is obese  He is not ill-appearing, toxic-appearing or diaphoretic  HENT:      Head: Normocephalic and atraumatic  Eyes:      Conjunctiva/sclera: Conjunctivae normal    Pulmonary:      Effort: Pulmonary effort is normal  No respiratory distress  Musculoskeletal:         General: Normal range of motion  Cervical back: Normal range of motion  Skin:     General: Skin is warm and dry  Neurological:      General: No focal deficit present        Mental Status: He is alert and oriented to person, place, and time  Psychiatric:         Mood and Affect: Mood normal          Behavior: Behavior normal          Thought Content: Thought content normal          Judgment: Judgment normal        Past History  Past Medical History:   Diagnosis Date    Arthritis     Chronic bladder pain 06/05/2017    Chronic pain     Diabetes mellitus ([place] [address] )     Elevated PSA 2016    Frequency of urination     Hematuria, gross 06/15/2017    Hypercholesterolemia     Hyperlipidemia     Kidney stones 11/01/2016    Lymphoma ([place] [address] )     Malignant neoplasm of prostate ([place] [address] ) 04/20/2017    Pain in hip     Skin cancer     Sleep apnea      Social History     Socioeconomic History    Marital status: /Civil Union     Spouse name: None    Number of children: None    Years of education: None    Highest education level: None   Occupational History    Occupation: [employer]  - retired    Tobacco Use    Smoking status: Never Smoker    Smokeless tobacco: Never Used   Vaping Use    Vaping Use: Never used   Substance and Sexual Activity    Alcohol use: No    Drug use: No    Sexual activity: Never   Other Topics Concern    None   Social History Narrative    None     Social Determinants of Health     Financial Resource Strain:     Difficulty of Paying Living Expenses:    Food Insecurity:     Worried About Running Out of Food in the Last Year:     Ran Out of Food in the Last Year:    Transportation Needs:     Lack of Transportation (Medical):      Lack of Transportation (Non-Medical):    Physical Activity:     Days of Exercise per Week:     Minutes of Exercise per Session:    Stress:     Feeling of Stress :    Social Connections:     Frequency of Communication with Friends and Family:     Frequency of Social Gatherings with Friends and Family:     Attends Alevism Services:     Active Member of Clubs or Organizations:     Attends Club or Organization Meetings:     Marital Status:    Intimate Partner Violence:     Fear of Current or Ex-Partner:     Emotionally Abused:     Physically Abused:     Sexually Abused:      Social History     Tobacco Use   Smoking Status Never Smoker   Smokeless Tobacco Never Used     Family History   Problem Relation Age of Onset    Heart disease Mother        The following portions of the patient's history were reviewed and updated as appropriate: allergies, current medications, past medical history, past social history, past surgical history and problem list     Results  Recent Results (from the past 1 hour(s))   POCT Measure PVR    Collection Time: 09/29/21  9:39 AM   Result Value Ref Range    POST-VOID RESIDUAL VOLUME, ML  mL   POCT urine dip auto non-scope    Collection Time: 09/29/21  9:46 AM   Result Value Ref Range     COLOR,UA yellow     CLARITY,UA clear     SPECIFIC GRAVITY,UA 1 025      PH,UA 6 0     LEUKOCYTE ESTERASE,UA -     NITRITE,UA -     GLUCOSE, UA -     KETONES,UA -     BILIRUBIN,UA -     BLOOD,UA -     POCT URINE PROTEIN -     SL AMB POCT UROBILINOGEN 0 2E U /dL    ]  Lab Results   Component Value Date    PSA 3 1 08/20/2018     Lab Results   Component Value Date    CALCIUM 8 4 09/08/2021    K 4 1 09/08/2021    CO2 28 09/08/2021    CL 96 (L) 09/08/2021    BUN 9 09/08/2021    CREATININE 0 85 09/08/2021     Lab Results   Component Value Date    WBC 6 15 09/08/2021    HGB 13 4 09/08/2021    HCT 39 8 09/08/2021    MCV 88 09/08/2021     09/08/2021     Melissa Case PA-C

## 2022-01-01 ENCOUNTER — TELEPHONE (OUTPATIENT)
Dept: UROLOGY | Facility: MEDICAL CENTER | Age: 77
End: 2022-01-01

## 2022-01-01 ENCOUNTER — TELEPHONE (OUTPATIENT)
Dept: OTHER | Facility: OTHER | Age: 77
End: 2022-01-01

## 2022-01-01 ENCOUNTER — APPOINTMENT (OUTPATIENT)
Dept: LAB | Facility: MEDICAL CENTER | Age: 77
End: 2022-01-01
Payer: MEDICARE

## 2022-01-01 ENCOUNTER — OFFICE VISIT (OUTPATIENT)
Dept: UROLOGY | Facility: MEDICAL CENTER | Age: 77
End: 2022-01-01
Payer: MEDICARE

## 2022-01-01 ENCOUNTER — TELEPHONE (OUTPATIENT)
Dept: UROLOGY | Facility: AMBULATORY SURGERY CENTER | Age: 77
End: 2022-01-01

## 2022-01-01 VITALS — BODY MASS INDEX: 31.7 KG/M2 | HEIGHT: 69 IN | WEIGHT: 214 LBS

## 2022-01-01 DIAGNOSIS — C61 PROSTATE CANCER (HCC): ICD-10-CM

## 2022-01-01 DIAGNOSIS — E11.9 CONTROLLED TYPE 2 DIABETES MELLITUS WITHOUT COMPLICATION, WITHOUT LONG-TERM CURRENT USE OF INSULIN (HCC): ICD-10-CM

## 2022-01-01 DIAGNOSIS — R30.0 BURNING WITH URINATION: ICD-10-CM

## 2022-01-01 DIAGNOSIS — R35.0 FREQUENCY OF URINATION: Primary | ICD-10-CM

## 2022-01-01 DIAGNOSIS — N20.0 NEPHROLITHIASIS: ICD-10-CM

## 2022-01-01 DIAGNOSIS — C61 PROSTATE CANCER (HCC): Primary | ICD-10-CM

## 2022-01-01 LAB
POST-VOID RESIDUAL VOLUME, ML POC: 78 ML
PSA SERPL-MCNC: 6.5 NG/ML (ref 0–4)
SL AMB  POCT GLUCOSE, UA: NORMAL
SL AMB LEUKOCYTE ESTERASE,UA: NORMAL
SL AMB POCT BILIRUBIN,UA: NORMAL
SL AMB POCT BLOOD,UA: NORMAL
SL AMB POCT CLARITY,UA: CLEAR
SL AMB POCT COLOR,UA: YELLOW
SL AMB POCT KETONES,UA: NORMAL
SL AMB POCT NITRITE,UA: NORMAL
SL AMB POCT PH,UA: 5.5
SL AMB POCT SPECIFIC GRAVITY,UA: 1.01
SL AMB POCT URINE PROTEIN: NORMAL
SL AMB POCT UROBILINOGEN: 0.2

## 2022-01-01 PROCEDURE — 81003 URINALYSIS AUTO W/O SCOPE: CPT | Performed by: PHYSICIAN ASSISTANT

## 2022-01-01 PROCEDURE — 84153 ASSAY OF PSA TOTAL: CPT

## 2022-01-01 PROCEDURE — 51798 US URINE CAPACITY MEASURE: CPT | Performed by: PHYSICIAN ASSISTANT

## 2022-01-01 PROCEDURE — 99214 OFFICE O/P EST MOD 30 MIN: CPT | Performed by: PHYSICIAN ASSISTANT

## 2022-01-10 NOTE — TELEPHONE ENCOUNTER
Ruslan Loge from [facility] sending order request to fax [phone]   Patient order request

## 2022-01-10 NOTE — TELEPHONE ENCOUNTER
Patient last seen [facility]  Patient called in stating for about two weeks he has been having bladder infection symptoms  Frequent urination especially at night and sometime wet his bed and a little bladder discomfort across the abdomen     Patient can be reached at [phone]

## 2022-01-10 NOTE — TELEPHONE ENCOUNTER
Patient managed by Dio QUINTANILLA at the [facility] office  He has a history of frequency and prostate cancer  Last seen in the office on 9-    Patient called with complaints of increased urgency or urination, frequency and mild discomfort when he urinates  Pt denies hematuria, fever or chills at this time  Pt is requesting urning testing to be completed to rule out UTI  Pt stated that he will be going tomorrow to submit this sample as he is unable to get to lab center today  Orders placed for UA C&S to rule out urinary tract infection  Office will follow up as results become available usually within 48-72 hours  Patient encouraged to hydrate well with water and avoid bladder irritants  Patient instructed to call back with worsening symptoms, fever, chills, blood in the urine or difficulty urinating

## 2022-01-11 NOTE — TELEPHONE ENCOUNTER
Call placed to [facility] and spoke with Kike Pozo  Informed her that we did not receive fax with order request  Provided her with office fax number  She will be re-faxing script over for Dr Emerald Lou to sign  Informed her that Dr Emerald Lou is not in the office until Friday  She will make a notation on the file and contact the office with any further questions she should have

## 2022-01-11 NOTE — TELEPHONE ENCOUNTER
Kulwant from [facility] called stating a form was faxed yesterday   She would like to know if was received    Any questions 4 [phone]

## 2022-01-17 NOTE — TELEPHONE ENCOUNTER
Jacob Torres called in to confirm receipt of paperwork faxed over to the office on 1/11/2022  She is requesting a call back from the office

## 2022-01-26 NOTE — TELEPHONE ENCOUNTER
[facility] from [employer] called in stating she faxed a statement form, needed for Medicare, in for this patient  Would like for someone to send form back or call her with any questions at [phone]

## 2022-01-26 NOTE — TELEPHONE ENCOUNTER
Placed call to pt and left msg on VM to return call to office  When pt calls back he is to be asked what catheter supplies he is using  We have been getting letters from [facility] for medical info  There is nothing in pt's chart noting what he is needing

## 2022-03-02 NOTE — TELEPHONE ENCOUNTER
Patient will need a copy of his lab referrals to go to [facility] for his blood work for the appointment on 03-30-22  Please contact patient

## 2022-03-25 NOTE — TELEPHONE ENCOUNTER
Patient called stating he will be going to [facility] for his labs    psa level in about half hour for his appointment for Tuesday 03/3022

## 2022-03-25 NOTE — TELEPHONE ENCOUNTER
Pt called has not received lab order via mail stated he requested it faxed to [facility] [phone]  Done confirmation received

## 2022-03-30 NOTE — PROGRESS NOTES
3/30/2022      Chief Complaint   Patient presents with    Prostate Cancer         Assessment and Plan    67 y o  male managed by Dr Mandeep Shepherd     1  Prostate cancer  · Bob 6 in 10/12 cores, Bob 7 in 2/12 per previous documentation  · Managed with intermittent ADT  Last Lupron injection in 2017  · PSA: 6 5 (3/25/22)  · Previous PSAs: 6 7 (8/31/21), 5 11 (02/26/2021), 5 2 to (08/24/2020), 5 41 (04/15/2020), 4 85 (12/31/2019), 6 72 (09/2019)  · PSA remains stable at this time  · Follow up in 6 months with repeat PSA  2  Nocturia  · Urine today: negative   · AUA score: 11  · PVR: 78 mL   · Managed on Vesicare 10 mg po daily  · No refills needed  · Annual follow up in 9/22  3  Nephrolithiasis   · 3 mm right nonobstructing renal calculi on CT 9/8/21  · Remains asymptomatic  · Will order diagnostic imaging as patient becomes symptomatic  · Annual follow up 9/22  History of Present Illness  Sheron Kenny is a 67 y o  male here for evaluation of prostate cancer  Patient has history of Bob 6 prostate cancer in 10 of 12 cores and [place] 7 prostate cancer in 2 of 12 cores based off previous documentation  Early he has been managed on intermittent ADT with his last Lupron injection in 2017  He was last evaluated in the office in September of 2021 when his PSA was elevated to 6 7  Discussed with Dr Mandeep Shepherd at the time who did not recommend reinitiating ADT at that time  PSA remains stable 6 months later  He feels well controlled on Vesicare for nocturia  He also notes wearing a pad at night seems to help as well  He denies any flank pain, dysuria, or hematuria  Review of Systems   Constitutional: Negative for chills and fever  HENT: Negative  Respiratory: Negative  Cardiovascular: Negative  Gastrointestinal: Negative for abdominal pain, nausea and vomiting  Genitourinary: Positive for frequency and urgency   Negative for difficulty urinating, dysuria, flank pain, hematuria, scrotal swelling and testicular pain  Nocturia 3x per night  Denies sensation of incomplete bladder emptying  Musculoskeletal: Negative  Skin: Negative  AUA SYMPTOM SCORE      Most Recent Value   AUA SYMPTOM SCORE    How often have you had a sensation of not emptying your bladder completely after you finished urinating? 2   How often have you had to urinate again less than two hours after you finished urinating? 2   How often have you found you stopped and started again several times when you urinate? 1   How often have you found it difficult to postpone urination? 1   How often have you had a weak urinary stream? 1   How often have you had to push or strain to begin urination? 1   How many times did you most typically get up to urinate from the time you went to bed at night until the time you got up in the morning? 3   Quality of Life: If you were to spend the rest of your life with your urinary condition just the way it is now, how would you feel about that? 2   AUA SYMPTOM SCORE 11           Vitals  Vitals:    03/30/22 1324   Weight: 97 1 kg (214 lb)   Height: 5' 9" (1 753 m)       Physical Exam  Vitals reviewed  Constitutional:       General: He is not in acute distress  Appearance: Normal appearance  He is obese  He is not ill-appearing, toxic-appearing or diaphoretic  HENT:      Head: Normocephalic and atraumatic  Eyes:      Conjunctiva/sclera: Conjunctivae normal    Pulmonary:      Effort: Pulmonary effort is normal  No respiratory distress  Abdominal:      General: There is no distension  Palpations: Abdomen is soft  Tenderness: There is no abdominal tenderness  There is no right CVA tenderness, left CVA tenderness, guarding or rebound  Musculoskeletal:         General: Normal range of motion  Cervical back: Normal range of motion  Skin:     General: Skin is warm and dry  Neurological:      General: No focal deficit present        Mental Status: He is alert and oriented to person, place, and time  Psychiatric:         Mood and Affect: Mood normal          Behavior: Behavior normal          Thought Content:  Thought content normal          Judgment: Judgment normal        Past History  Past Medical History:   Diagnosis Date    Arthritis     Chronic bladder pain 06/05/2017    Chronic pain     Diabetes mellitus ([facility] )     Elevated PSA 2016    Frequency of urination     Hematuria, gross 06/15/2017    Hypercholesterolemia     Hyperlipidemia     Kidney stones 11/01/2016    Lymphoma ([facility] )     Malignant neoplasm of prostate ([facility] ) 04/20/2017    Pain in hip     Skin cancer     Sleep apnea      Social History     Socioeconomic History    Marital status: /Civil Union     Spouse name: Not on file    Number of children: Not on file    Years of education: Not on file    Highest education level: Not on file   Occupational History    Occupation: [employer]  - retired    Tobacco Use    Smoking status: Never Smoker    Smokeless tobacco: Never Used   Vaping Use    Vaping Use: Never used   Substance and Sexual Activity    Alcohol use: No    Drug use: No    Sexual activity: Never   Other Topics Concern    Not on file   Social History Narrative    Not on file     Social Determinants of Health     Financial Resource Strain: Not on file   Food Insecurity: Not on file   Transportation Needs: Not on file   Physical Activity: Not on file   Stress: Not on file   Social Connections: Not on file   Intimate Partner Violence: Not on file   Housing Stability: Not on file     Social History     Tobacco Use   Smoking Status Never Smoker   Smokeless Tobacco Never Used     Family History   Problem Relation Age of Onset    Heart disease Mother        The following portions of the patient's history were reviewed and updated as appropriate: allergies, current medications, past medical history, past social history, past surgical history and problem list     Results  No results found for this or any previous visit (from the past 1 hour(s)) ]  Lab Results   Component Value Date    PSA 6 5 (H) 03/25/2022    PSA 3 1 08/20/2018     Lab Results   Component Value Date    CALCIUM 8 4 09/08/2021    K 4 1 09/08/2021    CO2 28 09/08/2021    CL 96 (L) 09/08/2021    BUN 9 09/08/2021    CREATININE 0 85 09/08/2021     Lab Results   Component Value Date    WBC 6 15 09/08/2021    HGB 13 4 09/08/2021    HCT 39 8 09/08/2021    MCV 88 09/08/2021     09/08/2021     Donn Vigil PA-C

## 2024-12-09 NOTE — TELEPHONE ENCOUNTER
Could we please call patient and let him know that I reviewed his PSA results with Dr Agustina Valladares who does not feel he requires Lupron injection at this time  Good to keep follow up with PSA in 6 months       Thank you!! .

## (undated) DEVICE — TUBING SUCTION 5MM X 12 FT

## (undated) DEVICE — SPECIMEN CONTAINER STERILE PEEL PACK

## (undated) DEVICE — GLOVE SRG BIOGEL 6.5

## (undated) DEVICE — GLOVE SRG BIOGEL 7

## (undated) DEVICE — UROCATCH BAG

## (undated) DEVICE — PACK TUR

## (undated) DEVICE — GLOVE INDICATOR PI UNDERGLOVE SZ 7 BLUE

## (undated) DEVICE — 3000CC GUARDIAN II: Brand: GUARDIAN

## (undated) DEVICE — GUIDEWIRE STRGHT TIP 0.035 IN  SOLO PLUS

## (undated) DEVICE — SCD SEQUENTIAL COMPRESSION COMFORT SLEEVE MEDIUM KNEE LENGTH: Brand: KENDALL SCD

## (undated) DEVICE — CATH URETERAL 5FR X 70 CM FLEX TIP POLYUR BARD